# Patient Record
Sex: MALE | Race: WHITE | NOT HISPANIC OR LATINO | URBAN - METROPOLITAN AREA
[De-identification: names, ages, dates, MRNs, and addresses within clinical notes are randomized per-mention and may not be internally consistent; named-entity substitution may affect disease eponyms.]

---

## 2017-02-07 ENCOUNTER — EMERGENCY (EMERGENCY)
Facility: HOSPITAL | Age: 32
LOS: 1 days | Discharge: HOME | End: 2017-02-07
Attending: EMERGENCY MEDICINE
Payer: SELF-PAY

## 2017-02-07 VITALS
DIASTOLIC BLOOD PRESSURE: 78 MMHG | OXYGEN SATURATION: 96 % | HEIGHT: 69 IN | TEMPERATURE: 98 F | SYSTOLIC BLOOD PRESSURE: 126 MMHG | RESPIRATION RATE: 18 BRPM | WEIGHT: 154.98 LBS | HEART RATE: 98 BPM

## 2017-02-07 DIAGNOSIS — F14.10 COCAINE ABUSE, UNCOMPLICATED: ICD-10-CM

## 2017-02-07 DIAGNOSIS — F10.239 ALCOHOL DEPENDENCE WITH WITHDRAWAL, UNSPECIFIED: ICD-10-CM

## 2017-02-07 DIAGNOSIS — R25.1 TREMOR, UNSPECIFIED: ICD-10-CM

## 2017-02-07 LAB
ALBUMIN SERPL ELPH-MCNC: 4.4 G/DL — SIGNIFICANT CHANGE UP (ref 3.3–5.2)
ALP SERPL-CCNC: 113 U/L — SIGNIFICANT CHANGE UP (ref 40–120)
ALT FLD-CCNC: 124 U/L — HIGH
ANION GAP SERPL CALC-SCNC: 16 MMOL/L — SIGNIFICANT CHANGE UP (ref 5–17)
APAP SERPL-MCNC: <9.3 UG/ML — LOW (ref 10–26)
APPEARANCE UR: CLEAR — SIGNIFICANT CHANGE UP
AST SERPL-CCNC: 188 U/L — HIGH
BASOPHILS # BLD AUTO: 0 K/UL — SIGNIFICANT CHANGE UP (ref 0–0.2)
BASOPHILS NFR BLD AUTO: 0.9 % — SIGNIFICANT CHANGE UP (ref 0–2)
BILIRUB SERPL-MCNC: 0.8 MG/DL — SIGNIFICANT CHANGE UP (ref 0.4–2)
BILIRUB UR-MCNC: NEGATIVE — SIGNIFICANT CHANGE UP
BUN SERPL-MCNC: 7 MG/DL — LOW (ref 8–20)
CALCIUM SERPL-MCNC: 9.5 MG/DL — SIGNIFICANT CHANGE UP (ref 8.6–10.2)
CHLORIDE SERPL-SCNC: 92 MMOL/L — LOW (ref 98–107)
CK MB CFR SERPL CALC: 14.4 NG/ML — HIGH (ref 0–6.7)
CK SERPL-CCNC: 1281 U/L — HIGH (ref 30–200)
CO2 SERPL-SCNC: 25 MMOL/L — SIGNIFICANT CHANGE UP (ref 22–29)
COLOR SPEC: SIGNIFICANT CHANGE UP
CREAT SERPL-MCNC: 0.54 MG/DL — SIGNIFICANT CHANGE UP (ref 0.5–1.3)
DIFF PNL FLD: ABNORMAL
EOSINOPHIL # BLD AUTO: 0.1 K/UL — SIGNIFICANT CHANGE UP (ref 0–0.5)
EOSINOPHIL NFR BLD AUTO: 2.6 % — SIGNIFICANT CHANGE UP (ref 0–5)
GLUCOSE SERPL-MCNC: 130 MG/DL — HIGH (ref 70–115)
GLUCOSE UR QL: NEGATIVE MG/DL — SIGNIFICANT CHANGE UP
HCT VFR BLD CALC: 43 % — SIGNIFICANT CHANGE UP (ref 42–52)
HGB BLD-MCNC: 15.3 G/DL — SIGNIFICANT CHANGE UP (ref 14–18)
KETONES UR-MCNC: NEGATIVE — SIGNIFICANT CHANGE UP
LEUKOCYTE ESTERASE UR-ACNC: NEGATIVE — SIGNIFICANT CHANGE UP
LYMPHOCYTES # BLD AUTO: 1.1 K/UL — SIGNIFICANT CHANGE UP (ref 1–4.8)
LYMPHOCYTES # BLD AUTO: 21.1 % — SIGNIFICANT CHANGE UP (ref 20–55)
MAGNESIUM SERPL-MCNC: 1.4 MG/DL — LOW (ref 1.8–2.5)
MCHC RBC-ENTMCNC: 35.4 PG — HIGH (ref 27–31)
MCHC RBC-ENTMCNC: 35.6 G/DL — SIGNIFICANT CHANGE UP (ref 32–36)
MCV RBC AUTO: 99.5 FL — HIGH (ref 80–94)
MONOCYTES # BLD AUTO: 0.8 K/UL — SIGNIFICANT CHANGE UP (ref 0–0.8)
MONOCYTES NFR BLD AUTO: 16 % — HIGH (ref 3–10)
NEUTROPHILS # BLD AUTO: 3.2 K/UL — SIGNIFICANT CHANGE UP (ref 1.8–8)
NEUTROPHILS NFR BLD AUTO: 59.2 % — SIGNIFICANT CHANGE UP (ref 37–73)
NITRITE UR-MCNC: NEGATIVE — SIGNIFICANT CHANGE UP
PH UR: 8 — SIGNIFICANT CHANGE UP (ref 4.8–8)
PHOSPHATE SERPL-MCNC: 2.4 MG/DL — SIGNIFICANT CHANGE UP (ref 2.4–4.7)
PLATELET # BLD AUTO: 101 K/UL — LOW (ref 150–400)
POTASSIUM SERPL-MCNC: 3.7 MMOL/L — SIGNIFICANT CHANGE UP (ref 3.5–5.3)
POTASSIUM SERPL-SCNC: 3.7 MMOL/L — SIGNIFICANT CHANGE UP (ref 3.5–5.3)
PROT SERPL-MCNC: 7.7 G/DL — SIGNIFICANT CHANGE UP (ref 6.6–8.7)
PROT UR-MCNC: 15 MG/DL
RBC # BLD: 4.32 M/UL — LOW (ref 4.6–6.2)
RBC # FLD: 13.7 % — SIGNIFICANT CHANGE UP (ref 11–15.6)
RBC CASTS # UR COMP ASSIST: SIGNIFICANT CHANGE UP /HPF (ref 0–4)
SALICYLATES SERPL-MCNC: <2 MG/DL — LOW (ref 10–20)
SODIUM SERPL-SCNC: 133 MMOL/L — LOW (ref 135–145)
SP GR SPEC: 1.01 — SIGNIFICANT CHANGE UP (ref 1.01–1.02)
TROPONIN T SERPL-MCNC: <0.01 NG/ML — SIGNIFICANT CHANGE UP (ref 0–0.06)
TSH SERPL-MCNC: 2.82 UIU/ML — SIGNIFICANT CHANGE UP (ref 0.27–4.2)
UROBILINOGEN FLD QL: NEGATIVE MG/DL — SIGNIFICANT CHANGE UP
WBC # BLD: 5.32 K/UL — SIGNIFICANT CHANGE UP (ref 4.8–10.8)
WBC # FLD AUTO: 5.32 K/UL — SIGNIFICANT CHANGE UP (ref 4.8–10.8)

## 2017-02-07 PROCEDURE — 99285 EMERGENCY DEPT VISIT HI MDM: CPT

## 2017-02-07 PROCEDURE — 70450 CT HEAD/BRAIN W/O DYE: CPT | Mod: 26

## 2017-02-07 PROCEDURE — 71010: CPT | Mod: 26

## 2017-02-07 PROCEDURE — 93010 ELECTROCARDIOGRAM REPORT: CPT

## 2017-02-07 RX ORDER — SODIUM CHLORIDE 9 MG/ML
1000 INJECTION INTRAMUSCULAR; INTRAVENOUS; SUBCUTANEOUS ONCE
Qty: 0 | Refills: 0 | Status: COMPLETED | OUTPATIENT
Start: 2017-02-07 | End: 2017-02-07

## 2017-02-07 RX ORDER — MAGNESIUM SULFATE 500 MG/ML
2 VIAL (ML) INJECTION ONCE
Qty: 0 | Refills: 0 | Status: COMPLETED | OUTPATIENT
Start: 2017-02-07 | End: 2017-02-07

## 2017-02-07 RX ADMIN — SODIUM CHLORIDE 3000 MILLILITER(S): 9 INJECTION INTRAMUSCULAR; INTRAVENOUS; SUBCUTANEOUS at 17:57

## 2017-02-07 RX ADMIN — Medication 50 GRAM(S): at 19:11

## 2017-02-07 RX ADMIN — Medication 50 MILLIGRAM(S): at 22:24

## 2017-02-07 RX ADMIN — SODIUM CHLORIDE 3000 MILLILITER(S): 9 INJECTION INTRAMUSCULAR; INTRAVENOUS; SUBCUTANEOUS at 19:11

## 2017-02-07 NOTE — ED PROVIDER NOTE - OBJECTIVE STATEMENT
pt presents with tremors states " I took a bunch cocaine and ana last night" denies suicidal or homcidal ideation. no visual or auditory hallucinations. drink pint of day of vodka friend called for possible seizure . denies fever. denies HA or neck pain. no chest pain or sob. no abd pain. no n/v/d. no urinary f/u/d. no back pain. no motor or sensory deficits.  no rash. no other acute issues symptoms or concerns

## 2017-02-07 NOTE — ED ADULT NURSE NOTE - UNABLE TO OBTAIN
Unresponsive patient medicated with Valium 10mg IVP. received patient sleeping, arouses to tactile stimuli but unable to answer questions/

## 2017-02-07 NOTE — ED ADULT NURSE NOTE - OBJECTIVE STATEMENT
As per MD Jalloh patient had dilated pupils and appeared to be under the influence of unknown drugs and began to have a seizure. Airway remained patient, IV access obtained and medicated. No acute respiratory distress noted. Patient received in yellow gown and belongings secured during triage.

## 2017-02-07 NOTE — ED PROVIDER NOTE - PROGRESS NOTE DETAILS
pt is feeling much better and pt is asymptomatic and pt needed Librium for etoh withdrawl. pt with rhabdo. pt hydrated. no evidence of ARF. pt back to baseline mental status. pt will need SW as he has nowhere to go

## 2017-02-07 NOTE — ED PROVIDER NOTE - CARE PLAN
Principal Discharge DX:	Alcohol withdrawal  Secondary Diagnosis:	Tremor  Secondary Diagnosis:	Cocaine abuse

## 2017-02-07 NOTE — ED PROVIDER NOTE - MEDICAL DECISION MAKING DETAILS
will treat hypersympathetic state hydrate reevalv when clincally sober for need admission will hydrate fro rhaddo night attending to reassess will treat hypersympathetic state hydrate reevalv when clincally sober for need admission will hydrate fro rhaddo night attending to reassess  pt feeling much better no tremors he is competent return to ed for intractrable HA persitent vomiting or new onset motor or senory deficitys. he is aking to go home with friend he was provided librium at pharmacy pt agrees to plano f care seen by SW he is not suicidal or homicidal

## 2017-02-07 NOTE — ED ADULT TRIAGE NOTE - CHIEF COMPLAINT QUOTE
pt bib ems s/p seizure. pt states last drink was yesterday. pt usually drinks 3 pints of vodka. he also did 1 bag of cocaine. pt alert and oriented at this time.

## 2017-02-08 VITALS
SYSTOLIC BLOOD PRESSURE: 140 MMHG | HEART RATE: 82 BPM | RESPIRATION RATE: 16 BRPM | OXYGEN SATURATION: 99 % | DIASTOLIC BLOOD PRESSURE: 86 MMHG

## 2017-02-08 PROCEDURE — 84484 ASSAY OF TROPONIN QUANT: CPT

## 2017-02-08 PROCEDURE — 83735 ASSAY OF MAGNESIUM: CPT

## 2017-02-08 PROCEDURE — 70450 CT HEAD/BRAIN W/O DYE: CPT

## 2017-02-08 PROCEDURE — 82553 CREATINE MB FRACTION: CPT

## 2017-02-08 PROCEDURE — 80053 COMPREHEN METABOLIC PANEL: CPT

## 2017-02-08 PROCEDURE — 84100 ASSAY OF PHOSPHORUS: CPT

## 2017-02-08 PROCEDURE — 85027 COMPLETE CBC AUTOMATED: CPT

## 2017-02-08 PROCEDURE — 81001 URINALYSIS AUTO W/SCOPE: CPT

## 2017-02-08 PROCEDURE — 83690 ASSAY OF LIPASE: CPT

## 2017-02-08 PROCEDURE — 80307 DRUG TEST PRSMV CHEM ANLYZR: CPT

## 2017-02-08 PROCEDURE — 96375 TX/PRO/DX INJ NEW DRUG ADDON: CPT

## 2017-02-08 PROCEDURE — 71045 X-RAY EXAM CHEST 1 VIEW: CPT

## 2017-02-08 PROCEDURE — 84443 ASSAY THYROID STIM HORMONE: CPT

## 2017-02-08 PROCEDURE — 96374 THER/PROPH/DIAG INJ IV PUSH: CPT

## 2017-02-08 PROCEDURE — 82550 ASSAY OF CK (CPK): CPT

## 2017-02-08 PROCEDURE — 93005 ELECTROCARDIOGRAM TRACING: CPT

## 2017-02-08 PROCEDURE — 99284 EMERGENCY DEPT VISIT MOD MDM: CPT | Mod: 25

## 2017-02-08 RX ADMIN — Medication 2 MILLIGRAM(S): at 02:17

## 2017-02-08 RX ADMIN — Medication 50 MILLIGRAM(S): at 11:00

## 2017-02-08 NOTE — SBIRT NOTE. - NSSBIRTSERVICES_GEN_A_ED_FT
Provided SBIRT services: Full screen positive. Referral to Treatment Performed. Screening results were reviewed with the patient and patient was provided information about healthy guidelines and potential negative consequences associated with level of risk. Motivation and readiness to reduce or stop use was discussed and goals and activities to make changes were suggested/offered.  Referral for complete assessment and level of care determination at a certified treatment facility was completed by giving the patient information for treatment facilities that met their needs and encouraging them to call for an appointment. A call was not made to a facility because  Patient not interested at this time  Audit Score:38  DAST Score:5  Duration = 20 Minutes

## 2017-02-08 NOTE — ED BEHAVIORAL HEALTH NOTE - BEHAVIORAL HEALTH NOTE
SW asked to see pt in ED for d/c planning needs and h/o substance abuse. Chart reviewed. Met with pt @ bedside. pt AxOx4. Stated that he was brought in by ambulance overnight. Pt's friend, Todd, called as pt was under the influence and s/p seizure. Pt reported long history of substance abuse. Stated that he used cocaine, speed, and etoh last night. Pt has a hx of 2 felonies (one drug related and one for stolen car). Pt no longer on parole (was dismissed as of 2015). Pt is currently homeless. Stated that he resides in [place] at friend's house, but came out to [facility] to see his friend Todd and has been staying with him in [facility] (unsure of address). Pt is not interested in substance abuse treatment (inpatient or outpatient). Pt denied psychiatric hx. Denied suicidal or homocidal ideation. Pt has no PMD and no health insurance at this time. Pt currently unemployed as he states "it's hard finding a job with 2 felonies". Pt is single with no children. Pt will attempt to get in touch with his friend Todd for ride home today. Declined DSS emergency housing for shelter needs.

## 2017-02-08 NOTE — ED ADULT NURSE REASSESSMENT NOTE - NAUSEA/VOMITING
1=mild nausea with no vomiting
0=no nausea with no vomiting

## 2017-02-08 NOTE — ED ADULT NURSE REASSESSMENT NOTE - TREMOR
4=moderate with patient's arms extended
0=no tremor
0=no tremor
4=moderate with patient's arms extended

## 2017-02-08 NOTE — ED ADULT NURSE REASSESSMENT NOTE - NS ED NURSE REASSESS COMMENT FT1
Pt received at this time from off-going CJ Odonnell. Pt is expressing that he want's to leave and states he is feeling better. Pt with patent 20G RAC with 0.9%NS bolus infusing. 1:1 at bedside. Pt sp02 is currently 98% on RA. pt is a&ox4.
pt now c/o "severe headache".  noticeable tremors.  pt stating he lives in [facility] and has no way to get home.  dr batista made aware.  ativan ordered.
pt resting comfortably in cart awaiting ct results.  pt is a+ox4.  as per higinio montelongo to d/c constant observation.
pt sleeping and in nad.  1:1 at bedside.  resp even and unlabored.  vss.  will continue to monitor.
pt c/o feeling "weak".  Dr Hall made aware of weakness and pt's CIWA score.  librium ordered.
pt sleeping in stretcher with 1:1 at bedside. easily arousable to verbal stimuli.  sx improved after librium administration.  awaiting dispo.  will continue to monitor.
Pt received Alert and Oriented to person, place, and time, pt is easily aroused and has tremors with arms extended. HR is regular,lungs clear b/l abd soft with positve bowel sounds in all four quadrants will cont to monitor.

## 2020-07-15 ENCOUNTER — INPATIENT (INPATIENT)
Facility: HOSPITAL | Age: 35
LOS: 6 days | Discharge: PSYCHIATRIC OR SUBSTANCE USE FACILITY | End: 2020-07-22
Attending: HOSPITALIST | Admitting: HOSPITALIST
Payer: MEDICAID

## 2020-07-15 VITALS
SYSTOLIC BLOOD PRESSURE: 137 MMHG | RESPIRATION RATE: 16 BRPM | DIASTOLIC BLOOD PRESSURE: 94 MMHG | HEART RATE: 84 BPM | OXYGEN SATURATION: 99 % | TEMPERATURE: 98 F

## 2020-07-15 DIAGNOSIS — R07.89 OTHER CHEST PAIN: ICD-10-CM

## 2020-07-15 DIAGNOSIS — Z02.9 ENCOUNTER FOR ADMINISTRATIVE EXAMINATIONS, UNSPECIFIED: ICD-10-CM

## 2020-07-15 DIAGNOSIS — Z29.9 ENCOUNTER FOR PROPHYLACTIC MEASURES, UNSPECIFIED: ICD-10-CM

## 2020-07-15 DIAGNOSIS — R45.851 SUICIDAL IDEATIONS: ICD-10-CM

## 2020-07-15 DIAGNOSIS — F10.230 ALCOHOL DEPENDENCE WITH WITHDRAWAL, UNCOMPLICATED: ICD-10-CM

## 2020-07-15 DIAGNOSIS — S22.39XA FRACTURE OF ONE RIB, UNSPECIFIED SIDE, INITIAL ENCOUNTER FOR CLOSED FRACTURE: ICD-10-CM

## 2020-07-15 LAB
ALBUMIN SERPL ELPH-MCNC: 4.4 G/DL — SIGNIFICANT CHANGE UP (ref 3.3–5)
ALP SERPL-CCNC: 82 U/L — SIGNIFICANT CHANGE UP (ref 40–120)
ALT FLD-CCNC: 27 U/L — SIGNIFICANT CHANGE UP (ref 4–41)
AMPHET UR-MCNC: NEGATIVE — SIGNIFICANT CHANGE UP
ANION GAP SERPL CALC-SCNC: 15 MMO/L — HIGH (ref 7–14)
APAP SERPL-MCNC: < 15 UG/ML — LOW (ref 15–25)
APPEARANCE UR: SIGNIFICANT CHANGE UP
AST SERPL-CCNC: 35 U/L — SIGNIFICANT CHANGE UP (ref 4–40)
BACTERIA # UR AUTO: HIGH
BARBITURATES UR SCN-MCNC: NEGATIVE — SIGNIFICANT CHANGE UP
BENZODIAZ UR-MCNC: NEGATIVE — SIGNIFICANT CHANGE UP
BILIRUB SERPL-MCNC: 0.3 MG/DL — SIGNIFICANT CHANGE UP (ref 0.2–1.2)
BILIRUB UR-MCNC: NEGATIVE — SIGNIFICANT CHANGE UP
BLOOD UR QL VISUAL: NEGATIVE — SIGNIFICANT CHANGE UP
BUN SERPL-MCNC: 8 MG/DL — SIGNIFICANT CHANGE UP (ref 7–23)
CALCIUM SERPL-MCNC: 9 MG/DL — SIGNIFICANT CHANGE UP (ref 8.4–10.5)
CANNABINOIDS UR-MCNC: NEGATIVE — SIGNIFICANT CHANGE UP
CHLORIDE SERPL-SCNC: 101 MMOL/L — SIGNIFICANT CHANGE UP (ref 98–107)
CO2 SERPL-SCNC: 24 MMOL/L — SIGNIFICANT CHANGE UP (ref 22–31)
COCAINE METAB.OTHER UR-MCNC: POSITIVE — SIGNIFICANT CHANGE UP
COLOR SPEC: SIGNIFICANT CHANGE UP
CREAT SERPL-MCNC: 0.64 MG/DL — SIGNIFICANT CHANGE UP (ref 0.5–1.3)
ETHANOL BLD-MCNC: 60 MG/DL — HIGH
GLUCOSE SERPL-MCNC: 99 MG/DL — SIGNIFICANT CHANGE UP (ref 70–99)
GLUCOSE UR-MCNC: NEGATIVE — SIGNIFICANT CHANGE UP
HCT VFR BLD CALC: 47.6 % — SIGNIFICANT CHANGE UP (ref 39–50)
HGB BLD-MCNC: 16 G/DL — SIGNIFICANT CHANGE UP (ref 13–17)
HIV COMBO RESULT: SIGNIFICANT CHANGE UP
HIV1+2 AB SPEC QL: SIGNIFICANT CHANGE UP
HYALINE CASTS # UR AUTO: NEGATIVE — SIGNIFICANT CHANGE UP
KETONES UR-MCNC: NEGATIVE — SIGNIFICANT CHANGE UP
LEUKOCYTE ESTERASE UR-ACNC: NEGATIVE — SIGNIFICANT CHANGE UP
MCHC RBC-ENTMCNC: 33.3 PG — SIGNIFICANT CHANGE UP (ref 27–34)
MCHC RBC-ENTMCNC: 33.6 % — SIGNIFICANT CHANGE UP (ref 32–36)
MCV RBC AUTO: 99.2 FL — SIGNIFICANT CHANGE UP (ref 80–100)
METHADONE UR-MCNC: NEGATIVE — SIGNIFICANT CHANGE UP
NITRITE UR-MCNC: NEGATIVE — SIGNIFICANT CHANGE UP
NRBC # FLD: 0 K/UL — SIGNIFICANT CHANGE UP (ref 0–0)
OPIATES UR-MCNC: NEGATIVE — SIGNIFICANT CHANGE UP
OXYCODONE UR-MCNC: NEGATIVE — SIGNIFICANT CHANGE UP
PCP UR-MCNC: NEGATIVE — SIGNIFICANT CHANGE UP
PH UR: 8 — SIGNIFICANT CHANGE UP (ref 5–8)
PLATELET # BLD AUTO: 173 K/UL — SIGNIFICANT CHANGE UP (ref 150–400)
PMV BLD: 8.4 FL — SIGNIFICANT CHANGE UP (ref 7–13)
POTASSIUM SERPL-MCNC: 4.2 MMOL/L — SIGNIFICANT CHANGE UP (ref 3.5–5.3)
POTASSIUM SERPL-SCNC: 4.2 MMOL/L — SIGNIFICANT CHANGE UP (ref 3.5–5.3)
PROT SERPL-MCNC: 6.8 G/DL — SIGNIFICANT CHANGE UP (ref 6–8.3)
PROT UR-MCNC: 10 — SIGNIFICANT CHANGE UP
RBC # BLD: 4.8 M/UL — SIGNIFICANT CHANGE UP (ref 4.2–5.8)
RBC # FLD: 13.9 % — SIGNIFICANT CHANGE UP (ref 10.3–14.5)
RBC CASTS # UR COMP ASSIST: SIGNIFICANT CHANGE UP (ref 0–?)
SALICYLATES SERPL-MCNC: < 5 MG/DL — LOW (ref 15–30)
SODIUM SERPL-SCNC: 141 MMOL/L — SIGNIFICANT CHANGE UP (ref 135–145)
SP GR SPEC: 1.01 — SIGNIFICANT CHANGE UP (ref 1–1.04)
SQUAMOUS # UR AUTO: SIGNIFICANT CHANGE UP
TROPONIN T, HIGH SENSITIVITY: < 6 NG/L — SIGNIFICANT CHANGE UP (ref ?–14)
TSH SERPL-MCNC: 0.91 UIU/ML — SIGNIFICANT CHANGE UP (ref 0.27–4.2)
UROBILINOGEN FLD QL: NORMAL — SIGNIFICANT CHANGE UP
WBC # BLD: 5.3 K/UL — SIGNIFICANT CHANGE UP (ref 3.8–10.5)
WBC # FLD AUTO: 5.3 K/UL — SIGNIFICANT CHANGE UP (ref 3.8–10.5)
WBC UR QL: SIGNIFICANT CHANGE UP (ref 0–?)

## 2020-07-15 PROCEDURE — 71100 X-RAY EXAM RIBS UNI 2 VIEWS: CPT | Mod: 26,LT

## 2020-07-15 PROCEDURE — 71046 X-RAY EXAM CHEST 2 VIEWS: CPT | Mod: 26

## 2020-07-15 PROCEDURE — 99223 1ST HOSP IP/OBS HIGH 75: CPT | Mod: GC

## 2020-07-15 RX ORDER — FOLIC ACID 0.8 MG
1 TABLET ORAL DAILY
Refills: 0 | Status: DISCONTINUED | OUTPATIENT
Start: 2020-07-15 | End: 2020-07-22

## 2020-07-15 RX ORDER — ACETAMINOPHEN 500 MG
650 TABLET ORAL EVERY 6 HOURS
Refills: 0 | Status: DISCONTINUED | OUTPATIENT
Start: 2020-07-15 | End: 2020-07-22

## 2020-07-15 RX ORDER — ENOXAPARIN SODIUM 100 MG/ML
40 INJECTION SUBCUTANEOUS DAILY
Refills: 0 | Status: DISCONTINUED | OUTPATIENT
Start: 2020-07-15 | End: 2020-07-22

## 2020-07-15 RX ORDER — THIAMINE MONONITRATE (VIT B1) 100 MG
100 TABLET ORAL DAILY
Refills: 0 | Status: COMPLETED | OUTPATIENT
Start: 2020-07-15 | End: 2020-07-18

## 2020-07-15 RX ADMIN — Medication 25 MILLIGRAM(S): at 15:59

## 2020-07-15 RX ADMIN — Medication 50 MILLIGRAM(S): at 19:00

## 2020-07-15 NOTE — SBIRT NOTE ADULT - NSSBIRTALCPASSREFTXDET_GEN_A_CORE
Writer provided [facility]  - [place] with pt's room number in order to facilitate linkage to 120 day care coordination program: [facility] - p: [phone]. Writer requested to be made notified of pt's assigned coordinator.

## 2020-07-15 NOTE — SBIRT NOTE ADULT - NSSBIRTBRIEFINTDET_GEN_A_CORE
Pt receptive to engagement in consult and agreeable to reviewing healthy drinking guidelines. This writer provided pt with ARS/DAEHRS Addiction Services at [facility]: [address], [address], [address] [place] [address] p: [phone], [facility] Crisis walk in clinic p:[phone], [facility] Treatment/Care Services for Substance Use List. This writer additionally reviewed "Rethink Drinking" booklet from [employer] - [place] Department of Health and Human Services.  Pt endorses nicotine use (1 pack daily). Pt declined need for and [facility] For Tobacco Control Information: [address] [place] [address] , [place] [address] p: [phone] resource.

## 2020-07-15 NOTE — H&P ADULT - NSHPLABSRESULTS_GEN_ALL_CORE
16.0   5.30  )-----------( 173      ( 15 Jul 2020 12:30 )             47.6     Hemoglobin: 16.0 g/dL (07-15 @ 12:30)  Hemoglobin: 16.0 g/dL (07-15 @ 12:30)    CBC Full  -  ( 15 Jul 2020 12:30 )  WBC Count : 5.30 K/uL  RBC Count : 4.80 M/uL  Hemoglobin : 16.0 g/dL  Hematocrit : 47.6 %  Platelet Count - Automated : 173 K/uL  Mean Cell Volume : 99.2 fL  Mean Cell Hemoglobin : 33.3 pg  Mean Cell Hemoglobin Concentration : 33.6 %  Auto Neutrophil # : 3.49 K/uL  Auto Lymphocyte # : 1.06 K/uL  Auto Monocyte # : 0.63 K/uL  Auto Eosinophil # : 0.08 K/uL  Auto Basophil # : 0.05 K/uL  Auto Neutrophil % : 65.3 %  Auto Lymphocyte % : 19.9 %  Auto Monocyte % : 11.8 %  Auto Eosinophil % : 1.5 %  Auto Basophil % : 0.9 %    07-15    141  |  101  |  8   ----------------------------<  99  4.2   |  24  |  0.64    Ca    9.0      15 Jul 2020 12:30    TPro  6.8  /  Alb  4.4  /  TBili  0.3  /  DBili  x   /  AST  35  /  ALT  27  /  AlkPhos  82  15    Creatinine Trend: 0.64<--  LIVER FUNCTIONS - ( 15 Jul 2020 12:30 )  Alb: 4.4 g/dL / Pro: 6.8 g/dL / ALK PHOS: 82 u/L / ALT: 27 u/L / AST: 35 u/L / GGT: x           hs Troponin:  Troponin T, High Sensitivity (07.15.20 @ 18:49)    Troponin T, High Sensitivity: < 6: ---------------------***PLEASE NOTE***----------------------  Rapid changes upward or downward in high-sensitivity  troponin levels strongly suggest acute myocardial injury.  Hemolysis may falsely lower results. Renal impairment may  increase results.    Normal: <6 - 14 ng/L  Indeterminate: 15 - 51 ng/L  Elevated: >51 ng/L      Urinalysis Basic - ( 15 Jul 2020 14:00 )    Color: LIGHT ORANGE / Appearance: TURBID / S.014 / pH: 8.0  Gluc: NEGATIVE / Ketone: NEGATIVE  / Bili: NEGATIVE / Urobili: NORMAL   Blood: NEGATIVE / Protein: 10 / Nitrite: NEGATIVE   Leuk Esterase: NEGATIVE / RBC: 0-2 / WBC 0-2   Sq Epi: OCC / Non Sq Epi: x / Bacteria: MANY      EKG: In ED, HR 77, NSR with SA, , , no ST elev/dep, no TWI, QTc 452    IMAGING:    < from: Xray Chest 2 Views PA/Lat (07.15.20 @ 13:56) >    IMPRESSION:  Acute to subacute appearing anterior left 6th rib fracture near the costochondral junction, correlatefor point tenderness over this region. No additional suspected acute appearing rib fractures or other gross rib pathology.    Old distal right clavicular posttraumatic deformity with irregular discontinuous heterotopic ossifications in the right coracoclavicular space indicative of prior ligamentous injury.    Clear lungs. No pleural effusions or pneumothorax.    Cardiac and mediastinal silhouettes within normal limits.    Trachea midline.    Intact visualized osseous structures. In particular, no acute appearing displaced rib fractures or other gross rib pathology.    < end of copied text >

## 2020-07-15 NOTE — SBIRT NOTE ADULT - NSSBIRTDRGPASSREFTXDET_GEN_A_CORE
Writer provided [facility]  - [place] with pt's room number in order to facilitate linkage to 120 day care coordination program: [facility] - p: [phone]. Writer requested to be made notified of pt's assigned coordinator.

## 2020-07-15 NOTE — ED PROVIDER NOTE - OBJECTIVE STATEMENT
35yo M p/w suicidal ideation without plan. Patient further states he has been drinking 3 6-packs of beer daily for 3 months, and using cocaine daily. Denies any previous history of SI/HI or substance use/abuse. Has never attended detox previously. On ROS patient endorses focal, left sided chest wall pain x3 weeks with no trauma at onset. Denies fever/chills, chest pain, abdominal pain, shortness of breath, n/v/d, tremors, focal deficits, paresthesias, changes in vision, urinary or further symptoms.

## 2020-07-15 NOTE — H&P ADULT - HISTORY OF PRESENT ILLNESS
34 y.o. M w/ no PMHx, hx of alcohol and cocaine use disorder, p/w tremor, diaphoresis, and SI. Pt states he was released from jail on April 8th for second degree assault and has not been able to find a job. In addition, d/t to pandemic, pt fell back to drinking 1.5 months ago. Pt reports drinking one case of beer and one pint of vodka per day, last drink was one beer at 6AM today. Pt reports being hospitalized in ICU for drinking x1 in past, no intubations, black outs, but countless falls. 34 y.o. M w/ no PMHx, hx of alcohol and cocaine use disorder, p/w tremor, diaphoresis, and SI. Pt states he was released from skilled nursing on April 8th for second degree assault and has not been able to find a job. In addition, d/t to pandemic, pt reverted back to drinking 1.5 months ago. Pt reports drinking one case of beer and one pint of vodka per day, last drink was one beer at 6AM today. Pt reports being hospitalized in ICU for drinking x1 in past, no intubations, black outs, but countless falls. Pt also states he sniffs 1 g cocaine daily since 17 years of age, which helps keep him up. At this time, pt reports diaphoresis, nausea, and tremor. Pt also endorses SI in past 1-2 weeks, with recent attempt to jump off of a roof. Pt reports his friend stopped him. At this time, pt denying SI/I/P. Pt denying HA, fever, vomiting, CP, and SOB. Pt reports 30 lb weight loss in past 1.5 months. Per pt, visited [facility] crisis clinic before coming to ED looking for detox and was sent to ED since he appeared to be withdrawing.    In ED, CBC and CMP WNL, UA neg, U tox + for cocaine, BAL 60 @ 12:30 PM. CXR shows left acute to subacute 6th rib fx near costochondral junction. EKG NSR, placed on 1:1 and given librium 25 x1.

## 2020-07-15 NOTE — ED PROVIDER NOTE - PROGRESS NOTE DETAILS
Wilfrido CATALAN: Discussed with psych/SW. Patient went to [facility] crisis center looking for detox and was sent in because he appeared shaky and as if he was withdrawing. Last ETOH use was 6 AM. Patient reports history of withdrawal in the past. Psych states patient needs to not be intoxicated for psych evaluation. I spoke to Anya of SBIRT ([id number]) team and she states that patient needs psych eval if he is expressing suicide ideation. Plan to wait for labs (serum tox) and observe patient for symptoms of withdrawal. Praneeth CATALAN: Patient admitted to hospitalist Roger. exhibiting mild tremors, given librium in ED. VSS.

## 2020-07-15 NOTE — ED PROVIDER NOTE - ATTENDING CONTRIBUTION TO CARE
Patient is a 33 yo M with no chronic medical problems here for multiple complaints including suicidal ideation, request for detox and left sided chest pain. Patient states he was incarcerated, released in April and is on parole. He states he drinks about a case of beer a day and has been drinking for the past 1 month. Last drink was around 6 AM. He reports history of withdrawal symptoms. He states he has had left sided chest pain for 3 weeks. No trauma. + nausea. No vomiting. No fever, cough, shortness of breath. No abdominal pain. He went to [facility] crisis center this morning and was sent in for evaluation as he was thought to be withdrawing from ETOH. Patient also admits to cocaine use. Denies other drug use. Patient states he is depressed, tried to jump off of a roof last week but was stopped and never went to the hospital. Denies other attempts and ingestions.     VS noted  Gen. no acute distress, Non toxic   HEENT: EOMI, mmm  Lungs: CTAB/L no C/ W /R   CVS: RRR   Abd; Soft non tender, non distended   Ext: no edema  Skin: no rash  Neuro AAOx3 non focal clear speech  a/p: ETOH abuse/ not actively withdrawing here - plan for COVID19, ekg, CXR, labs, serum/ urine tox. Will discuss with psych/ SBIRT.   - Wilfrido CATALAN

## 2020-07-15 NOTE — ED PROVIDER NOTE - CARE PLAN
Principal Discharge DX:	Alcohol withdrawal syndrome without complication  Secondary Diagnosis:	Suicidal ideation

## 2020-07-15 NOTE — H&P ADULT - NSHPSOCIALHISTORY_GEN_ALL_CORE
Unknown living situation; pt smokes cigarettes 1 pack/day since age 13. Pt has an aunt he is close to who brought him into ED.  See HPI above

## 2020-07-15 NOTE — H&P ADULT - PROBLEM SELECTOR PLAN 1
-standing CIWA and symptom triggered CIWA  -librium taper  -psych to see pt  -SBIRT  -folate, thiamine, and MV  -VS q4 -standing CIWA and symptom triggered CIWA  -librium taper  -psych to see pt  -SBIRT  -folate, thiamine, and MV  -VS q4  -SW for rehab

## 2020-07-15 NOTE — ED PROVIDER NOTE - CARDIAC, MLM
Normal rate, regular rhythm.  Heart sounds S1, S2.  No murmurs, rubs or gallops. Focal, reproducible point tenderness to palpation to lower left rib cage in axillary line

## 2020-07-15 NOTE — H&P ADULT - NSHPPHYSICALEXAM_GEN_ALL_CORE
Vital Signs Last 24 Hrs  T(C): 36.8 (15 Jul 2020 18:57), Max: 36.8 (15 Jul 2020 15:21)  T(F): 98.3 (15 Jul 2020 18:57), Max: 98.3 (15 Jul 2020 18:57)  HR: 86 (15 Jul 2020 18:57) (61 - 91)  BP: 120/83 (15 Jul 2020 18:57) (120/83 - 137/94)  BP(mean): --  RR: 18 (15 Jul 2020 18:57) (12 - 18)  SpO2: 97% (15 Jul 2020 18:57) (97% - 100%)    GENERAL: Diaphoretic, tremulous   HEAD:  Atraumatic, Normocephalic  ENT: PERRL, conjunctiva and sclera clear, neck supple, no JVD, moist mucosa, posterior oropharynx clear  CHEST/LUNG: Clear to auscultation bilaterally; No wheeze, equal breath sounds bilaterally, respirations nonlabored  HEART: Regular rate and rhythm; No murmurs, rubs, or gallops  ABDOMEN: Soft, TTP in epigastric region; nondistended; Bowel sounds present, no organomegaly  EXTREMITIES:  No clubbing, cyanosis, or edema  PSYCH: Nl behavior, nl affect  NEUROLOGY: AAOx4, non-focal, moves all extremities spontaneously  SKIN: Normal color, No rashes or lesions

## 2020-07-15 NOTE — SBIRT NOTE ADULT - NSSBIRTALCPOSREINDET_GEN_A_CORE
Pt AAOx3, euthymic mood and congruent affect, speech clear and concise, behavior appropriate to this writer.   Full screen positive. Brief Intervention Performed. Passive Referral To Treatment Attempted. Screening results were reviewed with the patient and patient was provided information about healthy guidelines and potential negative consequences associated with level of risk. Motivation and readiness to reduce or stop use was discussed and goals and activities to make changes were suggested/offered. Options discussed for further evaluation and treatment, but referral to treatment was not completed because: Patient refused as he would like to f/u with his PO (George) prior to connecting to formal treatment. Pt notes released from [facility] 3 months ago. Pt endorses prior mandated SA treatment for previous charges. Pt declined need Chart Reviewed. Assessment completed on 7- by this author.   Pt AAOx3, euthymic mood and congruent affect, speech clear and concise, behavior appropriate to this writer.   Full screen positive. Brief Intervention Performed. Passive Referral To Treatment Attempted. Screening results were reviewed with the patient and patient was provided information about healthy guidelines and potential negative consequences associated with level of risk. Motivation and readiness to reduce or stop use was discussed and goals and activities to make changes were suggested/offered. Options discussed for further evaluation and treatment, but referral to treatment was not completed because: Patient refused as he would like to f/u with his PO (George) prior to connecting to formal treatment. Pt notes released from [facility] 3 months ago. Pt prior endorses engagement in mandated SA treatment for previous charges. Pt declined need for this writer to attempt connection to in rehab facility, however verbalized he may be interested in care coordination services.

## 2020-07-15 NOTE — ED PROVIDER NOTE - CLINICAL SUMMARY MEDICAL DECISION MAKING FREE TEXT BOX
Pt endorsing depression and thoughts about harming himself w/o plan. labs, urine, tox screen, xray of chest and rib cage ordered for medical clearance. placed under constant observation. Pt endorsing depression and thoughts about harming himself w/o plan. labs, urine, tox screen, xray of chest and rib cage ordered for medical clearance. placed under constant observation.   Update: Wilfrido CATALAN: patient exhibiting tremors, mild tongue fasciculations. plan to admit for ETOH withdrawal.

## 2020-07-15 NOTE — SBIRT NOTE ADULT - NSSBIRTUNABLESCROTHER_GEN_A_CORE
Please call SBIRT when pt is psychiatrically cleared. Please call SBIRT when pt is psychiatrically cleared - [phone] Not able to see pt due to current SI. Telephonic SBIRT available upon psychiatric clearance - [phone]

## 2020-07-15 NOTE — H&P ADULT - ATTENDING COMMENTS
Pt seen and examined 7/15 at 5 pm.  34 M, limited PMH, recent incarceration, released several months ago, states he has been drinking for approximately 1 month, approximately a case of beer daily + vodka, cocaine use.  Prior hospitalization for ETOH withdrawal with ICU stay , prior withdrawal from ETOH.  Here tremulous on exam, anxious appearing. Report of suicidal ideation.  CIWA monitoring with taper and symptom-triggered bnzs.  Pt high risk for DTs.   1 to 1 for suicidal ideation, psych eval.  Zofran for nausea, CXR read by me, clear lungs, Xray rib shows fracture. Pt denies pain on my exam but endorsed chest pain to ED.  EKG read by me, no STEMI.  Will check trops.

## 2020-07-15 NOTE — ED ADULT NURSE NOTE - OBJECTIVE STATEMENT
Pt received to trauma A. Pt comes to ED seeking detox treatment from alcohol and cocaine in addition to having thoughts of SI. Reports he drinks 18 beers a day, last drink was this morning and uses cocaine "once a day," last used cocaine last night. Pt states he does not have a plan for SI but has been feeling depressed and had suicidal thoughts for 1 month, did not seek psychiatric treatment. Tremors noted to hands and arms bilat, endorses nausea. Denies HI, hallucinations, headaches, palpitations, cough, vomiting, diarrhea, chest pain, dyspnea. Pt is A&Ox4, respirations are even & unlabored, lung sounds clear, heart sounds normal, abdomen is soft, non-distended. Pt's belongings collected and put in closet by room 21. PCA at bedside for constant observation. Pt denies recent sick contacts and having a  past medical history. Pt is calm & cooperative at this time.

## 2020-07-15 NOTE — H&P ADULT - NSHPREVIEWOFSYSTEMS_GEN_ALL_CORE
CONSTITUTIONAL: No weakness, fevers or chills  EYES/ENT: No visual changes;  No dysphagia  NECK: No pain or stiffness  RESPIRATORY: No cough, wheezing, hemoptysis; No shortness of breath  CARDIOVASCULAR: No chest pain or palpitations; No lower extremity edema  GASTROINTESTINAL: No abdominal pain; No vomiting, or hematemesis; No diarrhea or constipation. No melena or hematochezia.  GENITOURINARY: No dysuria, frequency or hematuria  NEUROLOGICAL: No numbness or weakness  HEMATOLOGY: No easy bleeding, no lymphadenopathy  SKIN: No itching, burning, rashes, or lesions   All other review of systems is negative unless indicated above.

## 2020-07-15 NOTE — ED ADULT TRIAGE NOTE - CHIEF COMPLAINT QUOTE
Pt requesting detox from alcohol and cocaine use. Pt states his last drink was at 6AM and last use of cocaine was last night at around 9pm. Pt states he drinks "liters" of alcohol a day of both beer and liquor. Slight tremors noted with patients arms extended. Pt endorses SI without plan at this time. Pt c/o left sided sharp chest pain for a few weeks. Denies SOB, lightheadedness, dizziness.

## 2020-07-16 DIAGNOSIS — F19.94 OTHER PSYCHOACTIVE SUBSTANCE USE, UNSPECIFIED WITH PSYCHOACTIVE SUBSTANCE-INDUCED MOOD DISORDER: ICD-10-CM

## 2020-07-16 DIAGNOSIS — F14.10 COCAINE ABUSE, UNCOMPLICATED: ICD-10-CM

## 2020-07-16 DIAGNOSIS — F10.10 ALCOHOL ABUSE, UNCOMPLICATED: ICD-10-CM

## 2020-07-16 LAB
ALBUMIN SERPL ELPH-MCNC: 3.4 G/DL — SIGNIFICANT CHANGE UP (ref 3.3–5)
ALP SERPL-CCNC: 83 U/L — SIGNIFICANT CHANGE UP (ref 40–120)
ALT FLD-CCNC: 22 U/L — SIGNIFICANT CHANGE UP (ref 4–41)
ANION GAP SERPL CALC-SCNC: 15 MMO/L — HIGH (ref 7–14)
ANION GAP SERPL CALC-SCNC: 9 MMO/L — SIGNIFICANT CHANGE UP (ref 7–14)
AST SERPL-CCNC: 37 U/L — SIGNIFICANT CHANGE UP (ref 4–40)
BILIRUB SERPL-MCNC: 0.6 MG/DL — SIGNIFICANT CHANGE UP (ref 0.2–1.2)
BUN SERPL-MCNC: 12 MG/DL — SIGNIFICANT CHANGE UP (ref 7–23)
BUN SERPL-MCNC: 14 MG/DL — SIGNIFICANT CHANGE UP (ref 7–23)
CALCIUM SERPL-MCNC: 9.2 MG/DL — SIGNIFICANT CHANGE UP (ref 8.4–10.5)
CALCIUM SERPL-MCNC: 9.2 MG/DL — SIGNIFICANT CHANGE UP (ref 8.4–10.5)
CHLORIDE SERPL-SCNC: 101 MMOL/L — SIGNIFICANT CHANGE UP (ref 98–107)
CHLORIDE SERPL-SCNC: 99 MMOL/L — SIGNIFICANT CHANGE UP (ref 98–107)
CO2 SERPL-SCNC: 16 MMOL/L — LOW (ref 22–31)
CO2 SERPL-SCNC: 27 MMOL/L — SIGNIFICANT CHANGE UP (ref 22–31)
CREAT SERPL-MCNC: 0.58 MG/DL — SIGNIFICANT CHANGE UP (ref 0.5–1.3)
CREAT SERPL-MCNC: 0.8 MG/DL — SIGNIFICANT CHANGE UP (ref 0.5–1.3)
GLUCOSE SERPL-MCNC: 104 MG/DL — HIGH (ref 70–99)
GLUCOSE SERPL-MCNC: 131 MG/DL — HIGH (ref 70–99)
HCT VFR BLD CALC: 46.3 % — SIGNIFICANT CHANGE UP (ref 39–50)
HGB BLD-MCNC: 16.1 G/DL — SIGNIFICANT CHANGE UP (ref 13–17)
MAGNESIUM SERPL-MCNC: 1.9 MG/DL — SIGNIFICANT CHANGE UP (ref 1.6–2.6)
MCHC RBC-ENTMCNC: 34.6 PG — HIGH (ref 27–34)
MCHC RBC-ENTMCNC: 34.8 % — SIGNIFICANT CHANGE UP (ref 32–36)
MCV RBC AUTO: 99.6 FL — SIGNIFICANT CHANGE UP (ref 80–100)
NRBC # FLD: 0 K/UL — SIGNIFICANT CHANGE UP (ref 0–0)
PHOSPHATE SERPL-MCNC: 3.6 MG/DL — SIGNIFICANT CHANGE UP (ref 2.5–4.5)
PLATELET # BLD AUTO: 143 K/UL — LOW (ref 150–400)
PMV BLD: 9.8 FL — SIGNIFICANT CHANGE UP (ref 7–13)
POTASSIUM SERPL-MCNC: 4.3 MMOL/L — SIGNIFICANT CHANGE UP (ref 3.5–5.3)
POTASSIUM SERPL-MCNC: 4.9 MMOL/L — SIGNIFICANT CHANGE UP (ref 3.5–5.3)
POTASSIUM SERPL-SCNC: 4.3 MMOL/L — SIGNIFICANT CHANGE UP (ref 3.5–5.3)
POTASSIUM SERPL-SCNC: 4.9 MMOL/L — SIGNIFICANT CHANGE UP (ref 3.5–5.3)
PROT SERPL-MCNC: 6.4 G/DL — SIGNIFICANT CHANGE UP (ref 6–8.3)
RBC # BLD: 4.65 M/UL — SIGNIFICANT CHANGE UP (ref 4.2–5.8)
RBC # FLD: 14.2 % — SIGNIFICANT CHANGE UP (ref 10.3–14.5)
SARS-COV-2 IGG SERPL QL IA: NEGATIVE — SIGNIFICANT CHANGE UP
SARS-COV-2 IGM SERPL IA-ACNC: <3.8 AU/ML — SIGNIFICANT CHANGE UP
SARS-COV-2 RNA SPEC QL NAA+PROBE: SIGNIFICANT CHANGE UP
SODIUM SERPL-SCNC: 132 MMOL/L — LOW (ref 135–145)
SODIUM SERPL-SCNC: 135 MMOL/L — SIGNIFICANT CHANGE UP (ref 135–145)
WBC # BLD: 5.24 K/UL — SIGNIFICANT CHANGE UP (ref 3.8–10.5)
WBC # FLD AUTO: 5.24 K/UL — SIGNIFICANT CHANGE UP (ref 3.8–10.5)

## 2020-07-16 PROCEDURE — 99232 SBSQ HOSP IP/OBS MODERATE 35: CPT | Mod: GC

## 2020-07-16 PROCEDURE — 99223 1ST HOSP IP/OBS HIGH 75: CPT

## 2020-07-16 RX ORDER — THIAMINE MONONITRATE (VIT B1) 100 MG
100 TABLET ORAL ONCE
Refills: 0 | Status: COMPLETED | OUTPATIENT
Start: 2020-07-16 | End: 2020-07-16

## 2020-07-16 RX ORDER — ONDANSETRON 8 MG/1
4 TABLET, FILM COATED ORAL ONCE
Refills: 0 | Status: COMPLETED | OUTPATIENT
Start: 2020-07-16 | End: 2020-07-16

## 2020-07-16 RX ADMIN — Medication 50 MILLIGRAM(S): at 22:28

## 2020-07-16 RX ADMIN — Medication 100 MILLIGRAM(S): at 01:04

## 2020-07-16 RX ADMIN — ENOXAPARIN SODIUM 40 MILLIGRAM(S): 100 INJECTION SUBCUTANEOUS at 12:45

## 2020-07-16 RX ADMIN — Medication 1 MILLIGRAM(S): at 12:45

## 2020-07-16 RX ADMIN — Medication 2 MILLIGRAM(S): at 09:36

## 2020-07-16 RX ADMIN — Medication 50 MILLIGRAM(S): at 12:45

## 2020-07-16 RX ADMIN — Medication 50 MILLIGRAM(S): at 01:05

## 2020-07-16 RX ADMIN — Medication 100 MILLIGRAM(S): at 12:45

## 2020-07-16 RX ADMIN — ONDANSETRON 4 MILLIGRAM(S): 8 TABLET, FILM COATED ORAL at 05:55

## 2020-07-16 RX ADMIN — Medication 50 MILLIGRAM(S): at 05:55

## 2020-07-16 RX ADMIN — Medication 1 TABLET(S): at 12:45

## 2020-07-16 NOTE — BEHAVIORAL HEALTH ASSESSMENT NOTE - SUMMARY
33 y/o M, single, non-domiciled, well supported by friends, unemployed, no pertinent past psychiatric history, no inpatient psych admission, current medical history of alcohol, cocaine abuse disorder. Received court mandated rehab in 2012 for cocaine arrest. Admitted for A/W after pt visited crisis clinic prior to ED looking for detox. Pt also has left 6th rib fx. No aggression on the floor, currently on 1:1, calm and cooperative. Psychiatry consulted for history of SI prior to ED arrival.     Pt endorsed two prior SI attempts associated with a depressed mood x2 months. Diagnosis of Substance induced depressive disorder, r/o MDD. Pt also with Alcohol withdrawal and alcohol abuse and cocaine abuse disorder.   Recommendations:   -c/w librium taper  -c/w CIWA protocol   -c/w 1:1  -may require [facility] admission once medically stable  -Will follow 35 y/o M, single, non-domiciled, well supported by friends, unemployed, no pertinent past psychiatric history, no inpatient psych admission, current medical history of alcohol, cocaine abuse disorder. Received court mandated rehab in 2012 for cocaine arrest. Admitted for A/W after pt visited crisis clinic prior to ED looking for detox. Pt also has left 6th rib fx. No aggression on the floor, currently on 1:1, calm and cooperative. Psychiatry consulted for history of SI prior to ED arrival. CIWAs have been 4-7, pt with prominent B/L hand tremors on exam. Patient currently endorsing SI with plan to kill himself outside of the hospital (although denies plan to harm himself while here), +depressive sx in the setting of ongoing EtOH and cocaine use, no psychotic symptoms.     Pt endorsed two prior SI attempts associated with a depressed mood x2 months. Diagnosis of Substance induced depressive disorder, r/o MDD. Pt also with Alcohol withdrawal and alcohol abuse and cocaine abuse disorder.     Recommendations:   -c/w librium taper as written   -c/w CIWA protocol   -c/w 1:1 for danger to self   -will likely require [facility] admission once medically stable  -Will follow

## 2020-07-16 NOTE — PROGRESS NOTE ADULT - PROBLEM SELECTOR PLAN 1
-standing CIWA and symptom triggered CIWA  -librium taper  -psych to see pt  -SBIRT  -folate, thiamine, and MV  -VS q4  -SW for rehab -standing CIWA and symptom triggered CIWA  -librium taper  -psych: likely [facility] admission once medically cleared  -SBIRT  -folate, thiamine, and MV  -VS q4  -SW for rehab?

## 2020-07-16 NOTE — BEHAVIORAL HEALTH ASSESSMENT NOTE - ACTIVATING EVENTS/STRESSORS
Pending incarceration or homelessness/Current or pending social isolation/Acute medical problem/Substance intoxication or withdrawal/Legal problems

## 2020-07-16 NOTE — BEHAVIORAL HEALTH ASSESSMENT NOTE - NSBHCHARTREVIEWIMAGING_PSY_A_CORE FT
< from: Xray Ribs 2 Views, Left (07.15.20 @ 13:57) >    IMPRESSION:  Acute to subacute appearing anterior left 6th rib fracture near the costochondral junction, correlatefor point tenderness over this region. No additional suspected acute appearing rib fractures or other gross rib pathology.    Old distal right clavicular posttraumatic deformity with irregular discontinuous heterotopic ossifications in the right coracoclavicular space indicative of prior ligamentous injury.    Clear lungs. No pleural effusions or pneumothorax.    Cardiac and mediastinal silhouettes within normal limits.    Trachea midline.    Intact visualized osseous structures. In particular, no acute appearing displaced rib fractures or other gross rib pathology.    < end of copied text >
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
## 2020-07-16 NOTE — BEHAVIORAL HEALTH ASSESSMENT NOTE - SUICIDE RISK FACTORS
Hopelessness or despair/Current mood episode/Alcohol/Substance abuse disorders/Insomnia Hopelessness or despair/Current mood episode/Alcohol/Substance abuse disorders/Insomnia/Impulsivity/Mood Disorder current/past

## 2020-07-16 NOTE — BEHAVIORAL HEALTH ASSESSMENT NOTE - NSBHCHARTREVIEWLAB_PSY_A_CORE FT
132<L>  |  101  |  12  ----------------------------<  104<H>  4.9   |  16<L>  |  0.58    Ca    9.2      2020 06:40  Phos  3.6       Mg     1.9         TPro  6.4  /  Alb  3.4  /  TBili  0.6  /  DBili  x   /  AST  37  /  ALT  22  /  AlkPhos  83                          16.1   5.24  )-----------( 143      ( 2020 06:40 )             46.3   LIVER FUNCTIONS - ( 2020 06:40 )  Alb: 3.4 g/dL / Pro: 6.4 g/dL / ALK PHOS: 83 u/L / ALT: 22 u/L / AST: 37 u/L / GGT: x         Urinalysis Basic - ( 15 Jul 2020 14:00 )    Color: LIGHT ORANGE / Appearance: TURBID / S.014 / pH: 8.0  Gluc: NEGATIVE / Ketone: NEGATIVE  / Bili: NEGATIVE / Urobili: NORMAL   Blood: NEGATIVE / Protein: 10 / Nitrite: NEGATIVE   Leuk Esterase: NEGATIVE / RBC: 0-2 / WBC 0-2   Sq Epi: OCC / Non Sq Epi: x / Bacteria: MANY    Toxicology Screen, Drugs of Abuse, Serum (07.15.20 @ 12:30)    Ethanol, Whole Blood: 60:   LEVELS OF IMPAIRMENT:  FLUSHING, SLOWING OF REFLEXES  IMPAIRED VISUAL ACUITY:             DEPRESSION OF CNS:                 > 100  FATALITIES REPORTED:               > 400  <10 mg/dL = Negative mg/dL

## 2020-07-16 NOTE — BEHAVIORAL HEALTH ASSESSMENT NOTE - NSBHCHARTREVIEWVS_PSY_A_CORE FT
Vital Signs Last 24 Hrs  T(C): 36.2 (16 Jul 2020 13:00), Max: 37.2 (16 Jul 2020 03:13)  T(F): 97.2 (16 Jul 2020 13:00), Max: 98.9 (16 Jul 2020 03:13)  HR: 60 (16 Jul 2020 13:00) (60 - 86)  BP: 111/79 (16 Jul 2020 13:00) (108/75 - 135/92)  RR: 16 (16 Jul 2020 13:00) (12 - 18)  SpO2: 98% RA (16 Jul 2020 13:00) (96% - 100%)

## 2020-07-16 NOTE — PROGRESS NOTE ADULT - SUBJECTIVE AND OBJECTIVE BOX
Internal Medicine Progress Note    Janell Rush MD PGY-1  Pager: [phone]; [facility] # [phone]; Team 7 Spectra [phone]  Please page [phone] after 7 pm or after 12 pm on weekends    Patient is a 34y old  Male who presents with a chief complaint of Alcohol withdrawal (2020 07:22)      SUBJECTIVE / OVERNIGHT EVENTS: O/n, pt nauseous, given zofran x1. At bedside, pt appears better, but still c/o of tremor and nausea. Pt attempting to eat some snacks. CIWA remains around 7.    MEDICATIONS  (STANDING):  chlordiazePOXIDE 50 milliGRAM(s) Oral every 8 hours  chlordiazePOXIDE   Oral   enoxaparin Injectable 40 milliGRAM(s) SubCutaneous daily  folic acid 1 milliGRAM(s) Oral daily  multivitamin 1 Tablet(s) Oral daily  thiamine 100 milliGRAM(s) Oral daily    MEDICATIONS  (PRN):  acetaminophen   Tablet .. 650 milliGRAM(s) Oral every 6 hours PRN Mild Pain (1 - 3), Moderate Pain (4 - 6)  LORazepam   Injectable 2 milliGRAM(s) IV Push every 2 hours PRN CIWA-Ar score increase by 2 points and a total score of 7 or less  LORazepam   Injectable 2 milliGRAM(s) IV Push every 1 hour PRN Symptom-triggered: each CIWA -Ar score 8 or GREATER    Vital Signs Last 24 Hrs  T(C): 36.2 (2020 21:48), Max: 37.2 (2020 03:13)  T(F): 97.2 (2020 21:48), Max: 98.9 (2020 03:13)  HR: 55 (2020 21:48) (52 - 75)  BP: 100/62 (2020 21:48) (100/62 - 130/76)  BP(mean): --  RR: 17 (2020 21:48) (12 - 18)  SpO2: 99% (2020 21:48) (96% - 100%)    CAPILLARY BLOOD GLUCOSE        I&O's Summary      PHYSICAL EXAM  GENERAL: tremulous, but lethargic  HEAD:  Atraumatic  EYES: EOMI, PERRLA, conjunctiva and sclera clear  NECK: Supple, No JVD  CHEST/LUNG: Clear to auscultation bilaterally; No w/r/r; some TTP of midsternum   HEART: Regular rate and rhythm; No murmurs, rubs, or gallops  ABDOMEN: Soft, TTP of epigastric region, Nondistended; Bowel sounds present  EXTREMITIES:  2+ Peripheral Pulses, No clubbing, cyanosis, or edema  NEURO/PSYCH: AAOx3, nonfocal  SKIN: No rashes or lesions      LABS:                        16.1   5.24  )-----------( 143      ( 2020 06:40 )             46.3     Hemoglobin: 16.1 g/dL ( @ 06:40)  Hemoglobin: 16.0 g/dL (07-15 @ 12:30)  Hemoglobin: 16.0 g/dL (07-15 @ 12:30)    CBC Full  -  ( 2020 06:40 )  WBC Count : 5.24 K/uL  RBC Count : 4.65 M/uL  Hemoglobin : 16.1 g/dL  Hematocrit : 46.3 %  Platelet Count - Automated : 143 K/uL  Mean Cell Volume : 99.6 fL  Mean Cell Hemoglobin : 34.6 pg  Mean Cell Hemoglobin Concentration : 34.8 %  Auto Neutrophil # : x  Auto Lymphocyte # : x  Auto Monocyte # : x  Auto Eosinophil # : x  Auto Basophil # : x  Auto Neutrophil % : x  Auto Lymphocyte % : x  Auto Monocyte % : x  Auto Eosinophil % : x  Auto Basophil % : x        135  |  99  |  14  ----------------------------<  131<H>  4.3   |  27  |  0.80    Ca    9.2      2020 15:45  Phos  3.6       Mg     1.9         TPro  6.4  /  Alb  3.4  /  TBili  0.6  /  DBili  x   /  AST  37  /  ALT  22  /  AlkPhos  83      Creatinine Trend: 0.80<--, 0.58<--, 0.64<--  LIVER FUNCTIONS - ( 2020 06:40 )  Alb: 3.4 g/dL / Pro: 6.4 g/dL / ALK PHOS: 83 u/L / ALT: 22 u/L / AST: 37 u/L / GGT: x               135  |  99  |  14  ----------------------------<  131<H>  4.3   |  27  |  0.80    Ca    9.2      2020 15:45  Phos  3.6     07-16  Mg     1.9     07-16    TPro  6.4  /  Alb  3.4  /  TBili  0.6  /  DBili  x   /  AST  37  /  ALT  22  /  AlkPhos  83  07-16      Urinalysis Basic - ( 15 Jul 2020 14:00 )    Color: LIGHT ORANGE / Appearance: TURBID / S.014 / pH: 8.0  Gluc: NEGATIVE / Ketone: NEGATIVE  / Bili: NEGATIVE / Urobili: NORMAL   Blood: NEGATIVE / Protein: 10 / Nitrite: NEGATIVE   Leuk Esterase: NEGATIVE / RBC: 0-2 / WBC 0-2   Sq Epi: OCC / Non Sq Epi: x / Bacteria: MANY Internal Medicine Progress Note    Janell Rush MD PGY-1  Pager: [phone]; [facility] # [id number]; Team 7 Spectra [phone]  Please page [phone] after 7 pm or after 12 pm on weekends    Patient is a 34y old  Male who presents with a chief complaint of Alcohol withdrawal (2020 07:22)      SUBJECTIVE / OVERNIGHT EVENTS: O/n, pt nauseous, given zofran x1. At bedside, pt appears better, but still c/o of tremor and nausea. Pt attempting to eat some snacks. CIWA remains around 7. Given Ativan 2 mg IVP this AM for symptom triggered CIWA.    MEDICATIONS  (STANDING):  chlordiazePOXIDE 50 milliGRAM(s) Oral every 8 hours  chlordiazePOXIDE   Oral   enoxaparin Injectable 40 milliGRAM(s) SubCutaneous daily  folic acid 1 milliGRAM(s) Oral daily  multivitamin 1 Tablet(s) Oral daily  thiamine 100 milliGRAM(s) Oral daily    MEDICATIONS  (PRN):  acetaminophen   Tablet .. 650 milliGRAM(s) Oral every 6 hours PRN Mild Pain (1 - 3), Moderate Pain (4 - 6)  LORazepam   Injectable 2 milliGRAM(s) IV Push every 2 hours PRN CIWA-Ar score increase by 2 points and a total score of 7 or less  LORazepam   Injectable 2 milliGRAM(s) IV Push every 1 hour PRN Symptom-triggered: each CIWA -Ar score 8 or GREATER    Vital Signs Last 24 Hrs  T(C): 36.2 (2020 21:48), Max: 37.2 (2020 03:13)  T(F): 97.2 (2020 21:48), Max: 98.9 (2020 03:13)  HR: 55 (2020 21:48) (52 - 75)  BP: 100/62 (2020 21:48) (100/62 - 130/76)  BP(mean): --  RR: 17 (2020 21:48) (12 - 18)  SpO2: 99% (2020 21:48) (96% - 100%)    CAPILLARY BLOOD GLUCOSE        I&O's Summary      PHYSICAL EXAM  GENERAL: tremulous, but lethargic  HEAD:  Atraumatic  EYES: EOMI, PERRLA, conjunctiva and sclera clear  NECK: Supple, No JVD  CHEST/LUNG: Clear to auscultation bilaterally; No w/r/r; some TTP of midsternum   HEART: Regular rate and rhythm; No murmurs, rubs, or gallops  ABDOMEN: Soft, TTP of epigastric region, Nondistended; Bowel sounds present  EXTREMITIES:  2+ Peripheral Pulses, No clubbing, cyanosis, or edema  NEURO/PSYCH: AAOx3, nonfocal  SKIN: No rashes or lesions      LABS:                        16.1   5.24  )-----------( 143      ( 2020 06:40 )             46.3     Hemoglobin: 16.1 g/dL ( @ 06:40)  Hemoglobin: 16.0 g/dL (07-15 @ 12:30)  Hemoglobin: 16.0 g/dL (07-15 @ 12:30)    CBC Full  -  ( 2020 06:40 )  WBC Count : 5.24 K/uL  RBC Count : 4.65 M/uL  Hemoglobin : 16.1 g/dL  Hematocrit : 46.3 %  Platelet Count - Automated : 143 K/uL  Mean Cell Volume : 99.6 fL  Mean Cell Hemoglobin : 34.6 pg  Mean Cell Hemoglobin Concentration : 34.8 %  Auto Neutrophil # : x  Auto Lymphocyte # : x  Auto Monocyte # : x  Auto Eosinophil # : x  Auto Basophil # : x  Auto Neutrophil % : x  Auto Lymphocyte % : x  Auto Monocyte % : x  Auto Eosinophil % : x  Auto Basophil % : x        135  |  99  |  14  ----------------------------<  131<H>  4.3   |  27  |  0.80    Ca    9.2      2020 15:45  Phos  3.6       Mg     1.9         TPro  6.4  /  Alb  3.4  /  TBili  0.6  /  DBili  x   /  AST  37  /  ALT  22  /  AlkPhos  83      Creatinine Trend: 0.80<--, 0.58<--, 0.64<--  LIVER FUNCTIONS - ( 2020 06:40 )  Alb: 3.4 g/dL / Pro: 6.4 g/dL / ALK PHOS: 83 u/L / ALT: 22 u/L / AST: 37 u/L / GGT: x               135  |  99  |  14  ----------------------------<  131<H>  4.3   |  27  |  0.80    Ca    9.2      2020 15:45  Phos  3.6     07-  Mg     1.9     -    TPro  6.4  /  Alb  3.4  /  TBili  0.6  /  DBili  x   /  AST  37  /  ALT  22  /  AlkPhos  83  07-      Urinalysis Basic - ( 15 Jul 2020 14:00 )    Color: LIGHT ORANGE / Appearance: TURBID / S.014 / pH: 8.0  Gluc: NEGATIVE / Ketone: NEGATIVE  / Bili: NEGATIVE / Urobili: NORMAL   Blood: NEGATIVE / Protein: 10 / Nitrite: NEGATIVE   Leuk Esterase: NEGATIVE / RBC: 0-2 / WBC 0-2   Sq Epi: OCC / Non Sq Epi: x / Bacteria: MANY

## 2020-07-17 LAB
ANION GAP SERPL CALC-SCNC: 10 MMO/L — SIGNIFICANT CHANGE UP (ref 7–14)
BUN SERPL-MCNC: 20 MG/DL — SIGNIFICANT CHANGE UP (ref 7–23)
CALCIUM SERPL-MCNC: 8.9 MG/DL — SIGNIFICANT CHANGE UP (ref 8.4–10.5)
CHLORIDE SERPL-SCNC: 100 MMOL/L — SIGNIFICANT CHANGE UP (ref 98–107)
CO2 SERPL-SCNC: 26 MMOL/L — SIGNIFICANT CHANGE UP (ref 22–31)
CREAT SERPL-MCNC: 0.69 MG/DL — SIGNIFICANT CHANGE UP (ref 0.5–1.3)
GLUCOSE SERPL-MCNC: 135 MG/DL — HIGH (ref 70–99)
MAGNESIUM SERPL-MCNC: 1.9 MG/DL — SIGNIFICANT CHANGE UP (ref 1.6–2.6)
PHOSPHATE SERPL-MCNC: 3.4 MG/DL — SIGNIFICANT CHANGE UP (ref 2.5–4.5)
POTASSIUM SERPL-MCNC: 4 MMOL/L — SIGNIFICANT CHANGE UP (ref 3.5–5.3)
POTASSIUM SERPL-SCNC: 4 MMOL/L — SIGNIFICANT CHANGE UP (ref 3.5–5.3)
SODIUM SERPL-SCNC: 136 MMOL/L — SIGNIFICANT CHANGE UP (ref 135–145)

## 2020-07-17 PROCEDURE — 99233 SBSQ HOSP IP/OBS HIGH 50: CPT | Mod: GC

## 2020-07-17 PROCEDURE — 99233 SBSQ HOSP IP/OBS HIGH 50: CPT

## 2020-07-17 RX ORDER — PANTOPRAZOLE SODIUM 20 MG/1
40 TABLET, DELAYED RELEASE ORAL
Refills: 0 | Status: DISCONTINUED | OUTPATIENT
Start: 2020-07-17 | End: 2020-07-22

## 2020-07-17 RX ORDER — SODIUM CHLORIDE 9 MG/ML
1000 INJECTION INTRAMUSCULAR; INTRAVENOUS; SUBCUTANEOUS
Refills: 0 | Status: DISCONTINUED | OUTPATIENT
Start: 2020-07-17 | End: 2020-07-19

## 2020-07-17 RX ORDER — ONDANSETRON 8 MG/1
4 TABLET, FILM COATED ORAL EVERY 6 HOURS
Refills: 0 | Status: DISCONTINUED | OUTPATIENT
Start: 2020-07-17 | End: 2020-07-22

## 2020-07-17 RX ADMIN — Medication 650 MILLIGRAM(S): at 20:40

## 2020-07-17 RX ADMIN — Medication 650 MILLIGRAM(S): at 21:30

## 2020-07-17 RX ADMIN — SODIUM CHLORIDE 100 MILLILITER(S): 9 INJECTION INTRAMUSCULAR; INTRAVENOUS; SUBCUTANEOUS at 13:09

## 2020-07-17 RX ADMIN — Medication 2 MILLIGRAM(S): at 22:03

## 2020-07-17 RX ADMIN — Medication 100 MILLIGRAM(S): at 13:06

## 2020-07-17 RX ADMIN — Medication 1 MILLIGRAM(S): at 13:07

## 2020-07-17 RX ADMIN — Medication 50 MILLIGRAM(S): at 05:47

## 2020-07-17 RX ADMIN — ENOXAPARIN SODIUM 40 MILLIGRAM(S): 100 INJECTION SUBCUTANEOUS at 13:06

## 2020-07-17 RX ADMIN — Medication 1 TABLET(S): at 13:06

## 2020-07-17 RX ADMIN — Medication 50 MILLIGRAM(S): at 13:06

## 2020-07-17 NOTE — PROGRESS NOTE BEHAVIORAL HEALTH - SUMMARY
35 y/o M, single, non-domiciled, well supported by friends, unemployed, no pertinent past psychiatric history, no inpatient psych admission, current medical history of alcohol, cocaine abuse disorder. Received court mandated rehab in 2012 for cocaine arrest. Admitted for A/W after pt visited crisis clinic prior to ED looking for detox. Pt also has left 6th rib fx. No aggression on the floor, currently on 1:1, calm and cooperative. Psychiatry consulted for history of SI prior to ED arrival. CIWAs have been 4-7, pt with prominent B/L hand tremors on exam. Patient currently endorsing SI with plan to kill himself outside of the hospital (although denies plan to harm himself while here), +depressive sx in the setting of ongoing EtOH and cocaine use, no psychotic symptoms.     Pt endorsed two prior SI attempts associated with a depressed mood x2 months. Diagnosis of Substance induced depressive disorder, r/o MDD. Pt also with Alcohol withdrawal and alcohol abuse and cocaine abuse disorder.     Recommendations:   -c/w librium taper as written   -c/w CIWA protocol   -c/w 1:1 for danger to self   -will likely require [facility] admission once medically stable  -Will follow 35 y/o M, single, non-domiciled, well supported by friends, unemployed, no pertinent past psychiatric history, no inpatient psych admission, current medical history of alcohol, cocaine abuse disorder. Received court mandated rehab in 2012 for cocaine arrest. Admitted for A/W after pt visited crisis clinic prior to ED looking for detox. Pt also has left 6th rib fx. No aggression on the floor, currently on 1:1, calm and cooperative. Psychiatry consulted for history of SI prior to ED arrival. CIWAs have been 4-7, pt with prominent B/L hand tremors on exam. Patient currently endorsing SI with plan to kill himself outside of the hospital (although denies plan to harm himself while here), +depressive sx in the setting of ongoing EtOH and cocaine use, no psychotic symptoms.     Pt endorsed two prior SI attempts associated with a depressed mood x2 months. Diagnosis of Substance induced depressive disorder, r/o MDD. Pt also with Alcohol withdrawal and alcohol abuse and cocaine abuse disorder.     Recommendations:   -c/w librium taper as written   -c/w CIWA protocol   -c/w CO 1:1 for danger to self   -will likely require [facility] admission once medically stable  -Will follow
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
## 2020-07-17 NOTE — PROGRESS NOTE ADULT - SUBJECTIVE AND OBJECTIVE BOX
Internal Medicine Progress Note    Janell Rush MD PGY-1  Pager: [phone]; [facility] # [id number]; Team 7 Spectra [phone]  Please page [phone] after 7 pm or after 12 pm on weekends    Patient is a 34y old  Male who presents with a chief complaint of Alcohol withdrawal (17 Jul 2020 06:30)      SUBJECTIVE / OVERNIGHT EVENTS: No acute events o/n. Pt's CIWA scores ranged between 5-6 o/n. At bedside, pt continues to c/o tremors, diaphoresis, nausea, abd pain, and poor PO intake. Denies SOB and CP.    MEDICATIONS  (STANDING):  chlordiazePOXIDE   Oral   enoxaparin Injectable 40 milliGRAM(s) SubCutaneous daily  folic acid 1 milliGRAM(s) Oral daily  multivitamin 1 Tablet(s) Oral daily  pantoprazole    Tablet 40 milliGRAM(s) Oral before breakfast  sodium chloride 0.9%. 1000 milliLiter(s) (100 mL/Hr) IV Continuous <Continuous>  thiamine 100 milliGRAM(s) Oral daily    MEDICATIONS  (PRN):  acetaminophen   Tablet .. 650 milliGRAM(s) Oral every 6 hours PRN Mild Pain (1 - 3), Moderate Pain (4 - 6)  LORazepam   Injectable 2 milliGRAM(s) IV Push every 2 hours PRN CIWA-Ar score increase by 2 points and a total score of 7 or less  LORazepam   Injectable 2 milliGRAM(s) IV Push every 1 hour PRN Symptom-triggered: each CIWA -Ar score 8 or GREATER  ondansetron    Tablet 4 milliGRAM(s) Oral every 6 hours PRN Nausea and/or Vomiting    Vital Signs Last 24 Hrs  T(C): 36.8 (17 Jul 2020 17:00), Max: 36.8 (17 Jul 2020 17:00)  T(F): 98.2 (17 Jul 2020 17:00), Max: 98.2 (17 Jul 2020 17:00)  HR: 72 (17 Jul 2020 17:00) (55 - 72)  BP: 118/82 (17 Jul 2020 17:00) (100/62 - 126/74)  BP(mean): --  RR: 18 (17 Jul 2020 17:00) (17 - 18)  SpO2: 98% (17 Jul 2020 17:00) (95% - 100%)    CAPILLARY BLOOD GLUCOSE        I&O's Summary      PHYSICAL EXAM  GENERAL: NAD  HEAD:  Atraumatic  EYES: EOMI, PERRLA, conjunctiva and sclera clear  NECK: Supple, No JVD  CHEST/LUNG: Clear to auscultation bilaterally; No wheeze  HEART: Regular rate and rhythm; No murmurs, rubs, or gallops  ABDOMEN: Soft, Nontender, Nondistended; Bowel sounds present  EXTREMITIES:  2+ Peripheral Pulses, No clubbing, cyanosis, or edema  NEURO/PSYCH: AAOx3, nonfocal  SKIN: No rashes or lesions      LABS:                        16.1   5.24  )-----------( 143      ( 16 Jul 2020 06:40 )             46.3     Hemoglobin: 16.1 g/dL (07-16 @ 06:40)  Hemoglobin: 16.0 g/dL (07-15 @ 12:30)  Hemoglobin: 16.0 g/dL (07-15 @ 12:30)    CBC Full  -  ( 16 Jul 2020 06:40 )  WBC Count : 5.24 K/uL  RBC Count : 4.65 M/uL  Hemoglobin : 16.1 g/dL  Hematocrit : 46.3 %  Platelet Count - Automated : 143 K/uL  Mean Cell Volume : 99.6 fL  Mean Cell Hemoglobin : 34.6 pg  Mean Cell Hemoglobin Concentration : 34.8 %  Auto Neutrophil # : x  Auto Lymphocyte # : x  Auto Monocyte # : x  Auto Eosinophil # : x  Auto Basophil # : x  Auto Neutrophil % : x  Auto Lymphocyte % : x  Auto Monocyte % : x  Auto Eosinophil % : x  Auto Basophil % : x    07-17    136  |  100  |  20  ----------------------------<  135<H>  4.0   |  26  |  0.69    Ca    8.9      17 Jul 2020 05:50  Phos  3.4     07-17  Mg     1.9     07-17    TPro  6.4  /  Alb  3.4  /  TBili  0.6  /  DBili  x   /  AST  37  /  ALT  22  /  AlkPhos  83  07-16    Creatinine Trend: 0.69<--, 0.80<--, 0.58<--, 0.64<--  LIVER FUNCTIONS - ( 16 Jul 2020 06:40 )  Alb: 3.4 g/dL / Pro: 6.4 g/dL / ALK PHOS: 83 u/L / ALT: 22 u/L / AST: 37 u/L / GGT: x Internal Medicine Progress Note    Jaenll Rush MD PGY-1  Pager: [phone]; [facility] # [id number]; Team 7 Spectra [phone]  Please page [phone] after 7 pm or after 12 pm on weekends    Patient is a 34y old  Male who presents with a chief complaint of Alcohol withdrawal (17 Jul 2020 06:30)      SUBJECTIVE / OVERNIGHT EVENTS: No acute events o/n. Pt's CIWA scores ranged between 5-6 o/n. At bedside, pt continues to c/o tremors, diaphoresis, nausea, abd pain, and poor PO intake. Denies SOB and CP.  Left VM for pt's Aunt.    MEDICATIONS  (STANDING):  chlordiazePOXIDE   Oral   enoxaparin Injectable 40 milliGRAM(s) SubCutaneous daily  folic acid 1 milliGRAM(s) Oral daily  multivitamin 1 Tablet(s) Oral daily  pantoprazole    Tablet 40 milliGRAM(s) Oral before breakfast  sodium chloride 0.9%. 1000 milliLiter(s) (100 mL/Hr) IV Continuous <Continuous>  thiamine 100 milliGRAM(s) Oral daily    MEDICATIONS  (PRN):  acetaminophen   Tablet .. 650 milliGRAM(s) Oral every 6 hours PRN Mild Pain (1 - 3), Moderate Pain (4 - 6)  LORazepam   Injectable 2 milliGRAM(s) IV Push every 2 hours PRN CIWA-Ar score increase by 2 points and a total score of 7 or less  LORazepam   Injectable 2 milliGRAM(s) IV Push every 1 hour PRN Symptom-triggered: each CIWA -Ar score 8 or GREATER  ondansetron    Tablet 4 milliGRAM(s) Oral every 6 hours PRN Nausea and/or Vomiting    Vital Signs Last 24 Hrs  T(C): 36.8 (17 Jul 2020 17:00), Max: 36.8 (17 Jul 2020 17:00)  T(F): 98.2 (17 Jul 2020 17:00), Max: 98.2 (17 Jul 2020 17:00)  HR: 72 (17 Jul 2020 17:00) (55 - 72)  BP: 118/82 (17 Jul 2020 17:00) (100/62 - 126/74)  BP(mean): --  RR: 18 (17 Jul 2020 17:00) (17 - 18)  SpO2: 98% (17 Jul 2020 17:00) (95% - 100%)    CAPILLARY BLOOD GLUCOSE        I&O's Summary      PHYSICAL EXAM  GENERAL: NAD  HEAD:  Atraumatic  EYES: EOMI, PERRLA, conjunctiva and sclera clear  NECK: Supple, No JVD  CHEST/LUNG: Clear to auscultation bilaterally; No wheeze  HEART: Regular rate and rhythm; No murmurs, rubs, or gallops  ABDOMEN: Soft, Nontender, Nondistended; Bowel sounds present  EXTREMITIES:  2+ Peripheral Pulses, No clubbing, cyanosis, or edema  NEURO/PSYCH: AAOx3, nonfocal  SKIN: No rashes or lesions      LABS:                        16.1   5.24  )-----------( 143      ( 16 Jul 2020 06:40 )             46.3     Hemoglobin: 16.1 g/dL (07-16 @ 06:40)  Hemoglobin: 16.0 g/dL (07-15 @ 12:30)  Hemoglobin: 16.0 g/dL (07-15 @ 12:30)    CBC Full  -  ( 16 Jul 2020 06:40 )  WBC Count : 5.24 K/uL  RBC Count : 4.65 M/uL  Hemoglobin : 16.1 g/dL  Hematocrit : 46.3 %  Platelet Count - Automated : 143 K/uL  Mean Cell Volume : 99.6 fL  Mean Cell Hemoglobin : 34.6 pg  Mean Cell Hemoglobin Concentration : 34.8 %  Auto Neutrophil # : x  Auto Lymphocyte # : x  Auto Monocyte # : x  Auto Eosinophil # : x  Auto Basophil # : x  Auto Neutrophil % : x  Auto Lymphocyte % : x  Auto Monocyte % : x  Auto Eosinophil % : x  Auto Basophil % : x    07-17    136  |  100  |  20  ----------------------------<  135<H>  4.0   |  26  |  0.69    Ca    8.9      17 Jul 2020 05:50  Phos  3.4     07-17  Mg     1.9     07-17    TPro  6.4  /  Alb  3.4  /  TBili  0.6  /  DBili  x   /  AST  37  /  ALT  22  /  AlkPhos  83  07-16    Creatinine Trend: 0.69<--, 0.80<--, 0.58<--, 0.64<--  LIVER FUNCTIONS - ( 16 Jul 2020 06:40 )  Alb: 3.4 g/dL / Pro: 6.4 g/dL / ALK PHOS: 83 u/L / ALT: 22 u/L / AST: 37 u/L / GGT: x

## 2020-07-17 NOTE — PROGRESS NOTE ADULT - PROBLEM SELECTOR PLAN 1
-standing CIWA and symptom triggered CIWA  -librium taper  -psych: likely [facility] admission once medically cleared  -SBIRT  -folate, thiamine, and MV  -VS q4  -SW for rehab?

## 2020-07-18 LAB
ANION GAP SERPL CALC-SCNC: 13 MMO/L — SIGNIFICANT CHANGE UP (ref 7–14)
BUN SERPL-MCNC: 17 MG/DL — SIGNIFICANT CHANGE UP (ref 7–23)
CALCIUM SERPL-MCNC: 8.9 MG/DL — SIGNIFICANT CHANGE UP (ref 8.4–10.5)
CHLORIDE SERPL-SCNC: 102 MMOL/L — SIGNIFICANT CHANGE UP (ref 98–107)
CO2 SERPL-SCNC: 22 MMOL/L — SIGNIFICANT CHANGE UP (ref 22–31)
CREAT SERPL-MCNC: 0.66 MG/DL — SIGNIFICANT CHANGE UP (ref 0.5–1.3)
GLUCOSE SERPL-MCNC: 86 MG/DL — SIGNIFICANT CHANGE UP (ref 70–99)
HCT VFR BLD CALC: 50.9 % — HIGH (ref 39–50)
HGB BLD-MCNC: 16.6 G/DL — SIGNIFICANT CHANGE UP (ref 13–17)
MAGNESIUM SERPL-MCNC: 1.9 MG/DL — SIGNIFICANT CHANGE UP (ref 1.6–2.6)
MCHC RBC-ENTMCNC: 32.6 % — SIGNIFICANT CHANGE UP (ref 32–36)
MCHC RBC-ENTMCNC: 33.5 PG — SIGNIFICANT CHANGE UP (ref 27–34)
MCV RBC AUTO: 102.6 FL — HIGH (ref 80–100)
NRBC # FLD: 0 K/UL — SIGNIFICANT CHANGE UP (ref 0–0)
PHOSPHATE SERPL-MCNC: 3.4 MG/DL — SIGNIFICANT CHANGE UP (ref 2.5–4.5)
PLATELET # BLD AUTO: 168 K/UL — SIGNIFICANT CHANGE UP (ref 150–400)
PMV BLD: 9.5 FL — SIGNIFICANT CHANGE UP (ref 7–13)
POTASSIUM SERPL-MCNC: 3.8 MMOL/L — SIGNIFICANT CHANGE UP (ref 3.5–5.3)
POTASSIUM SERPL-SCNC: 3.8 MMOL/L — SIGNIFICANT CHANGE UP (ref 3.5–5.3)
RBC # BLD: 4.96 M/UL — SIGNIFICANT CHANGE UP (ref 4.2–5.8)
RBC # FLD: 13.5 % — SIGNIFICANT CHANGE UP (ref 10.3–14.5)
SODIUM SERPL-SCNC: 137 MMOL/L — SIGNIFICANT CHANGE UP (ref 135–145)
WBC # BLD: 6.54 K/UL — SIGNIFICANT CHANGE UP (ref 3.8–10.5)
WBC # FLD AUTO: 6.54 K/UL — SIGNIFICANT CHANGE UP (ref 3.8–10.5)

## 2020-07-18 PROCEDURE — 99231 SBSQ HOSP IP/OBS SF/LOW 25: CPT

## 2020-07-18 PROCEDURE — 99232 SBSQ HOSP IP/OBS MODERATE 35: CPT | Mod: GC

## 2020-07-18 RX ORDER — LANOLIN ALCOHOL/MO/W.PET/CERES
3 CREAM (GRAM) TOPICAL ONCE
Refills: 0 | Status: COMPLETED | OUTPATIENT
Start: 2020-07-18 | End: 2020-07-18

## 2020-07-18 RX ADMIN — Medication 1 TABLET(S): at 12:55

## 2020-07-18 RX ADMIN — PANTOPRAZOLE SODIUM 40 MILLIGRAM(S): 20 TABLET, DELAYED RELEASE ORAL at 05:35

## 2020-07-18 RX ADMIN — ENOXAPARIN SODIUM 40 MILLIGRAM(S): 100 INJECTION SUBCUTANEOUS at 12:55

## 2020-07-18 RX ADMIN — Medication 1 MILLIGRAM(S): at 12:55

## 2020-07-18 RX ADMIN — Medication 50 MILLIGRAM(S): at 17:46

## 2020-07-18 RX ADMIN — ONDANSETRON 4 MILLIGRAM(S): 8 TABLET, FILM COATED ORAL at 12:55

## 2020-07-18 RX ADMIN — Medication 2 MILLIGRAM(S): at 12:56

## 2020-07-18 RX ADMIN — Medication 100 MILLIGRAM(S): at 12:55

## 2020-07-18 RX ADMIN — Medication 3 MILLIGRAM(S): at 22:22

## 2020-07-18 RX ADMIN — Medication 50 MILLIGRAM(S): at 05:35

## 2020-07-18 NOTE — PROGRESS NOTE BEHAVIORAL HEALTH - CASE SUMMARY
chart reviewed, Pt. was seen in person along with resident Dr. Mullins. Pt. was uncooperative with interview stating that " I want to sleep". Team attempted to talk to pt. twice, pt. is sleeping and don't want to be bothered.  Per staff pt. is calm and cooperative and is sleeping through out the day . No safety concerns . Continue current recommendations

## 2020-07-18 NOTE — PROGRESS NOTE BEHAVIORAL HEALTH - SUMMARY
33 y/o M, single, non-domiciled, well supported by friends, unemployed, no pertinent past psychiatric history, no inpatient psych admission, current medical history of alcohol, cocaine abuse disorder. Received court mandated rehab in 2012 for cocaine arrest. Admitted for A/W after pt visited crisis clinic prior to ED looking for detox. Pt also has left 6th rib fx. No aggression on the floor, currently on 1:1, calm and cooperative. Psychiatry consulted for history of SI prior to ED arrival. CIWAs have been 4-7, pt with prominent B/L hand tremors on exam. Patient currently endorsing SI with plan to kill himself outside of the hospital (although denies plan to harm himself while here), +depressive sx in the setting of ongoing EtOH and cocaine use, no psychotic symptoms.     Pt endorsed two prior SI attempts associated with a depressed mood x2 months. Diagnosis of Substance induced depressive disorder, r/o MDD. Pt also with Alcohol withdrawal and alcohol abuse and cocaine abuse disorder.     7/18: unable to engage patient in full interview due to patient's refusal to wake up for interview.    Recommendations:   -c/w librium taper as written   -c/w CIWA protocol   -c/w CO 1:1 for danger to self   -will likely require [facility] admission once medically stable  -Will follow

## 2020-07-18 NOTE — PROGRESS NOTE ADULT - SUBJECTIVE AND OBJECTIVE BOX
PROGRESS NOTE:   Authored by Dr. Pema Liu MD Pager [phone] [facility], [id number] [address]     Patient is a 34y old  Male who presents with a chief complaint of Alcohol withdrawal (17 Jul 2020 06:30)      SUBJECTIVE / OVERNIGHT EVENTS: No acute events overnight. CIWAs 4-7. At bedside, pt continues to c/o tremors, diaphoresis, nausea, abd pain, and poor PO intake. Denies SOB and CP.    ADDITIONAL REVIEW OF SYSTEMS: as above     MEDICATIONS  (STANDING):  chlordiazePOXIDE 50 milliGRAM(s) Oral every 12 hours  chlordiazePOXIDE 50 milliGRAM(s) Oral once  chlordiazePOXIDE   Oral   enoxaparin Injectable 40 milliGRAM(s) SubCutaneous daily  folic acid 1 milliGRAM(s) Oral daily  multivitamin 1 Tablet(s) Oral daily  pantoprazole    Tablet 40 milliGRAM(s) Oral before breakfast  sodium chloride 0.9%. 1000 milliLiter(s) (100 mL/Hr) IV Continuous <Continuous>  thiamine 100 milliGRAM(s) Oral daily    MEDICATIONS  (PRN):  acetaminophen   Tablet .. 650 milliGRAM(s) Oral every 6 hours PRN Mild Pain (1 - 3), Moderate Pain (4 - 6)  LORazepam   Injectable 2 milliGRAM(s) IV Push every 2 hours PRN CIWA-Ar score increase by 2 points and a total score of 7 or less  LORazepam   Injectable 2 milliGRAM(s) IV Push every 1 hour PRN Symptom-triggered: each CIWA -Ar score 8 or GREATER  ondansetron    Tablet 4 milliGRAM(s) Oral every 6 hours PRN Nausea and/or Vomiting      CAPILLARY BLOOD GLUCOSE        I&O's Summary      PHYSICAL EXAM:  Vital Signs Last 24 Hrs  T(C): 36.4 (18 Jul 2020 05:15), Max: 36.8 (17 Jul 2020 17:00)  T(F): 97.6 (18 Jul 2020 05:15), Max: 98.2 (17 Jul 2020 17:00)  HR: 47 (18 Jul 2020 05:15) (47 - 72)  BP: 94/62 (18 Jul 2020 05:15) (94/62 - 126/74)  BP(mean): --  RR: 18 (18 Jul 2020 05:15) (17 - 18)  SpO2: 99% (18 Jul 2020 05:15) (96% - 100%)    GENERAL: NAD  HEAD:  Atraumatic  EYES: EOMI, PERRLA, conjunctiva and sclera clear  NECK: Supple, No JVD  CHEST/LUNG: Clear to auscultation bilaterally; No wheeze  HEART: Regular rate and rhythm; No murmurs, rubs, or gallops  ABDOMEN: Soft, Nontender, Nondistended; Bowel sounds present  EXTREMITIES:  2+ Peripheral Pulses, No clubbing, cyanosis, or edema  NEURO/PSYCH: AAOx3, nonfocal  SKIN: No rashes or lesions    LABS:    07-17    136  |  100  |  20  ----------------------------<  135<H>  4.0   |  26  |  0.69    Ca    8.9      17 Jul 2020 05:50  Phos  3.4     07-17  Mg     1.9     07-17                  RADIOLOGY & ADDITIONAL TESTS:  Results Reviewed:   Imaging Personally Reviewed:  Electrocardiogram Personally Reviewed:    COORDINATION OF CARE:  Care Discussed with Consultants/Other Providers [Y/N]:  Prior or Outpatient Records Reviewed [Y/N]:

## 2020-07-18 NOTE — PROGRESS NOTE BEHAVIORAL HEALTH - NSBHFUPINTERVALHXFT_PSY_A_CORE
Per staff, patient has been sad at times discussing psychosocial stressors but no evidence of acute suicidality. He has been sleeping on and off most of the day and night. He woke up for breakfast and went back to sleep. He received Ativan 2 mg IVP x 1 PRN CIWA score increase from 4 to 7 last night. He was bradycardic to 47 and BP 94/62 early this morning without any symptoms.  Psychiatry team attempted to interview patient twice this morning. On both occasions he was asleep and would not engage in interview.

## 2020-07-18 NOTE — PROGRESS NOTE BEHAVIORAL HEALTH - NSBHCHARTREVIEWIMAGING_PSY_A_CORE FT
Xray Chest 2 Views PA/Lat (07.15.20 @ 13:56)   IMPRESSION:  Acute to subacute appearing anterior left 6th rib fracture near the costochondral junction, correlate for point tenderness over this region. No additional suspected acute appearing rib fractures or other gross rib pathology.  Old distal right clavicular posttraumatic deformity with irregular discontinuous heterotopic ossifications in the right coracoclavicular space indicative of prior ligamentous injury.  Clear lungs. No pleural effusions or pneumothorax.  Cardiac and mediastinal silhouettes within normal limits.  Trachea midline.  Intact visualized osseous structures. In particular, no acute appearing displaced rib fractures or other gross rib pathology.
Xray Chest 2 Views PA/Lat (07.15.20 @ 13:56)   IMPRESSION:  Acute to subacute appearing anterior left 6th rib fracture near the costochondral junction, correlate for point tenderness over this region. No additional suspected acute appearing rib fractures or other gross rib pathology.  Old distal right clavicular posttraumatic deformity with irregular discontinuous heterotopic ossifications in the right coracoclavicular space indicative of prior ligamentous injury.  Clear lungs. No pleural effusions or pneumothorax.  Cardiac and mediastinal silhouettes within normal limits.  Trachea midline.  Intact visualized osseous structures. In particular, no acute appearing displaced rib fractures or other gross rib pathology.

## 2020-07-19 PROCEDURE — 99232 SBSQ HOSP IP/OBS MODERATE 35: CPT | Mod: GC

## 2020-07-19 PROCEDURE — 99231 SBSQ HOSP IP/OBS SF/LOW 25: CPT

## 2020-07-19 RX ORDER — SERTRALINE 25 MG/1
25 TABLET, FILM COATED ORAL DAILY
Refills: 0 | Status: DISCONTINUED | OUTPATIENT
Start: 2020-07-19 | End: 2020-07-21

## 2020-07-19 RX ADMIN — SERTRALINE 25 MILLIGRAM(S): 25 TABLET, FILM COATED ORAL at 18:02

## 2020-07-19 RX ADMIN — Medication 50 MILLIGRAM(S): at 21:57

## 2020-07-19 RX ADMIN — Medication 1 MILLIGRAM(S): at 12:53

## 2020-07-19 RX ADMIN — PANTOPRAZOLE SODIUM 40 MILLIGRAM(S): 20 TABLET, DELAYED RELEASE ORAL at 07:47

## 2020-07-19 RX ADMIN — Medication 2 MILLIGRAM(S): at 16:33

## 2020-07-19 RX ADMIN — Medication 1 TABLET(S): at 12:53

## 2020-07-19 RX ADMIN — ENOXAPARIN SODIUM 40 MILLIGRAM(S): 100 INJECTION SUBCUTANEOUS at 12:53

## 2020-07-19 NOTE — PROGRESS NOTE BEHAVIORAL HEALTH - NSBHFUPINTERVALHXFT_PSY_A_CORE
Patient seen for f/u of suicidality and alcohol withdrawal.     On evaluation, he is irritable, stating he does not want to talk to anyone. States he is tired of doctors coming to see him often. Patient says that he still feels suicidal and he will "probably do something crazy" once I leave her but does not want to share what it is. Patient denies AH/VH but then states, last night, he thought he had left the hospital to get another bottle and then come back. States he thought that the bottle would be under his bed now but he cannot find it.     Patient believes he is inpatient psychiatric unit. He is not oriented to date.     Patient's CIWA scores have been 4 from last evening to this morning. He did not receive any Ativan PRNs overnight.    He continues to have slight bilateral hand tremor.

## 2020-07-19 NOTE — DISCHARGE NOTE PROVIDER - HOSPITAL COURSE
HPI: 34 y.o. M w/ no PMHx, hx of alcohol and cocaine use disorder, p/w tremor, diaphoresis, and SI. Pt states he was released from detention on April 8th for second degree assault and has not been able to find a job. In addition, d/t to pandemic, pt reverted back to drinking 1.5 months ago. Pt reports drinking one case of beer and one pint of vodka per day, last drink was one beer at 6AM today. Pt reports being hospitalized in ICU for drinking x1 in past, no intubations, black outs, but countless falls. Pt also states he sniffs 1 g cocaine daily since 17 years of age, which helps keep him up. At this time, pt reports diaphoresis, nausea, and tremor. Pt also endorses SI in past 1-2 weeks, with recent attempt to jump off of a roof. Pt reports his friend stopped him. At this time, pt denying SI/I/P. Pt denying HA, fever, vomiting, CP, and SOB. Pt reports 30 lb weight loss in past 1.5 months. Per pt, visited [facility] crisis clinic before coming to ED looking for detox and was sent to ED since he appeared to be withdrawing.        In ED, CBC and CMP WNL, UA neg, U tox + for cocaine, BAL 60 @ 12:30 PM. CXR shows left acute to subacute 6th rib fx near costochondral junction. EKG NSR, placed on 1:1 and given librium 25 x1.        Hospital Course:        On floors, pt was started on 4 day librium taper, folate, thiamine, and MV. Pt was put on standing and symptom triggered CIWA, CO, tylenol PRN for rib pain, zofran for nausea, PPI, and mIVF for poor PO intake. Trop neg. Psych evaluated pt and rec likely [facility] admission once medically cleared. HPI: 34 y.o. M w/ no PMHx, hx of alcohol and cocaine use disorder, p/w tremor, diaphoresis, and SI. Pt states he was released from shelter on April 8th for second degree assault and has not been able to find a job. In addition, d/t to pandemic, pt reverted back to drinking 1.5 months ago. Pt reports drinking one case of beer and one pint of vodka per day, last drink was one beer at 6AM today. Pt reports being hospitalized in ICU for drinking x1 in past, no intubations, black outs, but countless falls. Pt also states he sniffs 1 g cocaine daily since 17 years of age, which helps keep him up. At this time, pt reports diaphoresis, nausea, and tremor. Pt also endorses SI in past 1-2 weeks, with recent attempt to jump off of a roof. Pt reports his friend stopped him. At this time, pt denying SI/I/P. Pt denying HA, fever, vomiting, CP, and SOB. Pt reports 30 lb weight loss in past 1.5 months. Per pt, visited [facility] crisis clinic before coming to ED looking for detox and was sent to ED since he appeared to be withdrawing.        In ED, CBC and CMP WNL, UA neg, U tox + for cocaine, BAL 60 @ 12:30 PM. CXR shows left acute to subacute 6th rib fx near costochondral junction. EKG NSR, placed on 1:1 and given librium 25 x1.        Hospital Course:        On floors, pt was started on 4 day librium taper, folate, thiamine, and MV. Pt was put on standing and symptom triggered CIWA, CO, tylenol PRN for rib pain, zofran for nausea, PPI, and mIVF for poor PO intake. Trop neg. Psych evaluated pt and rec likely [facility] admission once medically cleared. Pt started on sertraline 25 qd on 7/19. HPI: 34 y.o. M w/ no PMHx, hx of alcohol and cocaine use disorder, p/w tremor, diaphoresis, and SI. Pt states he was released from correction on April 8th for second degree assault and has not been able to find a job. In addition, d/t to pandemic, pt reverted back to drinking 1.5 months ago. Pt reports drinking one case of beer and one pint of vodka per day, last drink was one beer at 6AM today. Pt reports being hospitalized in ICU for drinking x1 in past, no intubations, black outs, but countless falls. Pt also states he sniffs 1 g cocaine daily since 17 years of age, which helps keep him up. At this time, pt reports diaphoresis, nausea, and tremor. Pt also endorses SI in past 1-2 weeks, with recent attempt to jump off of a roof. Pt reports his friend stopped him. At this time, pt denying SI/I/P. Pt denying HA, fever, vomiting, CP, and SOB. Pt reports 30 lb weight loss in past 1.5 months. Per pt, visited [facility] crisis clinic before coming to ED looking for detox and was sent to ED since he appeared to be withdrawing.        In ED, CBC and CMP WNL, UA neg, U tox + for cocaine, BAL 60 @ 12:30 PM. CXR shows left acute to subacute 6th rib fx near costochondral junction. EKG NSR, placed on 1:1 and given librium 25 x1.        Hospital Course:        On floors, pt was started on 4 day librium taper, folate, thiamine, and MV. Pt was put on standing and symptom triggered CIWA, CO, tylenol PRN for rib pain, zofran for nausea, PPI, and mIVF for poor PO intake. Trop neg. Psych evaluated pt and rec likely [facility] admission once medically cleared. Pt started on sertraline 25 qd on 7/19. Pt medically cleared for discharge to [facility] for inpt psych admission. HPI: 34 y.o. M w/ no PMHx, hx of alcohol and cocaine use disorder, p/w tremor, diaphoresis, and SI. Pt states he was released from retirement on April 8th for second degree assault and has not been able to find a job. In addition, d/t to pandemic, pt reverted back to drinking 1.5 months ago. Pt reports drinking one case of beer and one pint of vodka per day, last drink was one beer at 6AM today. Pt reports being hospitalized in ICU for drinking x1 in past, no intubations, black outs, but countless falls. Pt also states he sniffs 1 g cocaine daily since 17 years of age, which helps keep him up. At this time, pt reports diaphoresis, nausea, and tremor. Pt also endorses SI in past 1-2 weeks, with recent attempt to jump off of a roof. Pt reports his friend stopped him. At this time, pt denying SI/I/P. Pt denying HA, fever, vomiting, CP, and SOB. Pt reports 30 lb weight loss in past 1.5 months. Per pt, visited [facility] crisis clinic before coming to ED looking for detox and was sent to ED since he appeared to be withdrawing.        In ED, CBC and CMP WNL, UA neg, U tox + for cocaine, BAL 60 @ 12:30 PM. CXR shows left acute to subacute 6th rib fx near costochondral junction. EKG NSR, placed on 1:1 and given librium 25 x1.        Hospital Course:        On floors, pt was started on 4 day librium taper, folate, thiamine, and MV. Pt was put on standing and symptom triggered CIWA, CO, tylenol PRN for rib pain, zofran for nausea, PPI, and mIVF for poor PO intake. Trop neg. Psych evaluated pt and rec likely [facility] admission once medically cleared. Pt started on sertraline 25 qd on 7/19, incr to 50 qd on 7/21. Pt medically cleared for discharge to [facility] for inpt psych admission. HPI: 34 y.o. M w/ no PMHx, hx of alcohol and cocaine use disorder, p/w tremor, diaphoresis, and SI. Pt states he was released from long term on April 8th for second degree assault and has not been able to find a job. In addition, d/t to pandemic, pt reverted back to drinking 1.5 months ago. Pt reports drinking one case of beer and one pint of vodka per day, last drink was one beer at 6AM today. Pt reports being hospitalized in ICU for drinking x1 in past, no intubations, black outs, but countless falls. Pt also states he sniffs 1 g cocaine daily since 17 years of age, which helps keep him up. At this time, pt reports diaphoresis, nausea, and tremor. Pt also endorses SI in past 1-2 weeks, with recent attempt to jump off of a roof. Pt reports his friend stopped him. At this time, pt denying SI/I/P. Pt denying HA, fever, vomiting, CP, and SOB. Pt reports 30 lb weight loss in past 1.5 months. Per pt, visited [facility] crisis clinic before coming to ED looking for detox and was sent to ED since he appeared to be withdrawing.        In ED, CBC and CMP WNL, UA neg, U tox + for cocaine, BAL 60 @ 12:30 PM. CXR shows left acute to subacute 6th rib fx near costochondral junction. EKG NSR, placed on 1:1 and given librium 25 x1.        Hospital Course:        On floors, pt was started on 4 day librium taper, folate, thiamine, and MV. Pt was put on standing and symptom triggered CIWA, CO, tylenol PRN for rib pain, zofran for nausea, PPI, and mIVF for poor PO intake. Trop neg. Psych evaluated pt and rec likely [facility] admission once medically cleared. Pt started on sertraline 25 qd on 7/19, incr to 50 qd on 7/21. Pt medically cleared for discharge to [facility] for inpt psych admission for suicidal thoughts and plan.

## 2020-07-19 NOTE — DISCHARGE NOTE PROVIDER - NSFOLLOWUPCLINICS_GEN_ALL_ED_FT
[facility] Psych Dept of Substance Abuse  Psychiatry Substance Abuse  [address] [place] [address]  Phone: [phone]  Fax:   Follow Up Time:     [facility] Psychiatry  Psychiatry  [address] [place] [address]  Phone: [phone]  Fax:   Follow Up Time:

## 2020-07-19 NOTE — PROGRESS NOTE ADULT - PROBLEM SELECTOR PLAN 1
-standing CIWA and symptom triggered CIWA  -librium taper  -psych: likely [facility] admission once medically cleared  -SBIRT  -folate, thiamine, and MV  -VS q4  -SW for rehab? -standing CIWA and symptom triggered CIWA  -s/p 4 day librium taper  -psych: likely [facility] admission once medically cleared  -SBIRT  -folate, thiamine, and MV  -VS q4  -SW for rehab?

## 2020-07-19 NOTE — DISCHARGE NOTE PROVIDER - NSDCMRMEDTOKEN_GEN_ALL_CORE_FT
folic acid 1 mg oral tablet: 1 tab(s) orally once a day  melatonin 3 mg oral tablet: 1 tab(s) orally once a day (at bedtime), As needed, Insomnia  Multiple Vitamins oral tablet: 1 tab(s) orally once a day  pantoprazole 40 mg oral delayed release tablet: 1 tab(s) orally once a day (before a meal)  sertraline 25 mg oral tablet: 1 tab(s) orally once a day folic acid 1 mg oral tablet: 1 tab(s) orally once a day  melatonin 3 mg oral tablet: 1 tab(s) orally once a day (at bedtime), As needed, Insomnia  Multiple Vitamins oral tablet: 1 tab(s) orally once a day  pantoprazole 40 mg oral delayed release tablet: 1 tab(s) orally once a day (before a meal)  sertraline 25 mg oral tablet: 1 tab(s) orally once a day  sertraline 50 mg oral tablet: 1 tab(s) orally once a day folic acid 1 mg oral tablet: 1 tab(s) orally once a day  melatonin 3 mg oral tablet: 1 tab(s) orally once a day (at bedtime), As needed, Insomnia  Multiple Vitamins oral tablet: 1 tab(s) orally once a day  pantoprazole 40 mg oral delayed release tablet: 1 tab(s) orally once a day (before a meal)  sertraline 50 mg oral tablet: 1 tab(s) orally once a day

## 2020-07-19 NOTE — PROGRESS NOTE BEHAVIORAL HEALTH - SUMMARY
33 y/o M, single, non-domiciled, well supported by friends, unemployed, no pertinent past psychiatric history, no inpatient psych admission, current medical history of alcohol, cocaine abuse disorder. Received court mandated rehab in 2012 for cocaine arrest. Admitted for A/W after pt visited crisis clinic prior to ED looking for detox. Pt also has left 6th rib fx. No aggression on the floor, currently on 1:1, calm and cooperative. Psychiatry consulted for history of SI prior to ED arrival. CIWAs have been 4-7, pt with prominent B/L hand tremors on exam. Patient currently endorsing SI with plan to kill himself outside of the hospital (although denies plan to harm himself while here), +depressive sx in the setting of ongoing EtOH and cocaine use, no psychotic symptoms.     Pt endorsed two prior SI attempts associated with a depressed mood x2 months. Diagnosis of Substance induced depressive disorder, r/o MDD. Pt also with Alcohol withdrawal and alcohol abuse and cocaine abuse disorder.     7/18: unable to engage patient in full interview due to patient's refusal to wake up for interview.    7/19: patient with continued SI with plan he is not fully disclosing and some delirium; no longer requiring Ativan PRNs; CIWAs have been 4    Recommendations:   -c/w librium taper as written   -c/w CIWA protocol   -c/w CO 1:1 for danger to self   -will likely require [facility] admission once medically stable  -Will follow 33 y/o M, single, non-domiciled, well supported by friends, unemployed, no pertinent past psychiatric history, no inpatient psych admission, current medical history of alcohol, cocaine abuse disorder. Received court mandated rehab in 2012 for cocaine arrest. Admitted for A/W after pt visited crisis clinic prior to ED looking for detox. Pt also has left 6th rib fx. No aggression on the floor, currently on 1:1, calm and cooperative. Psychiatry consulted for history of SI prior to ED arrival. CIWAs have been 4-7, pt with prominent B/L hand tremors on exam. Patient currently endorsing SI with plan to kill himself outside of the hospital (although denies plan to harm himself while here), +depressive sx in the setting of ongoing EtOH and cocaine use, no psychotic symptoms.     Pt endorsed two prior SI attempts associated with a depressed mood x2 months. Diagnosis of Substance induced depressive disorder, r/o MDD. Pt also with Alcohol withdrawal and alcohol abuse and cocaine abuse disorder.     7/18: unable to engage patient in full interview due to patient's refusal to wake up for interview.    7/19: patient with continued SI with plan he is not fully disclosing and some delirium; no longer requiring Ativan PRNs; CIWAs have been 5    Recommendations:   - stopped  librium  as per team  -c/w CIWA protocol   -c/w CO 1:1 for danger to self   -will likely require [facility] admission once medically stable  -Will follow

## 2020-07-19 NOTE — DISCHARGE NOTE PROVIDER - NSDCFUADDAPPT_GEN_ALL_CORE_FT
Please follow up with the [facility] dept of substance abuse within 1-2 weeks of discharge for management of your substance use.  Please follow up with the [facility] outpatient clinic within 1-2 weeks of discharge for further psychiatric management. Please follow up with the [facility] dept of substance abuse within 1-2 weeks of discharge for management of your substance use.  Please follow up with the [facility] outpatient clinic within 1-2 weeks of discharge for further psychiatric management.  Please call [phone] to establish care with a primary care physician after discharge from [facility].

## 2020-07-19 NOTE — PROGRESS NOTE ADULT - SUBJECTIVE AND OBJECTIVE BOX
Internal Medicine Progress Note    Janell Rush MD PGY-1  Pager: [phone]; [facility] # [id number]; Team 7 Spectra [phone]  Please page [phone] after 7 pm or after 12 pm on weekends    Patient is a 34y old  Male who presents with a chief complaint of Alcohol withdrawal (19 Jul 2020 06:31)      SUBJECTIVE / OVERNIGHT EVENTS: O/n, pt requesting sleep aid, given melatonin 3 mg. No PRNs given. At bedside in AM, pt more agitated, complaining of continued tremors, sweating, and nausea. Pt states "medication for sleep made him hallucinate that he went outside and bought a bottle of alcohol." Pt also reports speaking to his Aunt over the hospital phone, though there is no hospital phone by bedside. Pt reports trying to eat more yesterday, but still with nausea and poor appetite. Pt is asking for "something to help me sleep." Pt stating he is still suicidal and would rather carry out a 5 yr sentence than "do this." O/n, CIWA 4.      MEDICATIONS  (STANDING):  chlordiazePOXIDE 50 milliGRAM(s) Oral once  chlordiazePOXIDE   Oral   enoxaparin Injectable 40 milliGRAM(s) SubCutaneous daily  folic acid 1 milliGRAM(s) Oral daily  multivitamin 1 Tablet(s) Oral daily  pantoprazole    Tablet 40 milliGRAM(s) Oral before breakfast  sodium chloride 0.9%. 1000 milliLiter(s) (100 mL/Hr) IV Continuous <Continuous>    MEDICATIONS  (PRN):  acetaminophen   Tablet .. 650 milliGRAM(s) Oral every 6 hours PRN Mild Pain (1 - 3), Moderate Pain (4 - 6)  LORazepam   Injectable 2 milliGRAM(s) IV Push every 2 hours PRN CIWA-Ar score increase by 2 points and a total score of 7 or less  LORazepam   Injectable 2 milliGRAM(s) IV Push every 1 hour PRN Symptom-triggered: each CIWA -Ar score 8 or GREATER  ondansetron    Tablet 4 milliGRAM(s) Oral every 6 hours PRN Nausea and/or Vomiting    Vital Signs Last 24 Hrs  T(C): 36.4 (19 Jul 2020 09:07), Max: 36.8 (18 Jul 2020 13:00)  T(F): 97.5 (19 Jul 2020 09:07), Max: 98.3 (18 Jul 2020 13:00)  HR: 81 (19 Jul 2020 09:07) (60 - 81)  BP: 105/71 (19 Jul 2020 09:07) (102/57 - 108/68)  BP(mean): --  RR: 18 (19 Jul 2020 09:07) (16 - 19)  SpO2: 98% (19 Jul 2020 09:07) (98% - 100%)    CAPILLARY BLOOD GLUCOSE        I&O's Summary    18 Jul 2020 07:01  -  19 Jul 2020 07:00  --------------------------------------------------------  IN: 880 mL / OUT: 0 mL / NET: 880 mL        PHYSICAL EXAM  GENERAL: NAD  HEAD:  Atraumatic  EYES: EOMI, PERRLA, conjunctiva and sclera clear  NECK: Supple, No JVD  CHEST/LUNG: Clear to auscultation bilaterally; No wheeze  HEART: Regular rate and rhythm; No murmurs, rubs, or gallops  ABDOMEN: Soft, Nontender, Nondistended; Bowel sounds present  EXTREMITIES:  2+ Peripheral Pulses, No clubbing, cyanosis, or edema  NEURO/PSYCH: AAOx3, nonfocal  SKIN: No rashes or lesions      LABS:                        16.6   6.54  )-----------( 168      ( 18 Jul 2020 05:25 )             50.9     Hemoglobin: 16.6 g/dL (07-18 @ 05:25)  Hemoglobin: 16.1 g/dL (07-16 @ 06:40)  Hemoglobin: 16.0 g/dL (07-15 @ 12:30)  Hemoglobin: 16.0 g/dL (07-15 @ 12:30)    CBC Full  -  ( 18 Jul 2020 05:25 )  WBC Count : 6.54 K/uL  RBC Count : 4.96 M/uL  Hemoglobin : 16.6 g/dL  Hematocrit : 50.9 %  Platelet Count - Automated : 168 K/uL  Mean Cell Volume : 102.6 fL  Mean Cell Hemoglobin : 33.5 pg  Mean Cell Hemoglobin Concentration : 32.6 %  Auto Neutrophil # : x  Auto Lymphocyte # : x  Auto Monocyte # : x  Auto Eosinophil # : x  Auto Basophil # : x  Auto Neutrophil % : x  Auto Lymphocyte % : x  Auto Monocyte % : x  Auto Eosinophil % : x  Auto Basophil % : x    07-18    137  |  102  |  17  ----------------------------<  86  3.8   |  22  |  0.66    Ca    8.9      18 Jul 2020 05:25  Phos  3.4     07-18  Mg     1.9     07-18      Creatinine Trend: 0.66<--, 0.69<--, 0.80<--, 0.58<--, 0.64<--      EKG:   MICROBIOLOGY:    IMAGING:      Labs, imaging, EKG personally reviewed Internal Medicine Progress Note    Janell Rush MD PGY-1  Pager: [phone]; [facility] # [id number]; Team 7 Spectra [phone]  Please page [phone] after 7 pm or after 12 pm on weekends    Patient is a 34y old  Male who presents with a chief complaint of Alcohol withdrawal (19 Jul 2020 06:31)      SUBJECTIVE / OVERNIGHT EVENTS: O/n, pt requesting sleep aid, given melatonin 3 mg. No PRNs given. At bedside in AM, pt A&Ox3, to person, time, and reason for admission, but pt thought he was in psych [place]. Pt more agitated, complaining of continued tremors, sweating, and nausea. Pt states "medication for sleep made him hallucinate that he went outside and bought a bottle of alcohol." Pt also reports speaking to his Aunt over the hospital phone, though there is no hospital phone by bedside. Pt reports trying to eat more yesterday, but still with nausea and poor appetite. Pt is asking for "something to help me sleep." Pt stating he is still suicidal and would rather carry out a 5 yr sentence than "do this." O/n, CIWA 4.      MEDICATIONS  (STANDING):  chlordiazePOXIDE 50 milliGRAM(s) Oral once  chlordiazePOXIDE   Oral   enoxaparin Injectable 40 milliGRAM(s) SubCutaneous daily  folic acid 1 milliGRAM(s) Oral daily  multivitamin 1 Tablet(s) Oral daily  pantoprazole    Tablet 40 milliGRAM(s) Oral before breakfast  sodium chloride 0.9%. 1000 milliLiter(s) (100 mL/Hr) IV Continuous <Continuous>    MEDICATIONS  (PRN):  acetaminophen   Tablet .. 650 milliGRAM(s) Oral every 6 hours PRN Mild Pain (1 - 3), Moderate Pain (4 - 6)  LORazepam   Injectable 2 milliGRAM(s) IV Push every 2 hours PRN CIWA-Ar score increase by 2 points and a total score of 7 or less  LORazepam   Injectable 2 milliGRAM(s) IV Push every 1 hour PRN Symptom-triggered: each CIWA -Ar score 8 or GREATER  ondansetron    Tablet 4 milliGRAM(s) Oral every 6 hours PRN Nausea and/or Vomiting    Vital Signs Last 24 Hrs  T(C): 36.4 (19 Jul 2020 09:07), Max: 36.8 (18 Jul 2020 13:00)  T(F): 97.5 (19 Jul 2020 09:07), Max: 98.3 (18 Jul 2020 13:00)  HR: 81 (19 Jul 2020 09:07) (60 - 81)  BP: 105/71 (19 Jul 2020 09:07) (102/57 - 108/68)  BP(mean): --  RR: 18 (19 Jul 2020 09:07) (16 - 19)  SpO2: 98% (19 Jul 2020 09:07) (98% - 100%)    CAPILLARY BLOOD GLUCOSE        I&O's Summary    18 Jul 2020 07:01  -  19 Jul 2020 07:00  --------------------------------------------------------  IN: 880 mL / OUT: 0 mL / NET: 880 mL        PHYSICAL EXAM  GENERAL: NAD  HEAD:  Atraumatic  EYES: EOMI, PERRLA, conjunctiva and sclera clear  NECK: Supple, No JVD  CHEST/LUNG: Clear to auscultation bilaterally; No w/r/r  HEART: Regular rate and rhythm; No murmurs, rubs, or gallops  ABDOMEN: Soft, TTP in epigastric region, Nondistended; Bowel sounds present  EXTREMITIES:  2+ Peripheral Pulses, No clubbing, cyanosis, or edema  NEURO/PSYCH: AAOx3, nonfocal  SKIN: No rashes or lesions      LABS:                        16.6   6.54  )-----------( 168      ( 18 Jul 2020 05:25 )             50.9     Hemoglobin: 16.6 g/dL (07-18 @ 05:25)  Hemoglobin: 16.1 g/dL (07-16 @ 06:40)  Hemoglobin: 16.0 g/dL (07-15 @ 12:30)  Hemoglobin: 16.0 g/dL (07-15 @ 12:30)    CBC Full  -  ( 18 Jul 2020 05:25 )  WBC Count : 6.54 K/uL  RBC Count : 4.96 M/uL  Hemoglobin : 16.6 g/dL  Hematocrit : 50.9 %  Platelet Count - Automated : 168 K/uL  Mean Cell Volume : 102.6 fL  Mean Cell Hemoglobin : 33.5 pg  Mean Cell Hemoglobin Concentration : 32.6 %  Auto Neutrophil # : x  Auto Lymphocyte # : x  Auto Monocyte # : x  Auto Eosinophil # : x  Auto Basophil # : x  Auto Neutrophil % : x  Auto Lymphocyte % : x  Auto Monocyte % : x  Auto Eosinophil % : x  Auto Basophil % : x    07-18    137  |  102  |  17  ----------------------------<  86  3.8   |  22  |  0.66    Ca    8.9      18 Jul 2020 05:25  Phos  3.4     07-18  Mg     1.9     07-18      Creatinine Trend: 0.66<--, 0.69<--, 0.80<--, 0.58<--, 0.64<--

## 2020-07-19 NOTE — PROGRESS NOTE ADULT - ASSESSMENT
34 y.o. M w/ hx of alcohol and cocaine use disorder, p/w SI, admitted for alcohol w/d. 34 y.o. M w/ hx of alcohol and cocaine use disorder, p/w SI, admitted for alcohol w/d, s/p 4 day librium taper.

## 2020-07-19 NOTE — PROGRESS NOTE BEHAVIORAL HEALTH - CASE SUMMARY
33 y/o M, single, non-domiciled, well supported by friends, unemployed, no pertinent past psychiatric history, no inpatient psych admission, current medical history of alcohol, cocaine abuse disorder. chart reviewed, pt. was seen in person along with resident Dr. Torres. Pt. was uncooperative with interview stating that " leave me alone". Pt. is sleeping and don't want to be bothered. Pt. complains that every  hour someone is bothering him and he can't sleep .He is vague when asked about SI. Continue current recommendations.

## 2020-07-19 NOTE — PROGRESS NOTE ADULT - PROBLEM SELECTOR PLAN 6
Transitions of Care Status:  1.  Name of PCP:  2.  PCP Contacted on Admission: [ ] Y    [ ] N    3.  PCP contacted at Discharge: [ ] Y    [ ] N    [ ] N/A  4.  Post-Discharge Appointment Date and Location:  5.  Summary of Handoff given to PCP: Transitions of Care Status:  1.  Name of PCP: none  2.  PCP Contacted on Admission: [ ] Y    [ ] N    3.  PCP contacted at Discharge: [ ] Y    [ ] N    [ ] N/A  4.  Post-Discharge Appointment Date and Location:  5.  Summary of Handoff given to PCP:

## 2020-07-19 NOTE — DISCHARGE NOTE PROVIDER - NSDCCPCAREPLAN_GEN_ALL_CORE_FT
PRINCIPAL DISCHARGE DIAGNOSIS  Diagnosis: Alcohol withdrawal syndrome without complication  Assessment and Plan of Treatment: You were admitted for alcohol withdrawal. You were treated with a medication called librium to help with the symptoms of alcohol withdrawal and to prevent fatal complications of alcohol withdrawal. You were also given medication for anxiety, nausea, pain, and fluids since you were not eating well.      SECONDARY DISCHARGE DIAGNOSES  Diagnosis: Suicidal ideation  Assessment and Plan of Treatment: You were assessed by the psychiatry team at [facility] for your suicidality. You were deemed to be someone who could benefit from an inpatient psychiatric admission. PRINCIPAL DISCHARGE DIAGNOSIS  Diagnosis: Alcohol withdrawal syndrome without complication  Assessment and Plan of Treatment: You were admitted for alcohol withdrawal. You were treated with a medication called librium to help with the symptoms of alcohol withdrawal and to prevent fatal complications of alcohol withdrawal. You were also given medication for anxiety, nausea, pain, and fluids since you were not eating well.      SECONDARY DISCHARGE DIAGNOSES  Diagnosis: Suicidal ideation  Assessment and Plan of Treatment: You were assessed by the psychiatry team at [facility] for your suicidality. You were deemed to be someone who could benefit from an inpatient psychiatric admission. PRINCIPAL DISCHARGE DIAGNOSIS  Diagnosis: Alcohol withdrawal syndrome without complication  Assessment and Plan of Treatment: You were admitted for alcohol withdrawal. You were treated with a medication called librium to help with the symptoms of alcohol withdrawal and to prevent fatal complications of alcohol withdrawal. You were also given medication for anxiety, nausea, pain, and fluids since you were not eating well. You were transferred to [facility] for further management of your suicidality and substance use.      SECONDARY DISCHARGE DIAGNOSES  Diagnosis: Suicidal ideation  Assessment and Plan of Treatment: You were assessed by the psychiatry team at [facility] for your suicidality. You were deemed to be someone who could benefit from an inpatient psychiatric admission. Please continue to engage in psychiatric treatment with the mental health providers set up at [facility].

## 2020-07-20 PROCEDURE — 99233 SBSQ HOSP IP/OBS HIGH 50: CPT

## 2020-07-20 PROCEDURE — 99232 SBSQ HOSP IP/OBS MODERATE 35: CPT | Mod: GC

## 2020-07-20 RX ORDER — CALCIUM CARBONATE 500(1250)
1 TABLET ORAL THREE TIMES A DAY
Refills: 0 | Status: DISCONTINUED | OUTPATIENT
Start: 2020-07-20 | End: 2020-07-22

## 2020-07-20 RX ORDER — LANOLIN ALCOHOL/MO/W.PET/CERES
3 CREAM (GRAM) TOPICAL AT BEDTIME
Refills: 0 | Status: DISCONTINUED | OUTPATIENT
Start: 2020-07-20 | End: 2020-07-22

## 2020-07-20 RX ORDER — SIMETHICONE 80 MG/1
80 TABLET, CHEWABLE ORAL THREE TIMES A DAY
Refills: 0 | Status: DISCONTINUED | OUTPATIENT
Start: 2020-07-20 | End: 2020-07-20

## 2020-07-20 RX ADMIN — Medication 1 TABLET(S): at 12:59

## 2020-07-20 RX ADMIN — PANTOPRAZOLE SODIUM 40 MILLIGRAM(S): 20 TABLET, DELAYED RELEASE ORAL at 07:47

## 2020-07-20 RX ADMIN — Medication 1 MILLIGRAM(S): at 12:59

## 2020-07-20 RX ADMIN — ENOXAPARIN SODIUM 40 MILLIGRAM(S): 100 INJECTION SUBCUTANEOUS at 13:00

## 2020-07-20 RX ADMIN — Medication 3 MILLIGRAM(S): at 21:00

## 2020-07-20 RX ADMIN — SERTRALINE 25 MILLIGRAM(S): 25 TABLET, FILM COATED ORAL at 12:59

## 2020-07-20 RX ADMIN — Medication 1 TABLET(S): at 18:28

## 2020-07-20 NOTE — PROGRESS NOTE ADULT - ASSESSMENT
34 y.o. M w/ hx of alcohol and cocaine use disorder, p/w SI, admitted for alcohol w/d, s/p 4 day librium taper. 34 y.o. M w/ hx of alcohol and cocaine use disorder, p/w SI, admitted for alcohol w/d, s/p 4 day librium taper, pending dispo.

## 2020-07-20 NOTE — PROGRESS NOTE ADULT - SUBJECTIVE AND OBJECTIVE BOX
Internal Medicine Progress Note    Janell Rush MD PGY-1  Pager: [phone]; [facility] # [phone]; Team 7 Spectra [phone]  Please page [phone] after 7 pm or after 12 pm on weekends    Patient is a 34y old  Male who presents with a chief complaint of Alcohol withdrawal (20 Jul 2020 06:33)      SUBJECTIVE / OVERNIGHT EVENTS: O/n, CIWAs 4, pt showered; otherwise, no acute events. No PRNs given. At bedside, pt is sleeping, easily arousable to voice. Pt states he is overall improved from when he first came in, but still feeling some shakes, nausea, and unsteadiness/weakness walking. Pt attempting to PO.  Per psych, pt no longer endorsing SI and rec inpt vs outpt substance rehab.  Later, when SW spoke to pt, pt stated he would harm himself upon discharge.      MEDICATIONS  (STANDING):  enoxaparin Injectable 40 milliGRAM(s) SubCutaneous daily  folic acid 1 milliGRAM(s) Oral daily  multivitamin 1 Tablet(s) Oral daily  pantoprazole    Tablet 40 milliGRAM(s) Oral before breakfast  sertraline 25 milliGRAM(s) Oral daily    MEDICATIONS  (PRN):  acetaminophen   Tablet .. 650 milliGRAM(s) Oral every 6 hours PRN Mild Pain (1 - 3), Moderate Pain (4 - 6)  calcium carbonate    500 mG (Tums) Chewable 1 Tablet(s) Chew three times a day PRN Heartburn  LORazepam     Tablet 2 milliGRAM(s) Oral every 6 hours PRN Anxiety/tremors  LORazepam   Injectable 2 milliGRAM(s) IV Push every 2 hours PRN CIWA-Ar score increase by 2 points and a total score of 7 or less  LORazepam   Injectable 2 milliGRAM(s) IV Push every 1 hour PRN Symptom-triggered: each CIWA -Ar score 8 or GREATER  melatonin 3 milliGRAM(s) Oral at bedtime PRN Insomnia  ondansetron    Tablet 4 milliGRAM(s) Oral every 6 hours PRN Nausea and/or Vomiting    Vital Signs Last 24 Hrs  T(C): 36.4 (20 Jul 2020 20:57), Max: 36.4 (20 Jul 2020 20:57)  T(F): 97.5 (20 Jul 2020 20:57), Max: 97.5 (20 Jul 2020 20:57)  HR: 79 (20 Jul 2020 20:57) (62 - 99)  BP: 106/63 (20 Jul 2020 20:57) (104/66 - 107/74)  BP(mean): --  RR: 18 (20 Jul 2020 20:57) (17 - 18)  SpO2: 98% (20 Jul 2020 20:57) (97% - 99%)    CAPILLARY BLOOD GLUCOSE        I&O's Summary    19 Jul 2020 07:01  -  20 Jul 2020 07:00  --------------------------------------------------------  IN: 360 mL / OUT: 0 mL / NET: 360 mL    20 Jul 2020 07:01  -  21 Jul 2020 04:43  --------------------------------------------------------  IN: 240 mL / OUT: 0 mL / NET: 240 mL        PHYSICAL EXAM  GENERAL: NAD  HEAD:  Atraumatic  EYES: EOMI, PERRLA, conjunctiva and sclera clear  NECK: Supple, No JVD  CHEST/LUNG: Clear to auscultation bilaterally; No w/r/r  HEART: Regular rate and rhythm; No murmurs, rubs, or gallops  ABDOMEN: Soft, Nontender, Nondistended; Bowel sounds present  EXTREMITIES:  2+ Peripheral Pulses, No clubbing, cyanosis, or edema  NEURO/PSYCH: AAOx3, nonfocal  SKIN: No rashes or lesions      LABS:    Hemoglobin: 16.6 g/dL (07-18 @ 05:25)  Hemoglobin: 16.1 g/dL (07-16 @ 06:40)      Creatinine Trend: 0.66<--, 0.69<--, 0.80<--, 0.58<--, 0.64<--

## 2020-07-20 NOTE — PROGRESS NOTE BEHAVIORAL HEALTH - NSBHFUPINTERVALHXFT_PSY_A_CORE
Patient seen for f/u of suicidality and alcohol withdrawal.     CIWAs have been 2-5. Received Ativan prn yesterday x1 at 1630 for anxiety. Pt denies any further SI at this point, says he only has SI when he's intoxicated and currently feels safe with no thoughts to harm himself. We discussed the option of going to an inpatient rehab instead of [facility], he agrees to this, but [facility] to being on [facility]. Patient denies AH/VH. He is oriented x3 today, although not to exact name of hospital but knows he's between [place] and [place].     He continues to have slight bilateral hand tremor.

## 2020-07-20 NOTE — PROGRESS NOTE BEHAVIORAL HEALTH - SUMMARY
35 y/o M, single, non-domiciled, well supported by friends, unemployed, no pertinent past psychiatric history, no inpatient psych admission, current medical history of alcohol, cocaine abuse disorder. Received court mandated rehab in 2012 for cocaine arrest. Admitted for A/W after pt visited crisis clinic prior to ED looking for detox. Pt also has left 6th rib fx. No aggression on the floor, currently on 1:1, calm and cooperative. Psychiatry consulted for history of SI prior to ED arrival. CIWAs have been 4-7, pt with prominent B/L hand tremors on exam. Patient currently endorsing SI with plan to kill himself outside of the hospital (although denies plan to harm himself while here), +depressive sx in the setting of ongoing EtOH and cocaine use, no psychotic symptoms.     Pt endorsed two prior SI attempts associated with a depressed mood x2 months. Diagnosis of Substance induced depressive disorder, r/o MDD. Pt also with Alcohol withdrawal and alcohol abuse and cocaine abuse disorder.     7/18: unable to engage patient in full interview due to patient's refusal to wake up for interview.    7/19: patient with continued SI with plan he is not fully disclosing and some delirium; no longer requiring Ativan PRNs; CIWAs have been 5    7/20: No longer with SI, reports he only feels that when he's intoxicated, currently contracts for safety and does not require inpatient psych admission.     Recommendations:   - stopped  librium  as per team  -c/w CIWA protocol   -Co 1:1 no longer needed for psychiatric reason   -Will follow

## 2020-07-21 PROCEDURE — 99233 SBSQ HOSP IP/OBS HIGH 50: CPT

## 2020-07-21 PROCEDURE — 99232 SBSQ HOSP IP/OBS MODERATE 35: CPT | Mod: GC

## 2020-07-21 RX ORDER — SERTRALINE 25 MG/1
1 TABLET, FILM COATED ORAL
Qty: 0 | Refills: 0 | DISCHARGE
Start: 2020-07-21

## 2020-07-21 RX ORDER — PANTOPRAZOLE SODIUM 20 MG/1
1 TABLET, DELAYED RELEASE ORAL
Qty: 0 | Refills: 0 | DISCHARGE
Start: 2020-07-21

## 2020-07-21 RX ORDER — LANOLIN ALCOHOL/MO/W.PET/CERES
1 CREAM (GRAM) TOPICAL
Qty: 0 | Refills: 0 | DISCHARGE
Start: 2020-07-21

## 2020-07-21 RX ORDER — FOLIC ACID 0.8 MG
1 TABLET ORAL
Qty: 0 | Refills: 0 | DISCHARGE
Start: 2020-07-21

## 2020-07-21 RX ORDER — SERTRALINE 25 MG/1
25 TABLET, FILM COATED ORAL ONCE
Refills: 0 | Status: COMPLETED | OUTPATIENT
Start: 2020-07-21 | End: 2020-07-21

## 2020-07-21 RX ORDER — SERTRALINE 25 MG/1
25 TABLET, FILM COATED ORAL DAILY
Refills: 0 | Status: DISCONTINUED | OUTPATIENT
Start: 2020-07-21 | End: 2020-07-21

## 2020-07-21 RX ORDER — SERTRALINE 25 MG/1
50 TABLET, FILM COATED ORAL DAILY
Refills: 0 | Status: DISCONTINUED | OUTPATIENT
Start: 2020-07-22 | End: 2020-07-22

## 2020-07-21 RX ORDER — SERTRALINE 25 MG/1
50 TABLET, FILM COATED ORAL DAILY
Refills: 0 | Status: DISCONTINUED | OUTPATIENT
Start: 2020-07-21 | End: 2020-07-21

## 2020-07-21 RX ADMIN — Medication 650 MILLIGRAM(S): at 23:46

## 2020-07-21 RX ADMIN — SERTRALINE 25 MILLIGRAM(S): 25 TABLET, FILM COATED ORAL at 15:18

## 2020-07-21 RX ADMIN — Medication 1 TABLET(S): at 12:14

## 2020-07-21 RX ADMIN — Medication 1 MILLIGRAM(S): at 12:14

## 2020-07-21 RX ADMIN — Medication 3 MILLIGRAM(S): at 21:03

## 2020-07-21 RX ADMIN — ENOXAPARIN SODIUM 40 MILLIGRAM(S): 100 INJECTION SUBCUTANEOUS at 12:13

## 2020-07-21 RX ADMIN — SERTRALINE 25 MILLIGRAM(S): 25 TABLET, FILM COATED ORAL at 12:14

## 2020-07-21 NOTE — PROGRESS NOTE BEHAVIORAL HEALTH - CASE SUMMARY
Pt seen/evaluated in follow up with Dr. Swanson, agree with above. Pt now voicing SI again to jump off roof. Pt will be transferred on 2PC status to [facility] for further stabilization for danger to self, once [facility] bed available. C/w CO 1:1. Will start pt on Zoloft 25mg po daily. Will follow. Pt seen/evaluated in follow up with Dr. Swanson, agree with above. Pt now voicing SI again to jump off roof. Pt will be transferred on 2PC status to [facility] for further stabilization for danger to self, once [facility] bed available. C/w CO 1:1. Will titrate Zoloft to 50mg po daily. Will follow.

## 2020-07-21 NOTE — CHART NOTE - NSCHARTNOTEFT_GEN_A_CORE
Writer received following message from [facility]  Jose Ramon Redd:   I called RNs room, no answer again. But I did leave the message on his emergency contact's phone. If I don't receive any call backs from them today, we will make a second attempt tomorrow.    Writer left message for covering unit SW (Chiquis) informing of [facility] lyn Redd attempt.

## 2020-07-21 NOTE — PHYSICAL THERAPY INITIAL EVALUATION ADULT - PERTINENT HX OF CURRENT PROBLEM, REHAB EVAL
Patient is 34 year old male admitted with history of alcohol and cocaine use disorder, presents with tremors and diaphoresis,.

## 2020-07-21 NOTE — PROGRESS NOTE ADULT - SUBJECTIVE AND OBJECTIVE BOX
Internal Medicine Progress Note    Janell Rush MD PGY-1  Pager: [phone]; [facility] # [id number]; Team 7 Spectra [phone]  Please page [phone] after 7 pm or after 12 pm on weekends    Patient is a 34y old  Male who presents with a chief complaint of Alcohol withdrawal (21 Jul 2020 06:20)      SUBJECTIVE / OVERNIGHT EVENTS: No acute events o/n. IV removed, no PRNs given. Pt reports overall improvement; pt reporting less nausea and tolerating PO more, less shakes. Pt states he still feels "unsteady on my feet." Pt is amenable to PT consult. Pt feels he would "jump off of a roof" if discharged. Does not report any protective factors.    MEDICATIONS  (STANDING):  enoxaparin Injectable 40 milliGRAM(s) SubCutaneous daily  folic acid 1 milliGRAM(s) Oral daily  multivitamin 1 Tablet(s) Oral daily  pantoprazole    Tablet 40 milliGRAM(s) Oral before breakfast  sertraline 25 milliGRAM(s) Oral daily    MEDICATIONS  (PRN):  acetaminophen   Tablet .. 650 milliGRAM(s) Oral every 6 hours PRN Mild Pain (1 - 3), Moderate Pain (4 - 6)  calcium carbonate    500 mG (Tums) Chewable 1 Tablet(s) Chew three times a day PRN Heartburn  LORazepam     Tablet 2 milliGRAM(s) Oral every 6 hours PRN Anxiety/tremors  LORazepam   Injectable 2 milliGRAM(s) IV Push every 2 hours PRN CIWA-Ar score increase by 2 points and a total score of 7 or less  LORazepam   Injectable 2 milliGRAM(s) IV Push every 1 hour PRN Symptom-triggered: each CIWA -Ar score 8 or GREATER  melatonin 3 milliGRAM(s) Oral at bedtime PRN Insomnia  ondansetron    Tablet 4 milliGRAM(s) Oral every 6 hours PRN Nausea and/or Vomiting    Vital Signs Last 24 Hrs  T(C): 36.4 (21 Jul 2020 07:34), Max: 36.4 (20 Jul 2020 20:57)  T(F): 97.5 (21 Jul 2020 07:34), Max: 97.5 (20 Jul 2020 20:57)  HR: 55 (21 Jul 2020 07:34) (55 - 79)  BP: 101/62 (21 Jul 2020 07:34) (101/62 - 107/72)  BP(mean): --  RR: 17 (21 Jul 2020 07:34) (17 - 18)  SpO2: 99% (21 Jul 2020 07:34) (98% - 99%)    CAPILLARY BLOOD GLUCOSE        I&O's Summary    20 Jul 2020 07:01  -  21 Jul 2020 07:00  --------------------------------------------------------  IN: 240 mL / OUT: 0 mL / NET: 240 mL        PHYSICAL EXAM  GENERAL: NAD  HEAD:  Atraumatic; moist mucus membranes  EYES: EOMI, PERRLA, conjunctiva and sclera clear  NECK: Supple, No JVD  CHEST/LUNG: Clear to auscultation bilaterally; No w/r/r  HEART: Regular rate and rhythm; No murmurs, rubs, or gallops  ABDOMEN: Soft, some diffuse TTP; Nondistended; Bowel sounds present  EXTREMITIES:  2+ Peripheral Pulses, No clubbing, cyanosis, or edema  NEURO/PSYCH: AAOx3, nonfocal  SKIN: No rashes or lesions      LABS:    Hemoglobin: 16.6 g/dL (07-18 @ 05:25)        Creatinine Trend: 0.66<--, 0.69<--, 0.80<--, 0.58<--, 0.64<--

## 2020-07-21 NOTE — PROGRESS NOTE ADULT - ASSESSMENT
34 y.o. M w/ hx of alcohol and cocaine use disorder, p/w SI, admitted for alcohol w/d, s/p 4 day librium taper, pending dispo.

## 2020-07-21 NOTE — PROGRESS NOTE ADULT - PROBLEM SELECTOR PLAN 1
-standing CIWA and symptom triggered CIWA  -s/p 4 day librium taper  -psych to re-evaluate 7/21  -SBIRT  -folate, thiamine, and MV  -VS q4  -SW for rehab?

## 2020-07-21 NOTE — PROGRESS NOTE BEHAVIORAL HEALTH - NSBHFUPINTERVALHXFT_PSY_A_CORE
Patient seen for f/u of suicidality and alcohol withdrawal.     Per RN Chacho, pt was doing fine overnight. Most recent CIWA 1 at 13:00 on 7/20. Pt completed Librium taper yesterday, no prn Ativan overnight. On interview, pt stated that he is feeling depressed and wants to die, with plan of jumping off the roof if given the opportunity. Said that he wants inpatient admission at [facility] for depression. Endorsed poor sleep and poor appetite. Patient seen for f/u of suicidality and alcohol withdrawal.     Per RN Chacho, pt was doing fine overnight. Most recent CIWA 1 at 13:00 on 7/20. Pt completed Librium taper yesterday, no prn Ativan overnight. On interview, pt stated that he is feeling depressed and wants to die, with plan of jumping off the roof if given the opportunity. Said that he wants inpatient admission at [facility] for depression. Endorsed poor sleep and poor appetite.    Per primary team Dr. Lopez, pt is medically cleared for transfer to [facility].

## 2020-07-21 NOTE — PROGRESS NOTE BEHAVIORAL HEALTH - OTHER
in bed SIIP with thoughts of jumping off the roof to kill self poor regarding plan to jump off roof, good regarding help-seeking and wanting admission at [facility]

## 2020-07-21 NOTE — PROGRESS NOTE ADULT - PROBLEM SELECTOR PLAN 5
-VTE ppx: lovenox  -Diet: reg -VTE ppx: lovenox  IMPROVE VTE Individual Risk Assessment        RISK                                                          Points  [  ] Previous VTE                                                3  [  ] Thrombophilia                                             2  [  ] Lower limb paralysis                                   2        (unable to hold up >15 seconds)    [  ] Current Cancer                                             2         (within 6 months)  [ x ] Immobilization > 24 hrs                              1  [  ] ICU/CCU stay > 24 hours                             1  [  ] Age > 60                                                         1  IMPROVE VTE Score:         [     1    ]  Total Risk Factor Score:    0 - 1:   Consider IPC  >2 - 3:  Thromboprophylaxis required (enoxaparin or SQ heparin)        >4:   High Risk: Thromboprophylaxis required (enoxaparin or SQ heparin), optional add IPC  **If CONTRAINDICATION to enoxaparin or SQ heparin, USE IPCs**  -Diet: reg

## 2020-07-21 NOTE — PROGRESS NOTE BEHAVIORAL HEALTH - SUMMARY
35 y/o M, single, non-domiciled, well supported by friends, unemployed, no pertinent past psychiatric history, no inpatient psych admission, current medical history of alcohol, cocaine abuse disorder. Received court mandated rehab in 2012 for cocaine arrest. Admitted for A/W after pt visited crisis clinic prior to ED looking for detox. Pt also has left 6th rib fx. No aggression on the floor, currently on 1:1, calm and cooperative. Psychiatry consulted for history of SI prior to ED arrival. CIWAs had been 4-7, pt with prominent B/L hand tremors on exam. Patient currently endorsing SI with plan to kill himself outside of the hospital (although denies plan to harm himself while here), +depressive sx in the setting of ongoing EtOH and cocaine use, no psychotic symptoms.     Pt endorsed two prior SI attempts associated with a depressed mood x2 months. Diagnosis of Substance induced depressive disorder, r/o MDD. Pt also with Alcohol withdrawal and alcohol abuse and cocaine abuse disorder.     7/18: unable to engage patient in full interview due to patient's refusal to wake up for interview.    7/19: patient with continued SI with plan he is not fully disclosing and some delirium; no longer requiring Ativan PRNs; CIWAs have been 5    7/20: No longer with SI, reports he only feels that when he's intoxicated, currently contracts for safety and does not require inpatient psych admission.     7/21: Patient with continued SI with plan to jump off roof, depression, wants [facility] admission.    Recommendations:   - stopped  librium  as per team  -c/w CIWA protocol   -Co 1:1 no longer needed for psychiatric reason   -pt requires psychiatric inpatient admission due to SI  -Will follow 35 y/o M, single, non-domiciled, well supported by friends, unemployed, no pertinent past psychiatric history, no inpatient psych admission, current medical history of alcohol, cocaine abuse disorder. Received court mandated rehab in 2012 for cocaine arrest. Admitted for A/W after pt visited crisis clinic prior to ED looking for detox. Pt also has left 6th rib fx. No aggression on the floor, currently on 1:1, calm and cooperative. Psychiatry consulted for history of SI prior to ED arrival. CIWAs had been 4-7, pt with prominent B/L hand tremors on exam. Patient currently endorsing SI with plan to kill himself outside of the hospital (although denies plan to harm himself while here), +depressive sx in the setting of ongoing EtOH and cocaine use, no psychotic symptoms.     Pt endorsed two prior SI attempts associated with a depressed mood x2 months. Diagnosis of Substance induced depressive disorder, r/o MDD. Pt also with Alcohol withdrawal and alcohol abuse and cocaine abuse disorder.     7/18: unable to engage patient in full interview due to patient's refusal to wake up for interview.    7/19: patient with continued SI with plan he is not fully disclosing and some delirium; no longer requiring Ativan PRNs; CIWAs have been 5    7/20: No longer with SI, reports he only feels that when he's intoxicated, currently contracts for safety and does not require inpatient psych admission.     7/21: Patient with continued SI with plan to jump off roof, depression, wants [facility] admission.    Recommendations:   - start Zoloft 25mg po daily   -c/w CIWA protocol   -Co 1:1 no longer needed for psychiatric reason   -pt requires psychiatric inpatient admission due to SI  -Will follow 35 y/o M, single, non-domiciled, well supported by friends, unemployed, no pertinent past psychiatric history, no inpatient psych admission, current medical history of alcohol, cocaine abuse disorder. Received court mandated rehab in 2012 for cocaine arrest. Admitted for A/W after pt visited crisis clinic prior to ED looking for detox. Pt also has left 6th rib fx. No aggression on the floor, currently on 1:1, calm and cooperative. Psychiatry consulted for history of SI prior to ED arrival. CIWAs had been 4-7, pt with prominent B/L hand tremors on exam. Patient currently endorsing SI with plan to kill himself outside of the hospital (although denies plan to harm himself while here), +depressive sx in the setting of ongoing EtOH and cocaine use, no psychotic symptoms.     Pt endorsed two prior SI attempts associated with a depressed mood x2 months. Diagnosis of Substance induced depressive disorder, r/o MDD. Pt also with Alcohol withdrawal and alcohol abuse and cocaine abuse disorder.     7/18: unable to engage patient in full interview due to patient's refusal to wake up for interview.    7/19: patient with continued SI with plan he is not fully disclosing and some delirium; no longer requiring Ativan PRNs; CIWAs have been 5    7/20: No longer with SI, reports he only feels that when he's intoxicated, currently contracts for safety and does not require inpatient psych admission.     7/21: Patient with continued SI with plan to jump off roof, depression, wants [facility] admission.    Recommendations:   - titrate Zoloft to 50mg po daily to target depression  - c/w CIWA protocol   -Co 1:1 no longer needed for psychiatric reason   -pt requires psychiatric inpatient admission due to SI  -Will follow

## 2020-07-22 ENCOUNTER — INPATIENT (INPATIENT)
Facility: HOSPITAL | Age: 35
LOS: 8 days | Discharge: HOME | End: 2020-07-31
Attending: PSYCHIATRY & NEUROLOGY | Admitting: PSYCHIATRY & NEUROLOGY
Payer: MEDICAID

## 2020-07-22 VITALS
HEART RATE: 58 BPM | OXYGEN SATURATION: 98 % | TEMPERATURE: 97 F | DIASTOLIC BLOOD PRESSURE: 66 MMHG | RESPIRATION RATE: 18 BRPM | SYSTOLIC BLOOD PRESSURE: 113 MMHG

## 2020-07-22 VITALS — WEIGHT: 151.9 LBS | HEIGHT: 68 IN | TEMPERATURE: 98 F

## 2020-07-22 DIAGNOSIS — F33.2 MAJOR DEPRESSIVE DISORDER, RECURRENT SEVERE WITHOUT PSYCHOTIC FEATURES: ICD-10-CM

## 2020-07-22 PROCEDURE — 99239 HOSP IP/OBS DSCHRG MGMT >30: CPT

## 2020-07-22 PROCEDURE — 99233 SBSQ HOSP IP/OBS HIGH 50: CPT

## 2020-07-22 RX ORDER — LANOLIN ALCOHOL/MO/W.PET/CERES
3 CREAM (GRAM) TOPICAL AT BEDTIME
Refills: 0 | Status: DISCONTINUED | OUTPATIENT
Start: 2020-07-22 | End: 2020-07-31

## 2020-07-22 RX ORDER — PANTOPRAZOLE SODIUM 20 MG/1
40 TABLET, DELAYED RELEASE ORAL
Refills: 0 | Status: DISCONTINUED | OUTPATIENT
Start: 2020-07-22 | End: 2020-07-31

## 2020-07-22 RX ORDER — FOLIC ACID 0.8 MG
1 TABLET ORAL DAILY
Refills: 0 | Status: DISCONTINUED | OUTPATIENT
Start: 2020-07-22 | End: 2020-07-31

## 2020-07-22 RX ORDER — SERTRALINE 25 MG/1
50 TABLET, FILM COATED ORAL DAILY
Refills: 0 | Status: DISCONTINUED | OUTPATIENT
Start: 2020-07-22 | End: 2020-07-27

## 2020-07-22 RX ORDER — ACETAMINOPHEN 500 MG
650 TABLET ORAL EVERY 6 HOURS
Refills: 0 | Status: DISCONTINUED | OUTPATIENT
Start: 2020-07-22 | End: 2020-07-31

## 2020-07-22 RX ORDER — SERTRALINE 25 MG/1
1 TABLET, FILM COATED ORAL
Qty: 0 | Refills: 0 | DISCHARGE
Start: 2020-07-22

## 2020-07-22 RX ADMIN — Medication 650 MILLIGRAM(S): at 12:07

## 2020-07-22 RX ADMIN — Medication 3 MILLIGRAM(S): at 20:31

## 2020-07-22 RX ADMIN — Medication 650 MILLIGRAM(S): at 11:03

## 2020-07-22 RX ADMIN — Medication 650 MILLIGRAM(S): at 00:56

## 2020-07-22 RX ADMIN — Medication 1 TABLET(S): at 11:03

## 2020-07-22 RX ADMIN — PANTOPRAZOLE SODIUM 40 MILLIGRAM(S): 20 TABLET, DELAYED RELEASE ORAL at 06:38

## 2020-07-22 RX ADMIN — Medication 1 TABLET(S): at 20:30

## 2020-07-22 RX ADMIN — SERTRALINE 50 MILLIGRAM(S): 25 TABLET, FILM COATED ORAL at 20:30

## 2020-07-22 RX ADMIN — ENOXAPARIN SODIUM 40 MILLIGRAM(S): 100 INJECTION SUBCUTANEOUS at 11:03

## 2020-07-22 RX ADMIN — Medication 1 MILLIGRAM(S): at 21:12

## 2020-07-22 RX ADMIN — SERTRALINE 50 MILLIGRAM(S): 25 TABLET, FILM COATED ORAL at 11:03

## 2020-07-22 RX ADMIN — Medication 1 MILLIGRAM(S): at 11:03

## 2020-07-22 NOTE — PROGRESS NOTE BEHAVIORAL HEALTH - NSBHPTASSESSDT_PSY_A_CORE
17-Jul-2020 09:47
19-Jul-2020 13:17
20-Jul-2020 15:05
22-Jul-2020 10:30
18-Jul-2020 10:40
21-Jul-2020 10:10

## 2020-07-22 NOTE — PROGRESS NOTE BEHAVIORAL HEALTH - MUSCLE TONE / STRENGTH
Other
Other
Normal muscle tone/strength
Normal muscle tone/strength
Other
Normal muscle tone/strength

## 2020-07-22 NOTE — PROGRESS NOTE BEHAVIORAL HEALTH - PRIMARY DX
Substance induced mood disorder

## 2020-07-22 NOTE — PROGRESS NOTE ADULT - PROBLEM SELECTOR PROBLEM 3
Chest wall pain

## 2020-07-22 NOTE — PROGRESS NOTE BEHAVIORAL HEALTH - NSBHADMITCOUNSEL_PSY_A_CORE
instructions for management, treatment and follow up/risk factor reduction/client/family/caregiver education/prognosis
Discussed risks/benefits/side effects/alternatives of Zoloft with patient/risks and benefits of treatment options/instructions for management, treatment and follow up/importance of adherence to chosen treatment/risk factor reduction/client/family/caregiver education/prognosis
instructions for management, treatment and follow up/importance of adherence to chosen treatment/risk factor reduction/client/family/caregiver education/prognosis

## 2020-07-22 NOTE — PROGRESS NOTE BEHAVIORAL HEALTH - NSBHFUPINTERVALHXFT_PSY_A_CORE
Patient seen for f/u of suicidality and alcohol withdrawal.     Pt received prn melatonin, no Ativan prns overnight, no recent CIWA. On interview, pt stated he continued to feel depressed, wants to die, and said he would jump off the roof or out the window if feasible, but cannot do that here and has no plans to kill self in the hospital. Pt mentioned that his father  when pt age 8, mother used crack and pt was "crack baby", pt raised by grandmother who  when pt in his 20's. Pt states he feels like he is "stuck in [his] 20's". Said that he is very motivated to stop drinking, did not respond when asked about confidence to stop. Denied side effects of Zoloft, ok with increasing dose.

## 2020-07-22 NOTE — PROGRESS NOTE BEHAVIORAL HEALTH - NSBHCONSULTMEDPLAN_PSY_A_CORE FT
Rib fracture, pain controlled on Tylenol prn. GERD on pantoprazole 40mg QD and TUMS prn TID. Recent alcohol withdrawal, on mvm, folic acid.

## 2020-07-22 NOTE — PROGRESS NOTE BEHAVIORAL HEALTH - RISK ASSESSMENT
Substance abuse disorder and current personal/ legal situation, current SIIP, prior SAs. Pt is currently at an elevated risk for self-injurious behavior, and requires psychiatric hosp at this time.
Substance abuse disorder and current personal/ legal situation
Substance abuse disorder and current personal/ legal situation, current SIIP, prior SAs. Pt is currently at an elevated risk for self-injurious behavior, and requires psychiatric hosp at this time.
Substance abuse disorder and current personal/ legal situation

## 2020-07-22 NOTE — PROGRESS NOTE ADULT - REASON FOR ADMISSION
Alcohol withdrawal

## 2020-07-22 NOTE — PROGRESS NOTE BEHAVIORAL HEALTH - NSBHCHARTREVIEWLAB_PSY_A_CORE FT
16.6   6.54  )-----------( 168      ( 18 Jul 2020 05:25 )             50.9     07-18    137  |  102  |  17  ----------------------------<  86  3.8   |  22  |  0.66    Ca    8.9      18 Jul 2020 05:25  Phos  3.4     07-18  Mg     1.9     07-18
none recent
16.1   5.24  )-----------( 143      ( 16 Jul 2020 06:40 )             46.3   07-17    136  |  100  |  20  ----------------------------<  135<H>  4.0   |  26  |  0.69    Ca    8.9      17 Jul 2020 05:50  Phos  3.4     07-17  Mg     1.9     07-17    TPro  6.4  /  Alb  3.4  /  TBili  0.6  /  DBili  x   /  AST  37  /  ALT  22  /  AlkPhos  83  07-16  LIVER FUNCTIONS - ( 16 Jul 2020 06:40 )  Alb: 3.4 g/dL / Pro: 6.4 g/dL / ALK PHOS: 83 u/L / ALT: 22 u/L / AST: 37 u/L / GGT: x
none recent

## 2020-07-22 NOTE — PROGRESS NOTE BEHAVIORAL HEALTH - NSBHCHARTREVIEWVS_PSY_A_CORE FT
Vital Signs Last 24 Hrs  T(C): 36.4 (18 Jul 2020 05:15), Max: 36.8 (17 Jul 2020 17:00)  T(F): 97.6 (18 Jul 2020 05:15), Max: 98.2 (17 Jul 2020 17:00)  HR: 47 (18 Jul 2020 05:15) (47 - 72)  BP: 94/62 (18 Jul 2020 05:15) (94/62 - 126/74)  BP(mean): --  RR: 18 (18 Jul 2020 05:15) (17 - 18)  SpO2: 99% (18 Jul 2020 05:15) (98% - 100%)
Vital Signs Last 24 Hrs  T(C): 36.3 (20 Jul 2020 13:00), Max: 36.5 (19 Jul 2020 21:03)  T(F): 97.4 (20 Jul 2020 13:00), Max: 97.7 (19 Jul 2020 21:03)  HR: 75 (20 Jul 2020 13:00) (60 - 99)  BP: 107/72 (20 Jul 2020 13:00) (103/60 - 112/71)  BP(mean): --  RR: 17 (20 Jul 2020 09:00) (16 - 18)  SpO2: 99% (20 Jul 2020 13:00) (97% - 100%)
Vital Signs Last 24 Hrs  T(C): 36.4 (22 Jul 2020 05:20), Max: 36.4 (21 Jul 2020 21:12)  T(F): 97.5 (22 Jul 2020 05:20), Max: 97.5 (21 Jul 2020 21:12)  HR: 60 (22 Jul 2020 05:20) (58 - 60)  BP: 96/60 (22 Jul 2020 07:19) (90/60 - 104/65)  BP(mean): --  RR: 18 (22 Jul 2020 05:20) (18 - 18)  SpO2: 99% (22 Jul 2020 05:20) (98% - 99%)
Vital Signs Last 24 Hrs  T(C): 36.3 (17 Jul 2020 05:07), Max: 36.4 (16 Jul 2020 17:00)  T(F): 97.4 (17 Jul 2020 05:07), Max: 97.6 (17 Jul 2020 02:42)  HR: 60 (17 Jul 2020 05:07) (52 - 60)  BP: 106/71 (17 Jul 2020 05:07) (100/62 - 129/79)  RR: 17 (17 Jul 2020 05:07) (16 - 17)  SpO2: 95% RA(17 Jul 2020 05:07) (95% - 100%)
Vital Signs Last 24 Hrs  T(C): 36.4 (21 Jul 2020 07:34), Max: 36.4 (20 Jul 2020 20:57)  T(F): 97.5 (21 Jul 2020 07:34), Max: 97.5 (20 Jul 2020 20:57)  HR: 55 (21 Jul 2020 07:34) (55 - 79)  BP: 101/62 (21 Jul 2020 07:34) (101/62 - 107/72)  BP(mean): --  RR: 17 (21 Jul 2020 07:34) (17 - 18)  SpO2: 99% (21 Jul 2020 07:34) (98% - 99%)

## 2020-07-22 NOTE — PROGRESS NOTE BEHAVIORAL HEALTH - THOUGHT PROCESS
Linear
Perseverative/Illogical
Perseverative/Illogical
Linear/Overinclusive
Linear/Normal reasoning
Linear/Normal reasoning

## 2020-07-22 NOTE — PROGRESS NOTE BEHAVIORAL HEALTH - NSBHADMITCOORDWITH_PSY_A_CORE
Discussed plan of care with patient and primary team/medical staff
medical staff
Arranging transfer to [facility] for further stabilization/medical staff/social work

## 2020-07-22 NOTE — PROGRESS NOTE BEHAVIORAL HEALTH - NSBHFUPINTERVALCCFT_PSY_A_CORE
"I can't get any sleep in here."
"I feel Depressed"
"I want to jump off the roof."
"Leave me alone."
"Please come back later"
"I want to jump off the roof."

## 2020-07-22 NOTE — PROGRESS NOTE BEHAVIORAL HEALTH - NSBHFUPREASONCONS_PSY_A_CORE
suicidality/alcohol
suicidality/alcohol
suicidality/alcohol/other substance
suicidality/depression/other substance
suicidality/alcohol/other substance
suicidality/depression/other substance

## 2020-07-22 NOTE — PROGRESS NOTE ADULT - ASSESSMENT
34 y.o. M w/ hx of alcohol and cocaine use disorder, p/w SI, admitted for alcohol w/d, s/p 4 day librium taper, pending dispo. 34 y.o. M w/ hx of alcohol and cocaine use disorder, p/w SI, admitted for alcohol w/d, s/p 4 day librium taper, pending [facility] transfer.

## 2020-07-22 NOTE — PROGRESS NOTE ADULT - PROBLEM SELECTOR PROBLEM 1
Alcohol withdrawal, uncomplicated

## 2020-07-22 NOTE — PROGRESS NOTE ADULT - PROBLEM SELECTOR PLAN 5
-VTE ppx: lovenox  IMPROVE VTE Individual Risk Assessment        RISK                                                          Points  [  ] Previous VTE                                                3  [  ] Thrombophilia                                             2  [  ] Lower limb paralysis                                   2        (unable to hold up >15 seconds)    [  ] Current Cancer                                             2         (within 6 months)  [ x ] Immobilization > 24 hrs                              1  [  ] ICU/CCU stay > 24 hours                             1  [  ] Age > 60                                                         1  IMPROVE VTE Score:         [     1    ]  Total Risk Factor Score:    0 - 1:   Consider IPC  >2 - 3:  Thromboprophylaxis required (enoxaparin or SQ heparin)        >4:   High Risk: Thromboprophylaxis required (enoxaparin or SQ heparin), optional add IPC  **If CONTRAINDICATION to enoxaparin or SQ heparin, USE IPCs**  -Diet: reg -VTE ppx: lovenox                                    IMPROVE VTE Score:         [     1    ]  Total Risk Factor Score:    0 - 1:   Consider IPC  >2 - 3:  Thromboprophylaxis required (enoxaparin or SQ heparin)        >4:   High Risk: Thromboprophylaxis required (enoxaparin or SQ heparin), optional add IPC  **If CONTRAINDICATION to enoxaparin or SQ heparin, USE IPCs**  -Diet: reg

## 2020-07-22 NOTE — PROGRESS NOTE ADULT - PROBLEM SELECTOR PLAN 4
-pain control with Tylenol 650 q6 PRN

## 2020-07-22 NOTE — PROGRESS NOTE BEHAVIORAL HEALTH - CASE SUMMARY
Pt seen/evaluated in follow up with Dr. Swanson, agree with above. Pt to be transferred today on 9.27 involuntary status to [facility] for further stabilization and treatment for danger to himself. No CO 1:1 required once at [facility]. C/w sertraline and melatonin as written, pt completed librium taper with no s/sx of EtOH withdrawal at this time.

## 2020-07-22 NOTE — PROGRESS NOTE BEHAVIORAL HEALTH - NSBHCONSULTFOLLOWDETAILS_PSY_A_CORE FT
Pt will likely require inpatient psych admission once medically stable
-Pt requires admission to [facility] due to SI and risk for self-injurious behavior.
Pt will likely require inpatient psych admission once medically stable
Pt will likely require inpatient psych admission once medically stable
-Pt requires admission to [facility] due to SI and risk for self-injurious behavior.
Pt will likely require inpatient psych admission once medically stable

## 2020-07-22 NOTE — PROGRESS NOTE BEHAVIORAL HEALTH - OTHER
superficially cooperative in bed difficult to interrupt neutral to depressed SIIP with thoughts of jumping off the roof to kill self poor regarding plan to jump off roof, good regarding help-seeking and wanting admission at [facility]

## 2020-07-22 NOTE — PROGRESS NOTE BEHAVIORAL HEALTH - ORIENTATION OTHER
unable to fully assess

## 2020-07-22 NOTE — PROGRESS NOTE ADULT - ATTENDING COMMENTS
34 y.o. Male w/ hx Etoh and cocaine abuse p/w Suicidal ideation and Etoh withdrawal. C/w librium taper until 6/18. CIWA score 5. Patient for [facility] placement when medically clear.
34 y.o. Male w/ hx Etoh and cocaine abuse p/w Suicidal ideation and Etoh withdrawal. C/w librium taper until 6/18. Patient for [facility] placement when medically clear.
Pt seen and examined with HS on above date.  Agree with above HS note.  Plan discussed with House staff.    34 M, recent incarceration, released several months ago, states he has been drinking for approximately 1 month, approximately a case of beer daily plus vodka, cocaine use.  Prior hospitalization for ETOH withdrawal with ICU stay for prior withdrawal from ETOH.  CIWA monitoring with taper and symptom-triggered bnzs.  Pt high risk for DTs.   1 to 1 for suicidal ideation, psych eval-->will likely need inpatient psych.  Zofran for nausea.
Spoke with Dr. Perez from Psychiatry.  No longer with SI, more ETOH/cocaine related.  Spoke to SW re: EtOH rehab for discharge.  Stable for d/c once safe discharge identified.
Spoke with Dr. Perez, psych.  Persistant SI.  Plan for xfer to [facility].  Medically stable for d/c when bed available.
Stable for d/c to [facility]. Psych and SW appreciated! 40 min spent coordinating discharge.
34 y.o. Male w/ hx Etoh and cocaine abuse p/w Suicidal ideation and Etoh withdrawal. s/p course of librium. Patient for [facility] placement tomorrow.

## 2020-07-22 NOTE — PROGRESS NOTE ADULT - PROBLEM SELECTOR PLAN 3
-neg trop  -repeat EKG  -rib fx on CXR

## 2020-07-22 NOTE — PROGRESS NOTE BEHAVIORAL HEALTH - SECONDARY DX2
Alcohol withdrawal, uncomplicated

## 2020-07-22 NOTE — DISCHARGE NOTE NURSING/CASE MANAGEMENT/SOCIAL WORK - NSDCFUADDAPPT_GEN_ALL_CORE_FT
Please follow up with the [facility] dept of substance abuse within 1-2 weeks of discharge for management of your substance use.  Please follow up with the [facility] outpatient clinic within 1-2 weeks of discharge for further psychiatric management.

## 2020-07-22 NOTE — PROGRESS NOTE BEHAVIORAL HEALTH - SUMMARY
35 y/o M, single, non-domiciled, well supported by friends, unemployed, no pertinent past psychiatric history, no inpatient psych admission, current medical history of alcohol, cocaine abuse disorder. Received court mandated rehab in 2012 for cocaine arrest. Admitted for A/W after pt visited crisis clinic prior to ED looking for detox. Pt also has left 6th rib fx. No aggression on the floor, currently on 1:1, calm and cooperative. Psychiatry consulted for history of SI prior to ED arrival. CIWAs had been 4-7, pt with prominent B/L hand tremors on exam. Patient currently endorsing SI with plan to kill himself outside of the hospital (although denies plan to harm himself while here), +depressive sx in the setting of ongoing EtOH and cocaine use, no psychotic symptoms.     Pt endorsed two prior SI attempts associated with a depressed mood x2 months. Diagnosis of Substance induced depressive disorder, r/o MDD. Pt also with Alcohol withdrawal and alcohol abuse and cocaine abuse disorder.     7/18: unable to engage patient in full interview due to patient's refusal to wake up for interview.    7/19: patient with continued SI with plan he is not fully disclosing and some delirium; no longer requiring Ativan PRNs; CIWAs have been 5    7/20: No longer with SI, reports he only feels that when he's intoxicated, currently contracts for safety and does not require inpatient psych admission.     7/21: Patient with continued SI with plan to jump off roof, depression, wants [facility] admission.    7/22: continued SIIP to jump off roof, depressed    Recommendations:   -Zoloft 50mg po daily to target depression  -Co 1:1  -pt requires psychiatric inpatient admission due to SI  -Will follow

## 2020-07-22 NOTE — PROGRESS NOTE ADULT - PROBLEM SELECTOR PROBLEM 2
Suicidal ideation

## 2020-07-22 NOTE — PROGRESS NOTE ADULT - PROBLEM SELECTOR PLAN 1
-standing CIWA and symptom triggered CIWA  -s/p 4 day librium taper  -psych to re-evaluate 7/21  -SBIRT  -folate, thiamine, and MV  -VS q4  -SW for rehab? -standing CIWA and symptom triggered CIWA  -s/p 4 day librium taper  -per psych, transfer to inpt psych at [facility]  -SBIRT  -folate, thiamine, and MV  -VS q4  -SW for rehab?

## 2020-07-22 NOTE — PROGRESS NOTE BEHAVIORAL HEALTH - NSBHATTESTSEENBY_PSY_A_CORE
Attending Psychiatrist supervising NP/Trainee, meeting pt...
attending Psychiatrist without NP/Trainee
attending Psychiatrist without NP/Trainee
Attending Psychiatrist supervising NP/Trainee, meeting pt...

## 2020-07-22 NOTE — PROGRESS NOTE ADULT - SUBJECTIVE AND OBJECTIVE BOX
Internal Medicine Progress Note    Janell Rush MD PGY-1  Pager: [phone]; [facility] # [phone]; Team 7 Spectra [phone]  Please page [phone] after 7 pm or after 12 pm on weekends    Patient is a 34y old  Male who presents with a chief complaint of Alcohol withdrawal (22 Jul 2020 06:24)      SUBJECTIVE / OVERNIGHT EVENTS: No acute events o/n. No PRNs given. At bedside, pt is engaged, A&Ox3, with no complaints. Denies CP, SOB, n/v. Pt reports improved abd pain and better PO and fluid intake. Pt still feeling "weak when walking," but reports walking down hallway yesterday with assistance. Denies tremors and sweating. Pt awaiting bed at [facility].    MEDICATIONS  (STANDING):  enoxaparin Injectable 40 milliGRAM(s) SubCutaneous daily  folic acid 1 milliGRAM(s) Oral daily  multivitamin 1 Tablet(s) Oral daily  pantoprazole    Tablet 40 milliGRAM(s) Oral before breakfast  sertraline 50 milliGRAM(s) Oral daily    MEDICATIONS  (PRN):  acetaminophen   Tablet .. 650 milliGRAM(s) Oral every 6 hours PRN Mild Pain (1 - 3), Moderate Pain (4 - 6)  calcium carbonate    500 mG (Tums) Chewable 1 Tablet(s) Chew three times a day PRN Heartburn  LORazepam     Tablet 2 milliGRAM(s) Oral every 6 hours PRN Anxiety/tremors  LORazepam   Injectable 2 milliGRAM(s) IV Push every 2 hours PRN CIWA-Ar score increase by 2 points and a total score of 7 or less  LORazepam   Injectable 2 milliGRAM(s) IV Push every 1 hour PRN Symptom-triggered: each CIWA -Ar score 8 or GREATER  melatonin 3 milliGRAM(s) Oral at bedtime PRN Insomnia  ondansetron    Tablet 4 milliGRAM(s) Oral every 6 hours PRN Nausea and/or Vomiting    Vital Signs Last 24 Hrs  T(C): 36.4 (22 Jul 2020 05:20), Max: 36.4 (21 Jul 2020 21:12)  T(F): 97.5 (22 Jul 2020 05:20), Max: 97.5 (21 Jul 2020 21:12)  HR: 60 (22 Jul 2020 05:20) (58 - 60)  BP: 96/60 (22 Jul 2020 07:19) (90/60 - 104/65)  BP(mean): --  RR: 18 (22 Jul 2020 05:20) (18 - 18)  SpO2: 99% (22 Jul 2020 05:20) (98% - 99%)    CAPILLARY BLOOD GLUCOSE        I&O's Summary    21 Jul 2020 07:01  -  22 Jul 2020 07:00  --------------------------------------------------------  IN: 300 mL / OUT: 0 mL / NET: 300 mL        PHYSICAL EXAM  GENERAL: NAD  HEAD:  Atraumatic  EYES: EOMI, PERRLA, conjunctiva and sclera clear  NECK: Supple, No JVD  CHEST/LUNG: Clear to auscultation bilaterally; No w/r/r  HEART: Regular rate and rhythm; No murmurs, rubs, or gallops  ABDOMEN: Soft, some TTP in epigastric region; Nondistended; Bowel sounds present  EXTREMITIES:  2+ Peripheral Pulses, No clubbing, cyanosis, or edema  NEURO/PSYCH: AAOx3, nonfocal  SKIN: No rashes or lesions      LABS:    Hemoglobin: 16.6 g/dL (07-18 @ 05:25)      Creatinine Trend: 0.66<--, 0.69<--, 0.80<--, 0.58<--, 0.64<--

## 2020-07-23 PROCEDURE — 99223 1ST HOSP IP/OBS HIGH 75: CPT

## 2020-07-23 PROCEDURE — 99232 SBSQ HOSP IP/OBS MODERATE 35: CPT | Mod: GC

## 2020-07-23 RX ORDER — GABAPENTIN 400 MG/1
300 CAPSULE ORAL DAILY
Refills: 0 | Status: DISCONTINUED | OUTPATIENT
Start: 2020-07-23 | End: 2020-07-25

## 2020-07-23 RX ORDER — IBUPROFEN 200 MG
400 TABLET ORAL ONCE
Refills: 0 | Status: COMPLETED | OUTPATIENT
Start: 2020-07-23 | End: 2020-07-23

## 2020-07-23 RX ORDER — HYDROXYZINE HCL 10 MG
25 TABLET ORAL ONCE
Refills: 0 | Status: COMPLETED | OUTPATIENT
Start: 2020-07-23 | End: 2020-07-23

## 2020-07-23 RX ORDER — MIRTAZAPINE 45 MG/1
15 TABLET, ORALLY DISINTEGRATING ORAL AT BEDTIME
Refills: 0 | Status: DISCONTINUED | OUTPATIENT
Start: 2020-07-23 | End: 2020-07-29

## 2020-07-23 RX ORDER — IBUPROFEN 200 MG
400 TABLET ORAL EVERY 6 HOURS
Refills: 0 | Status: DISCONTINUED | OUTPATIENT
Start: 2020-07-23 | End: 2020-07-24

## 2020-07-23 RX ORDER — HYDROXYZINE HCL 10 MG
50 TABLET ORAL EVERY 6 HOURS
Refills: 0 | Status: DISCONTINUED | OUTPATIENT
Start: 2020-07-23 | End: 2020-07-31

## 2020-07-23 RX ORDER — NICOTINE POLACRILEX 2 MG
2 GUM BUCCAL EVERY 4 HOURS
Refills: 0 | Status: DISCONTINUED | OUTPATIENT
Start: 2020-07-23 | End: 2020-07-31

## 2020-07-23 RX ADMIN — Medication 400 MILLIGRAM(S): at 23:20

## 2020-07-23 RX ADMIN — PANTOPRAZOLE SODIUM 40 MILLIGRAM(S): 20 TABLET, DELAYED RELEASE ORAL at 06:34

## 2020-07-23 RX ADMIN — Medication 1 TABLET(S): at 09:03

## 2020-07-23 RX ADMIN — Medication 400 MILLIGRAM(S): at 20:38

## 2020-07-23 RX ADMIN — Medication 25 MILLIGRAM(S): at 12:50

## 2020-07-23 RX ADMIN — SERTRALINE 50 MILLIGRAM(S): 25 TABLET, FILM COATED ORAL at 09:03

## 2020-07-23 RX ADMIN — GABAPENTIN 300 MILLIGRAM(S): 400 CAPSULE ORAL at 16:35

## 2020-07-23 RX ADMIN — Medication 650 MILLIGRAM(S): at 06:21

## 2020-07-23 RX ADMIN — Medication 400 MILLIGRAM(S): at 22:15

## 2020-07-23 RX ADMIN — Medication 1 MILLIGRAM(S): at 09:03

## 2020-07-23 RX ADMIN — Medication 2 MILLIGRAM(S): at 16:35

## 2020-07-23 RX ADMIN — Medication 400 MILLIGRAM(S): at 22:07

## 2020-07-23 RX ADMIN — Medication 650 MILLIGRAM(S): at 01:30

## 2020-07-23 RX ADMIN — Medication 650 MILLIGRAM(S): at 22:28

## 2020-07-23 RX ADMIN — Medication 650 MILLIGRAM(S): at 23:20

## 2020-07-23 RX ADMIN — MIRTAZAPINE 15 MILLIGRAM(S): 45 TABLET, ORALLY DISINTEGRATING ORAL at 20:25

## 2020-07-23 NOTE — CONSULT NOTE ADULT - ASSESSMENT
34 y.o. M w/ hx of alcohol and cocaine use disorder, p/w SI, admitted for alcohol w/d, s/p 4 day librium taper, now transferred to  [facility] for inpt psych admission for suicidal thoughts and plan. Medicine consulted for co-management.     # Alcohol withdrawal, uncomplicated.     -standing CIWA and symptom triggered CIWA  -s/p 4 day librium taper  -folate, thiamine, and MV      #Suicidal ideation.    - management per primary  psych team     #Chest wall pain. : -neg trop  -rib fx on CXR. Rib fracture.  Plan: -pain control with Tylenol 650 q6 PRN. 34 y.o. M w/ hx of alcohol and cocaine use disorder, p/w SI, admitted for alcohol w/d, s/p 4 day librium taper, now transferred to  [facility] for inpt psych admission for suicidal thoughts and plan. Medicine consulted for co-management.     # Alcohol withdrawal, uncomplicated.     -standing CIWA and symptom triggered CIWA  -s/p 4 day librium taper  -folate, thiamine, and MV    #anxiety: does not appear to be related to withdrawal as patient is s/p taper.   - Continue atarax PRN for anxiety  - Can consider gabapentin for both anxiety as well as ETOH craving       #Suicidal ideation.    - management per primary  psych team     #Chest wall pain. : -neg trop  -rib fx on CXR. Rib fracture.  Plan: -pain control with Tylenol 650 q6 PRN. 34 y.o. M w/ hx of alcohol and cocaine use disorder, p/w SI, admitted for alcohol w/d, s/p 4 day librium taper, now transferred to  [facility] for inpt psych admission for suicidal thoughts and plan. Medicine consulted for co-management.     # Alcohol withdrawal, uncomplicated.     -standing CIWA and symptom triggered CIWA  -s/p 4 day librium taper  -folate, thiamine, and MV    #anxiety: does not appear to be related to withdrawal as patient is s/p taper.   - Continue atarax PRN for anxiety - will order stat dose of atarax for now, and re-assess for effect - unclear if pt. aware of PRN - as he has not received since transfer   - Can consider gabapentin for both anxiety as well as ETOH craving       #Suicidal ideation.    - management per primary  psych team     #Chest wall pain. : -neg trop  -rib fx on CXR. Rib fracture.  Plan: -pain control with Tylenol 650 q6 PRN.
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
## 2020-07-24 LAB
ALBUMIN SERPL ELPH-MCNC: 4.3 G/DL — SIGNIFICANT CHANGE UP (ref 3.3–5)
ALP SERPL-CCNC: 92 U/L — SIGNIFICANT CHANGE UP (ref 40–120)
ALT FLD-CCNC: 35 U/L — SIGNIFICANT CHANGE UP (ref 4–41)
ANION GAP SERPL CALC-SCNC: 8 MMO/L — SIGNIFICANT CHANGE UP (ref 7–14)
AST SERPL-CCNC: 23 U/L — SIGNIFICANT CHANGE UP (ref 4–40)
BASOPHILS # BLD AUTO: 0.07 K/UL — SIGNIFICANT CHANGE UP (ref 0–0.2)
BASOPHILS NFR BLD AUTO: 0.7 % — SIGNIFICANT CHANGE UP (ref 0–2)
BILIRUB SERPL-MCNC: 0.2 MG/DL — SIGNIFICANT CHANGE UP (ref 0.2–1.2)
BUN SERPL-MCNC: 19 MG/DL — SIGNIFICANT CHANGE UP (ref 7–23)
CALCIUM SERPL-MCNC: 9.3 MG/DL — SIGNIFICANT CHANGE UP (ref 8.4–10.5)
CHLORIDE SERPL-SCNC: 101 MMOL/L — SIGNIFICANT CHANGE UP (ref 98–107)
CO2 SERPL-SCNC: 27 MMOL/L — SIGNIFICANT CHANGE UP (ref 22–31)
CREAT SERPL-MCNC: 0.99 MG/DL — SIGNIFICANT CHANGE UP (ref 0.5–1.3)
EOSINOPHIL # BLD AUTO: 0.16 K/UL — SIGNIFICANT CHANGE UP (ref 0–0.5)
EOSINOPHIL NFR BLD AUTO: 1.6 % — SIGNIFICANT CHANGE UP (ref 0–6)
FOLATE SERPL-MCNC: > 20 NG/ML — HIGH (ref 4.7–20)
GLUCOSE SERPL-MCNC: 58 MG/DL — LOW (ref 70–99)
HCT VFR BLD CALC: 47.8 % — SIGNIFICANT CHANGE UP (ref 39–50)
HGB BLD-MCNC: 15.9 G/DL — SIGNIFICANT CHANGE UP (ref 13–17)
IMM GRANULOCYTES NFR BLD AUTO: 0.6 % — SIGNIFICANT CHANGE UP (ref 0–1.5)
LYMPHOCYTES # BLD AUTO: 3.01 K/UL — SIGNIFICANT CHANGE UP (ref 1–3.3)
LYMPHOCYTES # BLD AUTO: 30.5 % — SIGNIFICANT CHANGE UP (ref 13–44)
MCHC RBC-ENTMCNC: 33.3 % — SIGNIFICANT CHANGE UP (ref 32–36)
MCHC RBC-ENTMCNC: 33.3 PG — SIGNIFICANT CHANGE UP (ref 27–34)
MCV RBC AUTO: 100 FL — SIGNIFICANT CHANGE UP (ref 80–100)
MONOCYTES # BLD AUTO: 1.5 K/UL — HIGH (ref 0–0.9)
MONOCYTES NFR BLD AUTO: 15.2 % — HIGH (ref 2–14)
NEUTROPHILS # BLD AUTO: 5.06 K/UL — SIGNIFICANT CHANGE UP (ref 1.8–7.4)
NEUTROPHILS NFR BLD AUTO: 51.4 % — SIGNIFICANT CHANGE UP (ref 43–77)
NRBC # FLD: 0 K/UL — SIGNIFICANT CHANGE UP (ref 0–0)
PLATELET # BLD AUTO: 198 K/UL — SIGNIFICANT CHANGE UP (ref 150–400)
PMV BLD: 9.1 FL — SIGNIFICANT CHANGE UP (ref 7–13)
POTASSIUM SERPL-MCNC: 4.8 MMOL/L — SIGNIFICANT CHANGE UP (ref 3.5–5.3)
POTASSIUM SERPL-SCNC: 4.8 MMOL/L — SIGNIFICANT CHANGE UP (ref 3.5–5.3)
PROT SERPL-MCNC: 6.6 G/DL — SIGNIFICANT CHANGE UP (ref 6–8.3)
RBC # BLD: 4.78 M/UL — SIGNIFICANT CHANGE UP (ref 4.2–5.8)
RBC # FLD: 12.9 % — SIGNIFICANT CHANGE UP (ref 10.3–14.5)
SODIUM SERPL-SCNC: 136 MMOL/L — SIGNIFICANT CHANGE UP (ref 135–145)
VIT B12 SERPL-MCNC: 803 PG/ML — SIGNIFICANT CHANGE UP (ref 200–900)
WBC # BLD: 9.86 K/UL — SIGNIFICANT CHANGE UP (ref 3.8–10.5)
WBC # FLD AUTO: 9.86 K/UL — SIGNIFICANT CHANGE UP (ref 3.8–10.5)

## 2020-07-24 PROCEDURE — 99232 SBSQ HOSP IP/OBS MODERATE 35: CPT | Mod: GC

## 2020-07-24 RX ORDER — LIDOCAINE 4 G/100G
1 CREAM TOPICAL DAILY
Refills: 0 | Status: DISCONTINUED | OUTPATIENT
Start: 2020-07-24 | End: 2020-07-31

## 2020-07-24 RX ORDER — IBUPROFEN 200 MG
600 TABLET ORAL EVERY 6 HOURS
Refills: 0 | Status: DISCONTINUED | OUTPATIENT
Start: 2020-07-24 | End: 2020-07-31

## 2020-07-24 RX ADMIN — Medication 50 MILLIGRAM(S): at 07:54

## 2020-07-24 RX ADMIN — PANTOPRAZOLE SODIUM 40 MILLIGRAM(S): 20 TABLET, DELAYED RELEASE ORAL at 07:54

## 2020-07-24 RX ADMIN — Medication 600 MILLIGRAM(S): at 14:38

## 2020-07-24 RX ADMIN — LIDOCAINE 1 PATCH: 4 CREAM TOPICAL at 22:12

## 2020-07-24 RX ADMIN — GABAPENTIN 300 MILLIGRAM(S): 400 CAPSULE ORAL at 07:54

## 2020-07-24 RX ADMIN — Medication 1 TABLET(S): at 07:54

## 2020-07-24 RX ADMIN — SERTRALINE 50 MILLIGRAM(S): 25 TABLET, FILM COATED ORAL at 07:54

## 2020-07-24 RX ADMIN — Medication 50 MILLIGRAM(S): at 17:00

## 2020-07-24 RX ADMIN — Medication 1 MILLIGRAM(S): at 07:54

## 2020-07-24 RX ADMIN — MIRTAZAPINE 15 MILLIGRAM(S): 45 TABLET, ORALLY DISINTEGRATING ORAL at 20:41

## 2020-07-24 NOTE — CHART NOTE - NSCHARTNOTEFT_GEN_A_CORE
Called by primary team Re: pain management for rib fracture. Pt. currently on Tyenol, ibuprofen and gabapentin. Per primary team, avoiding opioids given non-opioid pain management per pt. . Discussed exploring lidocaine patch and titrating gabapentin.

## 2020-07-25 PROCEDURE — 99232 SBSQ HOSP IP/OBS MODERATE 35: CPT

## 2020-07-25 RX ORDER — GABAPENTIN 400 MG/1
300 CAPSULE ORAL
Refills: 0 | Status: DISCONTINUED | OUTPATIENT
Start: 2020-07-25 | End: 2020-07-27

## 2020-07-25 RX ORDER — GABAPENTIN 400 MG/1
100 CAPSULE ORAL ONCE
Refills: 0 | Status: COMPLETED | OUTPATIENT
Start: 2020-07-25 | End: 2020-07-25

## 2020-07-25 RX ORDER — NICOTINE POLACRILEX 2 MG
1 GUM BUCCAL DAILY
Refills: 0 | Status: DISCONTINUED | OUTPATIENT
Start: 2020-07-25 | End: 2020-07-31

## 2020-07-25 RX ADMIN — GABAPENTIN 100 MILLIGRAM(S): 400 CAPSULE ORAL at 02:59

## 2020-07-25 RX ADMIN — Medication 1 MILLIGRAM(S): at 08:13

## 2020-07-25 RX ADMIN — PANTOPRAZOLE SODIUM 40 MILLIGRAM(S): 20 TABLET, DELAYED RELEASE ORAL at 08:13

## 2020-07-25 RX ADMIN — Medication 50 MILLIGRAM(S): at 17:49

## 2020-07-25 RX ADMIN — Medication 1 PATCH: at 15:42

## 2020-07-25 RX ADMIN — GABAPENTIN 300 MILLIGRAM(S): 400 CAPSULE ORAL at 20:02

## 2020-07-25 RX ADMIN — Medication 1 TABLET(S): at 08:13

## 2020-07-25 RX ADMIN — Medication 50 MILLIGRAM(S): at 23:45

## 2020-07-25 RX ADMIN — Medication 3 MILLIGRAM(S): at 22:51

## 2020-07-25 RX ADMIN — Medication 50 MILLIGRAM(S): at 12:48

## 2020-07-25 RX ADMIN — GABAPENTIN 300 MILLIGRAM(S): 400 CAPSULE ORAL at 08:13

## 2020-07-25 RX ADMIN — SERTRALINE 50 MILLIGRAM(S): 25 TABLET, FILM COATED ORAL at 08:13

## 2020-07-25 RX ADMIN — MIRTAZAPINE 15 MILLIGRAM(S): 45 TABLET, ORALLY DISINTEGRATING ORAL at 20:02

## 2020-07-26 PROCEDURE — 99232 SBSQ HOSP IP/OBS MODERATE 35: CPT

## 2020-07-26 RX ORDER — TRAZODONE HCL 50 MG
50 TABLET ORAL AT BEDTIME
Refills: 0 | Status: DISCONTINUED | OUTPATIENT
Start: 2020-07-26 | End: 2020-07-27

## 2020-07-26 RX ADMIN — Medication 1 TABLET(S): at 08:01

## 2020-07-26 RX ADMIN — Medication 50 MILLIGRAM(S): at 11:33

## 2020-07-26 RX ADMIN — SERTRALINE 50 MILLIGRAM(S): 25 TABLET, FILM COATED ORAL at 08:01

## 2020-07-26 RX ADMIN — Medication 50 MILLIGRAM(S): at 17:43

## 2020-07-26 RX ADMIN — PANTOPRAZOLE SODIUM 40 MILLIGRAM(S): 20 TABLET, DELAYED RELEASE ORAL at 08:01

## 2020-07-26 RX ADMIN — Medication 3 MILLIGRAM(S): at 20:54

## 2020-07-26 RX ADMIN — GABAPENTIN 300 MILLIGRAM(S): 400 CAPSULE ORAL at 20:04

## 2020-07-26 RX ADMIN — Medication 1 PATCH: at 08:01

## 2020-07-26 RX ADMIN — Medication 1 MILLIGRAM(S): at 08:00

## 2020-07-26 RX ADMIN — GABAPENTIN 300 MILLIGRAM(S): 400 CAPSULE ORAL at 08:00

## 2020-07-26 RX ADMIN — MIRTAZAPINE 15 MILLIGRAM(S): 45 TABLET, ORALLY DISINTEGRATING ORAL at 20:04

## 2020-07-27 PROCEDURE — 99232 SBSQ HOSP IP/OBS MODERATE 35: CPT | Mod: GC

## 2020-07-27 RX ORDER — SERTRALINE 25 MG/1
100 TABLET, FILM COATED ORAL DAILY
Refills: 0 | Status: DISCONTINUED | OUTPATIENT
Start: 2020-07-28 | End: 2020-07-31

## 2020-07-27 RX ORDER — NALTREXONE HYDROCHLORIDE 50 MG/1
50 TABLET, FILM COATED ORAL DAILY
Refills: 0 | Status: DISCONTINUED | OUTPATIENT
Start: 2020-07-27 | End: 2020-07-31

## 2020-07-27 RX ORDER — GABAPENTIN 400 MG/1
300 CAPSULE ORAL
Refills: 0 | Status: DISCONTINUED | OUTPATIENT
Start: 2020-07-27 | End: 2020-07-28

## 2020-07-27 RX ORDER — GABAPENTIN 400 MG/1
300 CAPSULE ORAL ONCE
Refills: 0 | Status: COMPLETED | OUTPATIENT
Start: 2020-07-27 | End: 2020-07-27

## 2020-07-27 RX ORDER — ONDANSETRON 8 MG/1
4 TABLET, FILM COATED ORAL EVERY 6 HOURS
Refills: 0 | Status: DISCONTINUED | OUTPATIENT
Start: 2020-07-27 | End: 2020-07-31

## 2020-07-27 RX ORDER — SERTRALINE 25 MG/1
50 TABLET, FILM COATED ORAL ONCE
Refills: 0 | Status: COMPLETED | OUTPATIENT
Start: 2020-07-27 | End: 2020-07-27

## 2020-07-27 RX ADMIN — Medication 1 MILLIGRAM(S): at 08:30

## 2020-07-27 RX ADMIN — Medication 50 MILLIGRAM(S): at 18:48

## 2020-07-27 RX ADMIN — SERTRALINE 50 MILLIGRAM(S): 25 TABLET, FILM COATED ORAL at 11:44

## 2020-07-27 RX ADMIN — Medication 3 MILLIGRAM(S): at 22:33

## 2020-07-27 RX ADMIN — NALTREXONE HYDROCHLORIDE 50 MILLIGRAM(S): 50 TABLET, FILM COATED ORAL at 11:43

## 2020-07-27 RX ADMIN — PANTOPRAZOLE SODIUM 40 MILLIGRAM(S): 20 TABLET, DELAYED RELEASE ORAL at 08:30

## 2020-07-27 RX ADMIN — ONDANSETRON 4 MILLIGRAM(S): 8 TABLET, FILM COATED ORAL at 16:57

## 2020-07-27 RX ADMIN — MIRTAZAPINE 15 MILLIGRAM(S): 45 TABLET, ORALLY DISINTEGRATING ORAL at 21:11

## 2020-07-27 RX ADMIN — Medication 1 TABLET(S): at 08:30

## 2020-07-27 RX ADMIN — Medication 1 PATCH: at 08:30

## 2020-07-27 RX ADMIN — GABAPENTIN 300 MILLIGRAM(S): 400 CAPSULE ORAL at 08:30

## 2020-07-27 RX ADMIN — SERTRALINE 50 MILLIGRAM(S): 25 TABLET, FILM COATED ORAL at 08:31

## 2020-07-27 RX ADMIN — GABAPENTIN 300 MILLIGRAM(S): 400 CAPSULE ORAL at 21:11

## 2020-07-27 RX ADMIN — GABAPENTIN 300 MILLIGRAM(S): 400 CAPSULE ORAL at 13:34

## 2020-07-28 PROCEDURE — 99232 SBSQ HOSP IP/OBS MODERATE 35: CPT | Mod: GC

## 2020-07-28 RX ORDER — GABAPENTIN 400 MG/1
300 CAPSULE ORAL THREE TIMES A DAY
Refills: 0 | Status: DISCONTINUED | OUTPATIENT
Start: 2020-07-28 | End: 2020-07-31

## 2020-07-28 RX ORDER — GABAPENTIN 400 MG/1
100 CAPSULE ORAL AT BEDTIME
Refills: 0 | Status: DISCONTINUED | OUTPATIENT
Start: 2020-07-28 | End: 2020-07-29

## 2020-07-28 RX ADMIN — Medication 3 MILLIGRAM(S): at 22:51

## 2020-07-28 RX ADMIN — PANTOPRAZOLE SODIUM 40 MILLIGRAM(S): 20 TABLET, DELAYED RELEASE ORAL at 09:45

## 2020-07-28 RX ADMIN — Medication 1 MILLIGRAM(S): at 09:44

## 2020-07-28 RX ADMIN — SERTRALINE 100 MILLIGRAM(S): 25 TABLET, FILM COATED ORAL at 09:45

## 2020-07-28 RX ADMIN — NALTREXONE HYDROCHLORIDE 50 MILLIGRAM(S): 50 TABLET, FILM COATED ORAL at 09:44

## 2020-07-28 RX ADMIN — MIRTAZAPINE 15 MILLIGRAM(S): 45 TABLET, ORALLY DISINTEGRATING ORAL at 20:52

## 2020-07-28 RX ADMIN — GABAPENTIN 300 MILLIGRAM(S): 400 CAPSULE ORAL at 09:44

## 2020-07-28 RX ADMIN — Medication 1 TABLET(S): at 09:44

## 2020-07-28 RX ADMIN — Medication 50 MILLIGRAM(S): at 16:11

## 2020-07-28 RX ADMIN — Medication 50 MILLIGRAM(S): at 06:31

## 2020-07-28 RX ADMIN — GABAPENTIN 300 MILLIGRAM(S): 400 CAPSULE ORAL at 20:52

## 2020-07-28 RX ADMIN — Medication 50 MILLIGRAM(S): at 22:51

## 2020-07-28 RX ADMIN — GABAPENTIN 300 MILLIGRAM(S): 400 CAPSULE ORAL at 13:28

## 2020-07-28 RX ADMIN — Medication 1 PATCH: at 09:44

## 2020-07-29 PROCEDURE — 99232 SBSQ HOSP IP/OBS MODERATE 35: CPT | Mod: GC

## 2020-07-29 RX ORDER — GABAPENTIN 400 MG/1
100 CAPSULE ORAL AT BEDTIME
Refills: 0 | Status: DISCONTINUED | OUTPATIENT
Start: 2020-07-29 | End: 2020-07-31

## 2020-07-29 RX ORDER — QUETIAPINE FUMARATE 200 MG/1
50 TABLET, FILM COATED ORAL AT BEDTIME
Refills: 0 | Status: DISCONTINUED | OUTPATIENT
Start: 2020-07-29 | End: 2020-07-30

## 2020-07-29 RX ADMIN — NALTREXONE HYDROCHLORIDE 50 MILLIGRAM(S): 50 TABLET, FILM COATED ORAL at 08:31

## 2020-07-29 RX ADMIN — Medication 50 MILLIGRAM(S): at 04:40

## 2020-07-29 RX ADMIN — SERTRALINE 100 MILLIGRAM(S): 25 TABLET, FILM COATED ORAL at 08:31

## 2020-07-29 RX ADMIN — GABAPENTIN 100 MILLIGRAM(S): 400 CAPSULE ORAL at 12:37

## 2020-07-29 RX ADMIN — GABAPENTIN 300 MILLIGRAM(S): 400 CAPSULE ORAL at 20:22

## 2020-07-29 RX ADMIN — Medication 1 TABLET(S): at 08:31

## 2020-07-29 RX ADMIN — Medication 1 MILLIGRAM(S): at 08:31

## 2020-07-29 RX ADMIN — QUETIAPINE FUMARATE 50 MILLIGRAM(S): 200 TABLET, FILM COATED ORAL at 20:22

## 2020-07-29 RX ADMIN — Medication 3 MILLIGRAM(S): at 22:25

## 2020-07-29 RX ADMIN — Medication 50 MILLIGRAM(S): at 22:25

## 2020-07-29 RX ADMIN — GABAPENTIN 300 MILLIGRAM(S): 400 CAPSULE ORAL at 08:31

## 2020-07-29 RX ADMIN — PANTOPRAZOLE SODIUM 40 MILLIGRAM(S): 20 TABLET, DELAYED RELEASE ORAL at 08:06

## 2020-07-29 RX ADMIN — GABAPENTIN 100 MILLIGRAM(S): 400 CAPSULE ORAL at 04:40

## 2020-07-29 RX ADMIN — Medication 1 PATCH: at 08:31

## 2020-07-29 RX ADMIN — GABAPENTIN 300 MILLIGRAM(S): 400 CAPSULE ORAL at 12:37

## 2020-07-30 PROCEDURE — 99232 SBSQ HOSP IP/OBS MODERATE 35: CPT | Mod: GC

## 2020-07-30 RX ORDER — HYDROXYZINE HCL 10 MG
1 TABLET ORAL
Qty: 60 | Refills: 0
Start: 2020-07-30 | End: 2020-08-13

## 2020-07-30 RX ORDER — QUETIAPINE FUMARATE 200 MG/1
100 TABLET, FILM COATED ORAL AT BEDTIME
Refills: 0 | Status: DISCONTINUED | OUTPATIENT
Start: 2020-07-30 | End: 2020-07-30

## 2020-07-30 RX ORDER — QUETIAPINE FUMARATE 200 MG/1
100 TABLET, FILM COATED ORAL AT BEDTIME
Refills: 0 | Status: DISCONTINUED | OUTPATIENT
Start: 2020-07-30 | End: 2020-07-31

## 2020-07-30 RX ORDER — GABAPENTIN 400 MG/1
1 CAPSULE ORAL
Qty: 90 | Refills: 0
Start: 2020-07-30 | End: 2020-08-28

## 2020-07-30 RX ORDER — QUETIAPINE FUMARATE 200 MG/1
1 TABLET, FILM COATED ORAL
Qty: 30 | Refills: 0
Start: 2020-07-30 | End: 2020-08-28

## 2020-07-30 RX ORDER — SERTRALINE 25 MG/1
1 TABLET, FILM COATED ORAL
Qty: 30 | Refills: 0
Start: 2020-07-30 | End: 2020-08-28

## 2020-07-30 RX ORDER — NALTREXONE HYDROCHLORIDE 50 MG/1
1 TABLET, FILM COATED ORAL
Qty: 30 | Refills: 0
Start: 2020-07-30 | End: 2020-08-28

## 2020-07-30 RX ADMIN — Medication 1 PATCH: at 08:46

## 2020-07-30 RX ADMIN — NALTREXONE HYDROCHLORIDE 50 MILLIGRAM(S): 50 TABLET, FILM COATED ORAL at 08:46

## 2020-07-30 RX ADMIN — Medication 50 MILLIGRAM(S): at 05:15

## 2020-07-30 RX ADMIN — SERTRALINE 100 MILLIGRAM(S): 25 TABLET, FILM COATED ORAL at 08:46

## 2020-07-30 RX ADMIN — Medication 1 TABLET(S): at 08:46

## 2020-07-30 RX ADMIN — Medication 50 MILLIGRAM(S): at 12:27

## 2020-07-30 RX ADMIN — GABAPENTIN 300 MILLIGRAM(S): 400 CAPSULE ORAL at 13:16

## 2020-07-30 RX ADMIN — PANTOPRAZOLE SODIUM 40 MILLIGRAM(S): 20 TABLET, DELAYED RELEASE ORAL at 06:52

## 2020-07-30 RX ADMIN — QUETIAPINE FUMARATE 100 MILLIGRAM(S): 200 TABLET, FILM COATED ORAL at 20:27

## 2020-07-30 RX ADMIN — Medication 1 MILLIGRAM(S): at 08:47

## 2020-07-30 RX ADMIN — GABAPENTIN 300 MILLIGRAM(S): 400 CAPSULE ORAL at 08:46

## 2020-07-30 RX ADMIN — GABAPENTIN 300 MILLIGRAM(S): 400 CAPSULE ORAL at 20:27

## 2020-07-31 VITALS — TEMPERATURE: 98 F

## 2020-07-31 PROCEDURE — 99238 HOSP IP/OBS DSCHRG MGMT 30/<: CPT | Mod: GC

## 2020-07-31 RX ADMIN — GABAPENTIN 300 MILLIGRAM(S): 400 CAPSULE ORAL at 09:02

## 2020-07-31 RX ADMIN — Medication 1 PATCH: at 09:02

## 2020-07-31 RX ADMIN — PANTOPRAZOLE SODIUM 40 MILLIGRAM(S): 20 TABLET, DELAYED RELEASE ORAL at 06:23

## 2020-07-31 RX ADMIN — Medication 1 TABLET(S): at 09:02

## 2020-07-31 RX ADMIN — GABAPENTIN 300 MILLIGRAM(S): 400 CAPSULE ORAL at 12:30

## 2020-07-31 RX ADMIN — NALTREXONE HYDROCHLORIDE 50 MILLIGRAM(S): 50 TABLET, FILM COATED ORAL at 09:02

## 2020-07-31 RX ADMIN — SERTRALINE 100 MILLIGRAM(S): 25 TABLET, FILM COATED ORAL at 09:03

## 2020-07-31 RX ADMIN — Medication 50 MILLIGRAM(S): at 12:48

## 2020-07-31 RX ADMIN — Medication 50 MILLIGRAM(S): at 06:02

## 2020-07-31 RX ADMIN — Medication 1 MILLIGRAM(S): at 09:02

## 2020-08-25 ENCOUNTER — INPATIENT (INPATIENT)
Facility: HOSPITAL | Age: 35
LOS: 6 days | Discharge: PSYCHIATRIC OR SUBSTANCE USE FACILITY | End: 2020-09-01
Attending: HOSPITALIST | Admitting: HOSPITALIST
Payer: MEDICAID

## 2020-08-25 VITALS
RESPIRATION RATE: 18 BRPM | OXYGEN SATURATION: 99 % | SYSTOLIC BLOOD PRESSURE: 232 MMHG | HEART RATE: 89 BPM | TEMPERATURE: 99 F | DIASTOLIC BLOOD PRESSURE: 203 MMHG

## 2020-08-25 DIAGNOSIS — R10.84 GENERALIZED ABDOMINAL PAIN: ICD-10-CM

## 2020-08-25 DIAGNOSIS — F10.239 ALCOHOL DEPENDENCE WITH WITHDRAWAL, UNSPECIFIED: ICD-10-CM

## 2020-08-25 DIAGNOSIS — Z29.9 ENCOUNTER FOR PROPHYLACTIC MEASURES, UNSPECIFIED: ICD-10-CM

## 2020-08-25 DIAGNOSIS — F19.10 OTHER PSYCHOACTIVE SUBSTANCE ABUSE, UNCOMPLICATED: ICD-10-CM

## 2020-08-25 DIAGNOSIS — F10.230 ALCOHOL DEPENDENCE WITH WITHDRAWAL, UNCOMPLICATED: ICD-10-CM

## 2020-08-25 DIAGNOSIS — R45.851 SUICIDAL IDEATIONS: ICD-10-CM

## 2020-08-25 LAB
ALBUMIN SERPL ELPH-MCNC: 4.5 G/DL — SIGNIFICANT CHANGE UP (ref 3.3–5)
ALP SERPL-CCNC: 72 U/L — SIGNIFICANT CHANGE UP (ref 40–120)
ALT FLD-CCNC: 27 U/L — SIGNIFICANT CHANGE UP (ref 4–41)
ANION GAP SERPL CALC-SCNC: 15 MMO/L — HIGH (ref 7–14)
APAP SERPL-MCNC: < 15 UG/ML — LOW (ref 15–25)
AST SERPL-CCNC: 45 U/L — HIGH (ref 4–40)
BASE EXCESS BLDV CALC-SCNC: 7.3 MMOL/L — SIGNIFICANT CHANGE UP
BASOPHILS # BLD AUTO: 0.06 K/UL — SIGNIFICANT CHANGE UP (ref 0–0.2)
BASOPHILS NFR BLD AUTO: 1.2 % — SIGNIFICANT CHANGE UP (ref 0–2)
BILIRUB SERPL-MCNC: 0.2 MG/DL — SIGNIFICANT CHANGE UP (ref 0.2–1.2)
BLOOD GAS VENOUS - CREATININE: 0.7 MG/DL — SIGNIFICANT CHANGE UP (ref 0.5–1.3)
BLOOD GAS VENOUS - FIO2: 20 — SIGNIFICANT CHANGE UP
BUN SERPL-MCNC: 12 MG/DL — SIGNIFICANT CHANGE UP (ref 7–23)
CALCIUM SERPL-MCNC: 8.7 MG/DL — SIGNIFICANT CHANGE UP (ref 8.4–10.5)
CHLORIDE BLDV-SCNC: 105 MMOL/L — SIGNIFICANT CHANGE UP (ref 96–108)
CHLORIDE SERPL-SCNC: 99 MMOL/L — SIGNIFICANT CHANGE UP (ref 98–107)
CO2 SERPL-SCNC: 26 MMOL/L — SIGNIFICANT CHANGE UP (ref 22–31)
CREAT SERPL-MCNC: 0.63 MG/DL — SIGNIFICANT CHANGE UP (ref 0.5–1.3)
EOSINOPHIL # BLD AUTO: 0.09 K/UL — SIGNIFICANT CHANGE UP (ref 0–0.5)
EOSINOPHIL NFR BLD AUTO: 1.8 % — SIGNIFICANT CHANGE UP (ref 0–6)
ETHANOL BLD-MCNC: 251 MG/DL — HIGH
GAS PNL BLDV: 138 MMOL/L — SIGNIFICANT CHANGE UP (ref 136–146)
GLUCOSE BLDV-MCNC: 110 MG/DL — HIGH (ref 70–99)
GLUCOSE SERPL-MCNC: 110 MG/DL — HIGH (ref 70–99)
HCO3 BLDV-SCNC: 29 MMOL/L — HIGH (ref 20–27)
HCT VFR BLD CALC: 43.8 % — SIGNIFICANT CHANGE UP (ref 39–50)
HCT VFR BLDV CALC: 48.5 % — SIGNIFICANT CHANGE UP (ref 39–51)
HGB BLD-MCNC: 15.2 G/DL — SIGNIFICANT CHANGE UP (ref 13–17)
HGB BLDV-MCNC: 15.8 G/DL — SIGNIFICANT CHANGE UP (ref 13–17)
IMM GRANULOCYTES NFR BLD AUTO: 0.6 % — SIGNIFICANT CHANGE UP (ref 0–1.5)
LACTATE BLDV-MCNC: 2.2 MMOL/L — HIGH (ref 0.5–2)
LYMPHOCYTES # BLD AUTO: 1.42 K/UL — SIGNIFICANT CHANGE UP (ref 1–3.3)
LYMPHOCYTES # BLD AUTO: 27.7 % — SIGNIFICANT CHANGE UP (ref 13–44)
MAGNESIUM SERPL-MCNC: 2 MG/DL — SIGNIFICANT CHANGE UP (ref 1.6–2.6)
MCHC RBC-ENTMCNC: 33.5 PG — SIGNIFICANT CHANGE UP (ref 27–34)
MCHC RBC-ENTMCNC: 34.7 % — SIGNIFICANT CHANGE UP (ref 32–36)
MCV RBC AUTO: 96.5 FL — SIGNIFICANT CHANGE UP (ref 80–100)
MONOCYTES # BLD AUTO: 0.84 K/UL — SIGNIFICANT CHANGE UP (ref 0–0.9)
MONOCYTES NFR BLD AUTO: 16.4 % — HIGH (ref 2–14)
NEUTROPHILS # BLD AUTO: 2.69 K/UL — SIGNIFICANT CHANGE UP (ref 1.8–7.4)
NEUTROPHILS NFR BLD AUTO: 52.3 % — SIGNIFICANT CHANGE UP (ref 43–77)
NRBC # FLD: 0 K/UL — SIGNIFICANT CHANGE UP (ref 0–0)
PCO2 BLDV: 56 MMHG — HIGH (ref 41–51)
PH BLDV: 7.38 PH — SIGNIFICANT CHANGE UP (ref 7.32–7.43)
PHOSPHATE SERPL-MCNC: 1.9 MG/DL — LOW (ref 2.5–4.5)
PLATELET # BLD AUTO: 128 K/UL — LOW (ref 150–400)
PMV BLD: 8.3 FL — SIGNIFICANT CHANGE UP (ref 7–13)
PO2 BLDV: 37 MMHG — SIGNIFICANT CHANGE UP (ref 35–40)
POTASSIUM BLDV-SCNC: 4.1 MMOL/L — SIGNIFICANT CHANGE UP (ref 3.4–4.5)
POTASSIUM SERPL-MCNC: 4.1 MMOL/L — SIGNIFICANT CHANGE UP (ref 3.5–5.3)
POTASSIUM SERPL-SCNC: 4.1 MMOL/L — SIGNIFICANT CHANGE UP (ref 3.5–5.3)
PROT SERPL-MCNC: 6.8 G/DL — SIGNIFICANT CHANGE UP (ref 6–8.3)
RBC # BLD: 4.54 M/UL — SIGNIFICANT CHANGE UP (ref 4.2–5.8)
RBC # FLD: 13.8 % — SIGNIFICANT CHANGE UP (ref 10.3–14.5)
SALICYLATES SERPL-MCNC: < 5 MG/DL — LOW (ref 15–30)
SAO2 % BLDV: 65.9 % — SIGNIFICANT CHANGE UP (ref 60–85)
SODIUM SERPL-SCNC: 140 MMOL/L — SIGNIFICANT CHANGE UP (ref 135–145)
TROPONIN T, HIGH SENSITIVITY: 8 NG/L — SIGNIFICANT CHANGE UP (ref ?–14)
TSH SERPL-MCNC: 0.67 UIU/ML — SIGNIFICANT CHANGE UP (ref 0.27–4.2)
WBC # BLD: 5.13 K/UL — SIGNIFICANT CHANGE UP (ref 3.8–10.5)
WBC # FLD AUTO: 5.13 K/UL — SIGNIFICANT CHANGE UP (ref 3.8–10.5)

## 2020-08-25 PROCEDURE — 99284 EMERGENCY DEPT VISIT MOD MDM: CPT

## 2020-08-25 PROCEDURE — 99223 1ST HOSP IP/OBS HIGH 75: CPT

## 2020-08-25 PROCEDURE — 93010 ELECTROCARDIOGRAM REPORT: CPT

## 2020-08-25 PROCEDURE — 71045 X-RAY EXAM CHEST 1 VIEW: CPT | Mod: 26

## 2020-08-25 RX ORDER — POTASSIUM PHOSPHATE, MONOBASIC POTASSIUM PHOSPHATE, DIBASIC 236; 224 MG/ML; MG/ML
15 INJECTION, SOLUTION INTRAVENOUS ONCE
Refills: 0 | Status: COMPLETED | OUTPATIENT
Start: 2020-08-25 | End: 2020-08-25

## 2020-08-25 RX ORDER — THIAMINE MONONITRATE (VIT B1) 100 MG
100 TABLET ORAL ONCE
Refills: 0 | Status: COMPLETED | OUTPATIENT
Start: 2020-08-25 | End: 2020-08-25

## 2020-08-25 RX ORDER — SODIUM CHLORIDE 9 MG/ML
1000 INJECTION INTRAMUSCULAR; INTRAVENOUS; SUBCUTANEOUS
Refills: 0 | Status: DISCONTINUED | OUTPATIENT
Start: 2020-08-25 | End: 2020-08-26

## 2020-08-25 RX ORDER — SODIUM CHLORIDE 9 MG/ML
1000 INJECTION INTRAMUSCULAR; INTRAVENOUS; SUBCUTANEOUS ONCE
Refills: 0 | Status: COMPLETED | OUTPATIENT
Start: 2020-08-25 | End: 2020-08-25

## 2020-08-25 RX ORDER — FOLIC ACID 0.8 MG
1 TABLET ORAL DAILY
Refills: 0 | Status: DISCONTINUED | OUTPATIENT
Start: 2020-08-25 | End: 2020-09-01

## 2020-08-25 RX ORDER — THIAMINE MONONITRATE (VIT B1) 100 MG
100 TABLET ORAL DAILY
Refills: 0 | Status: DISCONTINUED | OUTPATIENT
Start: 2020-08-25 | End: 2020-08-27

## 2020-08-25 RX ORDER — ENOXAPARIN SODIUM 100 MG/ML
40 INJECTION SUBCUTANEOUS DAILY
Refills: 0 | Status: DISCONTINUED | OUTPATIENT
Start: 2020-08-25 | End: 2020-09-01

## 2020-08-25 RX ORDER — HYDROXYZINE HCL 10 MG
50 TABLET ORAL EVERY 6 HOURS
Refills: 0 | Status: DISCONTINUED | OUTPATIENT
Start: 2020-08-25 | End: 2020-09-01

## 2020-08-25 RX ORDER — GABAPENTIN 400 MG/1
300 CAPSULE ORAL THREE TIMES A DAY
Refills: 0 | Status: DISCONTINUED | OUTPATIENT
Start: 2020-08-25 | End: 2020-09-01

## 2020-08-25 RX ORDER — QUETIAPINE FUMARATE 200 MG/1
100 TABLET, FILM COATED ORAL AT BEDTIME
Refills: 0 | Status: DISCONTINUED | OUTPATIENT
Start: 2020-08-25 | End: 2020-09-01

## 2020-08-25 RX ORDER — SERTRALINE 25 MG/1
100 TABLET, FILM COATED ORAL DAILY
Refills: 0 | Status: DISCONTINUED | OUTPATIENT
Start: 2020-08-26 | End: 2020-09-01

## 2020-08-25 RX ADMIN — SODIUM CHLORIDE 1000 MILLILITER(S): 9 INJECTION INTRAMUSCULAR; INTRAVENOUS; SUBCUTANEOUS at 12:09

## 2020-08-25 RX ADMIN — Medication 50 MILLIGRAM(S): at 21:37

## 2020-08-25 RX ADMIN — Medication 100 MILLIGRAM(S): at 19:26

## 2020-08-25 RX ADMIN — Medication 2 MILLIGRAM(S): at 12:09

## 2020-08-25 RX ADMIN — GABAPENTIN 300 MILLIGRAM(S): 400 CAPSULE ORAL at 21:37

## 2020-08-25 RX ADMIN — POTASSIUM PHOSPHATE, MONOBASIC POTASSIUM PHOSPHATE, DIBASIC 62.5 MILLIMOLE(S): 236; 224 INJECTION, SOLUTION INTRAVENOUS at 21:37

## 2020-08-25 RX ADMIN — Medication 1 MILLIGRAM(S): at 19:26

## 2020-08-25 RX ADMIN — SODIUM CHLORIDE 100 MILLILITER(S): 9 INJECTION INTRAMUSCULAR; INTRAVENOUS; SUBCUTANEOUS at 21:38

## 2020-08-25 RX ADMIN — QUETIAPINE FUMARATE 100 MILLIGRAM(S): 200 TABLET, FILM COATED ORAL at 21:37

## 2020-08-25 NOTE — H&P ADULT - NSHPREVIEWOFSYSTEMS_GEN_ALL_CORE
REVIEW OF SYSTEMS:    CONSTITUTIONAL: No weakness, fevers or chills  EYES/ENT: No visual changes;  No dysphagia  NECK: No pain or stiffness  RESPIRATORY: No cough, wheezing, hemoptysis; No shortness of breath  CARDIOVASCULAR: No chest pain or palpitations; No lower extremity edema  GASTROINTESTINAL: +vague abdominal pain. No nausea, vomiting, or hematemesis; No diarrhea or constipation. No melena or hematochezia.  GENITOURINARY: No dysuria, frequency or hematuria  NEUROLOGICAL: +tremors and shakiiness. No numbness or weakness  SKIN: No itching, burning, rashes, or lesions

## 2020-08-25 NOTE — SBIRT NOTE ADULT - NSSBIRTUNABLESCROTHER_GEN_A_CORE
SBIRT unable to see pt at this moment due to pt being sedated after medication. Will come back to complete screening. SBIRT will see pt after  psychiatrically cleared. Spectra [id number] Chart Review. As per  Note pt may require [facility] admission once medically stable. Pt with recent psych admission (July 2020).

## 2020-08-25 NOTE — ED PROVIDER NOTE - PHYSICAL EXAMINATION
Attending/Susan: No signs of trauma, PERRL, supple, RRR, CTAB, Abd-soft, NT/ND, no LE edema, +2 DP/PT; Responsive to noxious stimuli only, moving all 4 ext; Skin-warm/dry

## 2020-08-25 NOTE — ED ADULT TRIAGE NOTE - CHIEF COMPLAINT QUOTE
pt c/o "I need my psych meds"  pt ran out of zoloft, seroquel.  pt also states "im an alcoholic".  pt has visible tremors.  last drink was this morning.  pt also states I want to kill myself and slice my wrists open.  pt is diaphoretic

## 2020-08-25 NOTE — H&P ADULT - PROBLEM SELECTOR PLAN 2
vague pain, but unremarkable abd exam and labs unremarkable  -will add on lipase. continue IVF for now  -possibly gastritis from alcohol. can start PPI if no improvement

## 2020-08-25 NOTE — H&P ADULT - ASSESSMENT
35 y/o male with hx of cocaine use and alcohol abuse who presents to the ED as a walk in accompanied by his aunt for suicidal ideation and alcohol intoxication. To be admitted for alcohol withdrawal

## 2020-08-25 NOTE — ED ADULT NURSE NOTE - OBJECTIVE STATEMENT
Receive pt. in ER room 10 alert and oriented x 3, presenting to the ER with complaints of needing psych medications and wanting to hurt himself. Pt. have a history of ETOH abuse, drug abuse, Depression. Pt. stated " I need my medications my chest is hurting'. Pt. appear flush in appearance, has tremors in upper extremities and perspiration on his forehead. Stated " I feel like killing myself". Medicated as ordered, constant observation in place, Placed on cardiac monitor, will continue to monitor.

## 2020-08-25 NOTE — H&P ADULT - NSHPLABSRESULTS_GEN_ALL_CORE
08-25    140  |  99  |  12  ----------------------------<  110<H>  4.1   |  26  |  0.63    Ca    8.7      25 Aug 2020 12:15  Phos  1.9     08-25  Mg     2.0     08-25    TPro  6.8  /  Alb  4.5  /  TBili  0.2  /  DBili  x   /  AST  45<H>  /  ALT  27  /  AlkPhos  72  08-25                            15.2   5.13  )-----------( 128      ( 25 Aug 2020 12:15 )             43.8             LIVER FUNCTIONS - ( 25 Aug 2020 12:15 )  Alb: 4.5 g/dL / Pro: 6.8 g/dL / ALK PHOS: 72 u/L / ALT: 27 u/L / AST: 45 u/L / GGT: x             EKG: normal sinus rhythm with sinus arrythmia    CXR: IMPRESSION:  Peripheral lower right hemithorax incompletely imaged.    Underinflated lungs with resultant slight crowding of lung markings but otherwise grossly clear. Sharp left CP angle. No pneumothorax.    Heart size and mediastinal width inaccurately assessed on the projection.    Trachea midline.    Unremarkable osseous structures.

## 2020-08-25 NOTE — H&P ADULT - PROBLEM SELECTOR PLAN 3
none currently, no other hallucinations. Restart home psych meds per psych  -gabapentin, seroquel, hydroxyzine, sertraline  -maintain 1:1
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
## 2020-08-25 NOTE — H&P ADULT - HISTORY OF PRESENT ILLNESS
Pt is a 33 y/o male with hx of cocaine use and alcohol abuse who presents to the ED as a walk in accompanied by his aunt for suicidal ideation and alcohol intoxication. Pt reportedly was going to kill himself or harm others per his aunt. Since his discharge from [facility] he has not followed up and has been drinking excessively still daily. In the ED his CIWA score was 13 and he was intoxicated on arrival with elevated etoh level. He was given ativan and normal saline with thiamine. At bedside still feels tremulous and complaining of vague abdominal pain but no nausea, vomiting nor diarrhea. No other fever, chills, chest pain, cough or shortness of breath. Currently denying hallucinations nor suicidal ideation. Reportedly had has ICU admission in the past. Last drink this AM about 5 beers

## 2020-08-25 NOTE — ED BEHAVIORAL HEALTH NOTE - BEHAVIORAL HEALTH NOTE
Patient notably a bit sedated and uncomfortable on exam, states "I have the shakes" and visibly tremulous with a history of medical hospitalization in July that was required for management of alcohol withdrawal prior to [address].  Pt is unable to fully partake in  assessment at this time and will require medical hospitalization at this time to manage acute ETOH withdrawal.    Vital Signs Last 24 Hrs  T(C): 36.3 (25 Aug 2020 17:00), Max: 37 (25 Aug 2020 11:30)  T(F): 97.3 (25 Aug 2020 17:00), Max: 98.6 (25 Aug 2020 11:30)  HR: 75 (25 Aug 2020 17:00) (66 - 89)  BP: 114/58 (25 Aug 2020 17:00) (112/60 - 232/203)  BP(mean): --  RR: 18 (25 Aug 2020 17:00) (15 - 18)  SpO2: 100% (25 Aug 2020 17:00) (98% - 100%)    In summary, this is a 35 y/o male, single, non-domiciled, well supported by friends, unemployed, past psychiatric history of substance induced mood disorder, one past inpatient psych admission at [facility] from 7/22-7/31/2020, current medical history of alcohol, cocaine abuse disorder. Received court mandated rehab in 2012 for cocaine arrest.  No aggression or violence in the ED, currently on 1:1, calm and cooperative.  Psychiatry consulted for SI.  Patient with prominent tremors noted on exam. Patient currently endorsing SI with plan to kill himself outside of the hospital (although denies plan to harm himself while here), +depressive sx in the setting of ongoing EtOH and cocaine use, no psychotic symptoms.  Patient was discharged on meds of Seroquel 100mg HS, Zoloft 100mg daily, Naltrexone 50mg daily, Neurontin 300mg TID, Atarax 50mg q 6hr prn anxiety but not taking since discharge.    Diagnosis of Substance induced depressive disorder, r/o MDD.   Pt also with Alcohol withdrawal and alcohol abuse and cocaine abuse disorder.     Recommendations:   -WA protocol and recommend taper  -c/w 1:1 for danger to self   -may require [facility] admission once medically stable  -Case signed out to  Psych who will follow will full consult to be completed when able.

## 2020-08-25 NOTE — ED ADULT NURSE NOTE - NSIMPLEMENTINTERV_GEN_ALL_ED
Implemented All Fall with Harm Risk Interventions:  [place] to call system. Call bell, personal items and telephone within reach. Instruct patient to call for assistance. Room bathroom lighting operational. Non-slip footwear when patient is off stretcher. Physically safe environment: no spills, clutter or unnecessary equipment. Stretcher in lowest position, wheels locked, appropriate side rails in place. Provide visual cue, wrist band, yellow gown, etc. Monitor gait and stability. Monitor for mental status changes and reorient to person, place, and time. Review medications for side effects contributing to fall risk. Reinforce activity limits and safety measures with patient and family. Provide visual clues: red socks.

## 2020-08-25 NOTE — ED PROVIDER NOTE - CARE PLAN
Principal Discharge DX:	EtOH dependence Principal Discharge DX:	Alcohol withdrawal  Secondary Diagnosis:	EtOH dependence

## 2020-08-25 NOTE — H&P ADULT - PROBLEM SELECTOR PLAN 1
Pt presenting intoxicated now tremulous, initial ciwa of 13.   -start on librium taper with prn ativan IV for symptom  -continue MVI, thiamine folic acid  -continue ivf. psych following

## 2020-08-25 NOTE — ED PROVIDER NOTE - OBJECTIVE STATEMENT
Attending/Susan: 33 yo M h/o alcohol (ICU admission but no reports of withdrawal seizures, ETT) and cocaine use disorder, reportedly ambulatory walk-in requesting pysch medications and tremulous. Pt was given Ativan prior to me seeing patient for tremors/concerns for alcohol withdrawal symptoms. Pt reportedly had last drink this morning (5 beers).

## 2020-08-25 NOTE — H&P ADULT - NSHPPHYSICALEXAM_GEN_ALL_CORE
PHYSICAL EXAM:  GENERAL: tremulous appearing, well-developed, well-nourished  HEAD:  Atraumatic, Normocephalic  EYES: EOMI, PERRLA, conjunctiva and sclera clear  NECK: Supple, No JVD  CHEST/LUNG: Clear to auscultation bilaterally; No wheezes, rales or rhonchi  HEART: Regular rate and rhythm; No murmurs, rubs, or gallops, (+)S1, S2  ABDOMEN: Soft, minimally tender to palpation in epigastrum, Nondistended; Normal Bowel sounds. No rebound nor guarding   EXTREMITIES:  2+ Peripheral Pulses, No clubbing, cyanosis, or edema  PSYCH: flat affect, denies SI, denies auditory/visual hallucinations  NEUROLOGY: AAOx3 (not sure which hospital), moving all extremities, +UE tremors b/l  SKIN: No rashes or lesions

## 2020-08-26 LAB
ALBUMIN SERPL ELPH-MCNC: 3.6 G/DL — SIGNIFICANT CHANGE UP (ref 3.3–5)
ALP SERPL-CCNC: 65 U/L — SIGNIFICANT CHANGE UP (ref 40–120)
ALT FLD-CCNC: 21 U/L — SIGNIFICANT CHANGE UP (ref 4–41)
ANION GAP SERPL CALC-SCNC: 14 MMO/L — SIGNIFICANT CHANGE UP (ref 7–14)
ANISOCYTOSIS BLD QL: SLIGHT — SIGNIFICANT CHANGE UP
AST SERPL-CCNC: 32 U/L — SIGNIFICANT CHANGE UP (ref 4–40)
BASOPHILS # BLD AUTO: 0.03 K/UL — SIGNIFICANT CHANGE UP (ref 0–0.2)
BASOPHILS NFR BLD AUTO: 0.9 % — SIGNIFICANT CHANGE UP (ref 0–2)
BASOPHILS NFR SPEC: 1.8 % — SIGNIFICANT CHANGE UP (ref 0–2)
BILIRUB DIRECT SERPL-MCNC: < 0.2 MG/DL — SIGNIFICANT CHANGE UP (ref 0.1–0.2)
BILIRUB SERPL-MCNC: 0.5 MG/DL — SIGNIFICANT CHANGE UP (ref 0.2–1.2)
BLASTS # FLD: 0 % — SIGNIFICANT CHANGE UP (ref 0–0)
BUN SERPL-MCNC: 12 MG/DL — SIGNIFICANT CHANGE UP (ref 7–23)
CALCIUM SERPL-MCNC: 8.1 MG/DL — LOW (ref 8.4–10.5)
CHLORIDE SERPL-SCNC: 104 MMOL/L — SIGNIFICANT CHANGE UP (ref 98–107)
CO2 SERPL-SCNC: 22 MMOL/L — SIGNIFICANT CHANGE UP (ref 22–31)
CREAT SERPL-MCNC: 0.52 MG/DL — SIGNIFICANT CHANGE UP (ref 0.5–1.3)
EOSINOPHIL # BLD AUTO: 0.12 K/UL — SIGNIFICANT CHANGE UP (ref 0–0.5)
EOSINOPHIL NFR BLD AUTO: 3.5 % — SIGNIFICANT CHANGE UP (ref 0–6)
EOSINOPHIL NFR FLD: 1.8 % — SIGNIFICANT CHANGE UP (ref 0–6)
GIANT PLATELETS BLD QL SMEAR: PRESENT — SIGNIFICANT CHANGE UP
GLUCOSE SERPL-MCNC: 77 MG/DL — SIGNIFICANT CHANGE UP (ref 70–99)
HCT VFR BLD CALC: 39.8 % — SIGNIFICANT CHANGE UP (ref 39–50)
HGB BLD-MCNC: 13.9 G/DL — SIGNIFICANT CHANGE UP (ref 13–17)
IMM GRANULOCYTES NFR BLD AUTO: 0.3 % — SIGNIFICANT CHANGE UP (ref 0–1.5)
INR BLD: 1.1 — SIGNIFICANT CHANGE UP (ref 0.88–1.16)
LYMPHOCYTES # BLD AUTO: 1.22 K/UL — SIGNIFICANT CHANGE UP (ref 1–3.3)
LYMPHOCYTES # BLD AUTO: 35.7 % — SIGNIFICANT CHANGE UP (ref 13–44)
LYMPHOCYTES NFR SPEC AUTO: 29.7 % — SIGNIFICANT CHANGE UP (ref 13–44)
MACROCYTES BLD QL: SLIGHT — SIGNIFICANT CHANGE UP
MAGNESIUM SERPL-MCNC: 1.9 MG/DL — SIGNIFICANT CHANGE UP (ref 1.6–2.6)
MCHC RBC-ENTMCNC: 34.2 PG — HIGH (ref 27–34)
MCHC RBC-ENTMCNC: 34.9 % — SIGNIFICANT CHANGE UP (ref 32–36)
MCV RBC AUTO: 97.8 FL — SIGNIFICANT CHANGE UP (ref 80–100)
METAMYELOCYTES # FLD: 0 % — SIGNIFICANT CHANGE UP (ref 0–1)
MONOCYTES # BLD AUTO: 0.41 K/UL — SIGNIFICANT CHANGE UP (ref 0–0.9)
MONOCYTES NFR BLD AUTO: 12 % — SIGNIFICANT CHANGE UP (ref 2–14)
MONOCYTES NFR BLD: 9 % — SIGNIFICANT CHANGE UP (ref 2–9)
MYELOCYTES NFR BLD: 0 % — SIGNIFICANT CHANGE UP (ref 0–0)
NEUTROPHIL AB SER-ACNC: 50.5 % — SIGNIFICANT CHANGE UP (ref 43–77)
NEUTROPHILS # BLD AUTO: 1.63 K/UL — LOW (ref 1.8–7.4)
NEUTROPHILS NFR BLD AUTO: 47.6 % — SIGNIFICANT CHANGE UP (ref 43–77)
NEUTS BAND # BLD: 0 % — SIGNIFICANT CHANGE UP (ref 0–6)
NRBC # FLD: 0 K/UL — SIGNIFICANT CHANGE UP (ref 0–0)
OTHER - HEMATOLOGY %: 0.9 — SIGNIFICANT CHANGE UP
PHOSPHATE SERPL-MCNC: 2.4 MG/DL — LOW (ref 2.5–4.5)
PLATELET # BLD AUTO: 107 K/UL — LOW (ref 150–400)
PLATELET COUNT - ESTIMATE: SIGNIFICANT CHANGE UP
PMV BLD: 8.6 FL — SIGNIFICANT CHANGE UP (ref 7–13)
POIKILOCYTOSIS BLD QL AUTO: SLIGHT — SIGNIFICANT CHANGE UP
POLYCHROMASIA BLD QL SMEAR: SLIGHT — SIGNIFICANT CHANGE UP
POTASSIUM SERPL-MCNC: 3.8 MMOL/L — SIGNIFICANT CHANGE UP (ref 3.5–5.3)
POTASSIUM SERPL-SCNC: 3.8 MMOL/L — SIGNIFICANT CHANGE UP (ref 3.5–5.3)
PROMYELOCYTES # FLD: 0 % — SIGNIFICANT CHANGE UP (ref 0–0)
PROT SERPL-MCNC: 5.5 G/DL — LOW (ref 6–8.3)
PROTHROM AB SERPL-ACNC: 12.5 SEC — SIGNIFICANT CHANGE UP (ref 10.6–13.6)
RBC # BLD: 4.07 M/UL — LOW (ref 4.2–5.8)
RBC # FLD: 13.7 % — SIGNIFICANT CHANGE UP (ref 10.3–14.5)
SARS-COV-2 RNA SPEC QL NAA+PROBE: SIGNIFICANT CHANGE UP
SMUDGE CELLS # BLD: PRESENT — SIGNIFICANT CHANGE UP
SODIUM SERPL-SCNC: 140 MMOL/L — SIGNIFICANT CHANGE UP (ref 135–145)
TARGETS BLD QL SMEAR: SLIGHT — SIGNIFICANT CHANGE UP
TROPONIN T, HIGH SENSITIVITY: < 6 NG/L — SIGNIFICANT CHANGE UP (ref ?–14)
VARIANT LYMPHS # BLD: 5.4 % — SIGNIFICANT CHANGE UP
WBC # BLD: 3.42 K/UL — LOW (ref 3.8–10.5)
WBC # FLD AUTO: 3.42 K/UL — LOW (ref 3.8–10.5)

## 2020-08-26 PROCEDURE — 99232 SBSQ HOSP IP/OBS MODERATE 35: CPT

## 2020-08-26 PROCEDURE — 99223 1ST HOSP IP/OBS HIGH 75: CPT

## 2020-08-26 PROCEDURE — 85060 BLOOD SMEAR INTERPRETATION: CPT

## 2020-08-26 RX ORDER — SODIUM,POTASSIUM PHOSPHATES 278-250MG
1 POWDER IN PACKET (EA) ORAL ONCE
Refills: 0 | Status: COMPLETED | OUTPATIENT
Start: 2020-08-26 | End: 2020-08-26

## 2020-08-26 RX ORDER — ONDANSETRON 8 MG/1
4 TABLET, FILM COATED ORAL EVERY 4 HOURS
Refills: 0 | Status: DISCONTINUED | OUTPATIENT
Start: 2020-08-26 | End: 2020-09-01

## 2020-08-26 RX ORDER — FAMOTIDINE 10 MG/ML
20 INJECTION INTRAVENOUS ONCE
Refills: 0 | Status: COMPLETED | OUTPATIENT
Start: 2020-08-26 | End: 2020-08-26

## 2020-08-26 RX ADMIN — Medication 100 MILLIGRAM(S): at 12:20

## 2020-08-26 RX ADMIN — Medication 50 MILLIGRAM(S): at 13:55

## 2020-08-26 RX ADMIN — GABAPENTIN 300 MILLIGRAM(S): 400 CAPSULE ORAL at 22:12

## 2020-08-26 RX ADMIN — SERTRALINE 100 MILLIGRAM(S): 25 TABLET, FILM COATED ORAL at 12:21

## 2020-08-26 RX ADMIN — SODIUM CHLORIDE 100 MILLILITER(S): 9 INJECTION INTRAMUSCULAR; INTRAVENOUS; SUBCUTANEOUS at 06:44

## 2020-08-26 RX ADMIN — Medication 50 MILLIGRAM(S): at 10:08

## 2020-08-26 RX ADMIN — ENOXAPARIN SODIUM 40 MILLIGRAM(S): 100 INJECTION SUBCUTANEOUS at 12:20

## 2020-08-26 RX ADMIN — GABAPENTIN 300 MILLIGRAM(S): 400 CAPSULE ORAL at 06:44

## 2020-08-26 RX ADMIN — GABAPENTIN 300 MILLIGRAM(S): 400 CAPSULE ORAL at 13:55

## 2020-08-26 RX ADMIN — Medication 1 TABLET(S): at 18:19

## 2020-08-26 RX ADMIN — Medication 1 TABLET(S): at 12:21

## 2020-08-26 RX ADMIN — Medication 50 MILLIGRAM(S): at 02:31

## 2020-08-26 RX ADMIN — ONDANSETRON 4 MILLIGRAM(S): 8 TABLET, FILM COATED ORAL at 18:18

## 2020-08-26 RX ADMIN — Medication 1 MILLIGRAM(S): at 12:21

## 2020-08-26 RX ADMIN — QUETIAPINE FUMARATE 100 MILLIGRAM(S): 200 TABLET, FILM COATED ORAL at 23:22

## 2020-08-26 RX ADMIN — FAMOTIDINE 20 MILLIGRAM(S): 10 INJECTION INTRAVENOUS at 18:18

## 2020-08-26 NOTE — BEHAVIORAL HEALTH ASSESSMENT NOTE - HPI (INCLUDE ILLNESS QUALITY, SEVERITY, DURATION, TIMING, CONTEXT, MODIFYING FACTORS, ASSOCIATED SIGNS AND SYMPTOMS)
33 y/o M, single, non-domiciled, unemployed, s/p court mandated rehab in 2012 for cocaine arrest, recent psych hospitalization for etoh abuse, depression and suicidality. Patient now presents with worsening depression, suicidal threats, etoh abuse.     Patient recently discharged from [facility]. Per  note with collateral from aunt - patient had relapsed into depression, etoh abuse, suicidality after d/c and did not f/u with his outpt plan, is noncompliant with meds.    Patient got CIWAs ~5 in AM and in ED. On exam today patient somnolent, resting comfortably but difficult to awaken, no tremors on limited exam, unable to stay awake long enough to engage.  Patient made suicidal statements to multiple providers in ED as well.

## 2020-08-26 NOTE — DISCHARGE NOTE PROVIDER - HOSPITAL COURSE
34 M PMHx cocaine use and alcohol abuse who presents to the ED as a walk in accompanied by his aunt for suicidal ideation and alcohol intoxication. To be admitted for alcohol withdrawal Mr. Ariza is a 35 yo man with hx of polysubstance abuse disorder, including cocaine use and alcohol abuse presenting with suicidal ideation and alcohol intoxication, subsequently admitted for psych eval and observation/management for alcohol withdrawal and suicidal ideation. Pt not in active alcohol withdrawal and pending bed for inpatient psych admission for severe depression and high risk for suicide attempt.        1:  Alcohol withdrawal syndrome without complication.  Plan:     - discontinue with symptom triggered protocol with PRN Ativan IV     - continue MVI, thiamine folic acid    - EtOH cessation counseling.         2: Generalized abdominal pain.  Plan:     - vague pain, but unremarkable abd exam and labs unremarkable. Suspect gastritis from alcohol use    - Zofran PRN for nausea and/or vomiting    - famotidine 20mg BID PRN.         3: Suicidal ideation.  Plan:     - Pt with suicidal ideation but stated it is likely to be exacerbated outside of the hospital. No other hallucinations. Evaluated by psych. Pt agreeable to voluntary inpatient psych admission.    - c/w gabapentin, Seroquel, hydroxyzine, sertraline    - patient [facility] admission    -  Psych f/u noted --> continue constant observation.         4: Multiple substance abuse.  Plan:     - last cocaine use on 8/24     - SW consulted, recs appreciated    - psych following.         5: Need for prophylactic measure.  Plan:     - Lovenox for dvt ppx, dash diet                Case reviewed with the attending of record  ______ on 08/____/2020 and patient cleared for discharge.         Reviewed discharge medications with patient; All new medications requiring new prescription sent to pharmacy of patients choice. Reviewed need for prescription for previous home medications and new prescriptions sent if requested. Patient in agreement and understands. Mr. Ariza is a 35 yo man with hx of polysubstance abuse disorder, including cocaine use and alcohol abuse presenting with suicidal ideation and alcohol intoxication, subsequently admitted for psych eval and observation/management for alcohol withdrawal and suicidal ideation. Pt not in active alcohol withdrawal and pending bed for inpatient psych admission for severe depression and high risk for suicide attempt.        1:  Alcohol withdrawal syndrome without complication.  Plan:     - discontinue with symptom triggered protocol with PRN Ativan IV     - continue MVI, thiamine folic acid    - EtOH cessation counseling.         2: Generalized abdominal pain.  Plan:     - vague pain, but unremarkable abd exam and labs unremarkable. Suspect gastritis from alcohol use    - Zofran PRN for nausea and/or vomiting    - famotidine 20mg BID PRN.         3: Suicidal ideation.  Plan:     - Pt with suicidal ideation but stated it is likely to be exacerbated outside of the hospital. No other hallucinations. Evaluated by psych. Pt agreeable to voluntary inpatient psych admission.    - c/w gabapentin, Seroquel, hydroxyzine, sertraline    - patient [facility] admission    -  Psych f/u noted --> continue constant observation.         4: Multiple substance abuse.  Plan:     - last cocaine use on 8/24     - SW consulted, recs appreciated    - psych following.         5: Need for prophylactic measure.  Plan:     - Lovenox for dvt ppx, dash diet                Case reviewed with the attending of record Malik  on 09/1/2020 and patient cleared for discharge.         Reviewed discharge medications with patient; All new medications requiring new prescription sent to pharmacy of patients choice. Reviewed need for prescription for previous home medications and new prescriptions sent if requested. Patient in agreement and understands.

## 2020-08-26 NOTE — PROGRESS NOTE ADULT - PROBLEM SELECTOR PLAN 1
Pt mainly sleeping on librium taper --> CIWA scores ranging 3-6 today  - d/c librium taper and continue with symptom triggered protocol with PRN Ativan IV   -continue MVI, thiamine folic acid  - d/c IV fluids. Pt tolerated oral diet

## 2020-08-26 NOTE — DISCHARGE NOTE PROVIDER - NSDCCPCAREPLAN_GEN_ALL_CORE_FT
PRINCIPAL DISCHARGE DIAGNOSIS  Diagnosis: Alcohol withdrawal  Assessment and Plan of Treatment:       SECONDARY DISCHARGE DIAGNOSES  Diagnosis: Multiple substance abuse  Assessment and Plan of Treatment: Multiple substance abuse PRINCIPAL DISCHARGE DIAGNOSIS  Diagnosis: Alcohol withdrawal  Assessment and Plan of Treatment: admission to [facility] to help with detox and suicide ideations      SECONDARY DISCHARGE DIAGNOSES  Diagnosis: Multiple substance abuse  Assessment and Plan of Treatment: continue taking your medication as prescribed and follow up with psychiatry and PCP, Keep around a good support sytem PRINCIPAL DISCHARGE DIAGNOSIS  Diagnosis: Alcohol withdrawal  Assessment and Plan of Treatment: admission to [facility] to help with detox and suicide ideations  follow up with PCP after [facility] for general follow up      SECONDARY DISCHARGE DIAGNOSES  Diagnosis: Multiple substance abuse  Assessment and Plan of Treatment: continue taking your medication as prescribed and follow up with psychiatry and PCP, Keep around a good support sytem

## 2020-08-26 NOTE — BEHAVIORAL HEALTH ASSESSMENT NOTE - NSBHCHARTREVIEWLAB_PSY_A_CORE FT
13.9   3.42  )-----------( 107      ( 26 Aug 2020 05:45 )             39.8     08-26    140  |  104  |  12  ----------------------------<  77  3.8   |  22  |  0.52    Ca    8.1<L>      26 Aug 2020 05:45  Phos  2.4     08-26  Mg     1.9     08-26    TPro  5.5<L>  /  Alb  3.6  /  TBili  0.5  /  DBili  < 0.2  /  AST  32  /  ALT  21  /  AlkPhos  65  08-26      LIVER FUNCTIONS - ( 26 Aug 2020 05:45 )  Alb: 3.6 g/dL / Pro: 5.5 g/dL / ALK PHOS: 65 u/L / ALT: 21 u/L / AST: 32 u/L / GGT: x           PT/INR - ( 26 Aug 2020 05:45 )   PT: 12.5 SEC;   INR: 1.10

## 2020-08-26 NOTE — PROGRESS NOTE ADULT - PROBLEM SELECTOR PLAN 3
none currently, no other hallucinations. Evaluated by psych  - c/w gabapentin, seroquel, hydroxyzine, sertraline  -maintain 1:1  - -patient may require [facility] admission once medically stable  - f/u further psych recs

## 2020-08-26 NOTE — BEHAVIORAL HEALTH ASSESSMENT NOTE - SUMMARY
33 y/o M, single, non-domiciled, unemployed, s/p court mandated rehab in 2012 for cocaine arrest, recent psych hospitalization for etoh abuse, depression and suicidality. Patient now presents with worsening depression, suicidal threats, etoh abuse.     Patient p/w etoh abuse in context of depression, medication noncompliance, made suicidal statements with aunt, recently admitted to [facility] for 2x suicide attempts did not f/u with care thereafter. Currently in etoh withdrawal. Patient somnolent, may be too sedated on librium taper, OK to reduce or stop and just use CIWA with symptom trigger ativan for now.     Recommendations:   - librium taper d/c'd for sedation, restart if CIWA scores spike dramatically otherwise c/w CIWA protocol   -c/w 1:1 for danger to self   - c/w psych meds, seroquel 100mg qHS, Sertraline 100mg QD, Gabapentin 300mg TID (hold meds for severe sedation)  - OK to stop naltrexone for now   -will likely require [facility] admission once medically stable  -Will follow

## 2020-08-26 NOTE — BEHAVIORAL HEALTH ASSESSMENT NOTE - SUICIDE RISK FACTORS
Hopelessness or despair/Current mood episode/Alcohol/Substance abuse disorders/Insomnia/Impulsivity/Mood Disorder current/past

## 2020-08-26 NOTE — PROGRESS NOTE ADULT - SUBJECTIVE AND OBJECTIVE BOX
Patient is a 34y old  Male who presents with a chief complaint of Alcohol withdrawal (26 Aug 2020 08:00)    SUBJECTIVE / OVERNIGHT EVENTS:  Patient drowsy and sleeping majority of the morning. Patient ate lunch and started complaining nausea without vomiting and generalized abdominal pain. No fever, chills, SOB, chest pain.     MEDICATIONS  (STANDING):  enoxaparin Injectable 40 milliGRAM(s) SubCutaneous daily  famotidine Injectable 20 milliGRAM(s) IV Push once  folic acid 1 milliGRAM(s) Oral daily  gabapentin 300 milliGRAM(s) Oral three times a day  multivitamin 1 Tablet(s) Oral daily  potassium phosphate / sodium phosphate Tablet (K-PHOS No. 2) 1 Tablet(s) Oral once  QUEtiapine 100 milliGRAM(s) Oral at bedtime  sertraline 100 milliGRAM(s) Oral daily  sodium chloride 0.9%. 1000 milliLiter(s) (100 mL/Hr) IV Continuous <Continuous>  thiamine 100 milliGRAM(s) Oral daily    MEDICATIONS  (PRN):  hydrOXYzine hydrochloride 50 milliGRAM(s) Oral every 6 hours PRN Anxiety  LORazepam   Injectable 2 milliGRAM(s) IV Push every 1 hour PRN Symptom-triggered: each CIWA -Ar score 8 or GREATER  ondansetron Injectable 4 milliGRAM(s) IV Push every 4 hours PRN Nausea and/or Vomiting      Vital Signs Last 24 Hrs  T(C): 36.3 (26 Aug 2020 14:27), Max: 36.8 (25 Aug 2020 22:56)  T(F): 97.4 (26 Aug 2020 14:27), Max: 98.2 (25 Aug 2020 22:56)  HR: 50 (26 Aug 2020 14:27) (48 - 77)  BP: 114/77 (26 Aug 2020 14:27) (105/69 - 127/76)  RR: 17 (26 Aug 2020 14:27) (16 - 18)  SpO2: 97% (26 Aug 2020 14:27) (96% - 100%)        PHYSICAL EXAM  GENERAL: NAD,   HEAD:  Atraumatic, Normocephalic  EYES: EOMI, PERRLA, conjunctiva and sclera clear  NECK: Supple, No JVD  CHEST/LUNG: Clear to auscultation bilaterally; No wheeze  HEART: Regular rate and rhythm; No murmurs, rubs, or gallops  ABDOMEN: Soft, Nontender, Nondistended; Bowel sounds present  EXTREMITIES:  2+ Peripheral Pulses, No clubbing, cyanosis, or edema  PSYCH: AAOx3  SKIN: No rashes or lesions    LABS:                        13.9   3.42  )-----------( 107      ( 26 Aug 2020 05:45 )             39.8     08-26    140  |  104  |  12  ----------------------------<  77  3.8   |  22  |  0.52    Ca    8.1<L>      26 Aug 2020 05:45  Phos  2.4     08-26  Mg     1.9     08-26    TPro  5.5<L>  /  Alb  3.6  /  TBili  0.5  /  DBili  < 0.2  /  AST  32  /  ALT  21  /  AlkPhos  65  08-26    PT/INR - ( 26 Aug 2020 05:45 )   PT: 12.5 SEC;   INR: 1.10                    RADIOLOGY & ADDITIONAL TESTS:    Imaging Personally Reviewed:  Consultant(s) Notes Reviewed:    Care Discussed with Consultants/Other Providers: Patient is a 34y old  Male who presents with a chief complaint of Alcohol withdrawal (26 Aug 2020 08:00)    SUBJECTIVE / OVERNIGHT EVENTS:  Patient drowsy and sleeping majority of the morning. Patient ate lunch and started complaining nausea without vomiting and generalized abdominal pain. No fever, chills, SOB, chest pain. Pt unable to confirm if he has suicidal ideation while in the hospital. He thinks suicidal thoughts may have been precipitated by being off his psych meds for ~2 weeks.    MEDICATIONS  (STANDING):  enoxaparin Injectable 40 milliGRAM(s) SubCutaneous daily  famotidine Injectable 20 milliGRAM(s) IV Push once  folic acid 1 milliGRAM(s) Oral daily  gabapentin 300 milliGRAM(s) Oral three times a day  multivitamin 1 Tablet(s) Oral daily  potassium phosphate / sodium phosphate Tablet (K-PHOS No. 2) 1 Tablet(s) Oral once  QUEtiapine 100 milliGRAM(s) Oral at bedtime  sertraline 100 milliGRAM(s) Oral daily  sodium chloride 0.9%. 1000 milliLiter(s) (100 mL/Hr) IV Continuous <Continuous>  thiamine 100 milliGRAM(s) Oral daily    MEDICATIONS  (PRN):  hydrOXYzine hydrochloride 50 milliGRAM(s) Oral every 6 hours PRN Anxiety  LORazepam   Injectable 2 milliGRAM(s) IV Push every 1 hour PRN Symptom-triggered: each CIWA -Ar score 8 or GREATER  ondansetron Injectable 4 milliGRAM(s) IV Push every 4 hours PRN Nausea and/or Vomiting      Vital Signs Last 24 Hrs  T(C): 36.3 (26 Aug 2020 14:27), Max: 36.8 (25 Aug 2020 22:56)  T(F): 97.4 (26 Aug 2020 14:27), Max: 98.2 (25 Aug 2020 22:56)  HR: 50 (26 Aug 2020 14:27) (48 - 77)  BP: 114/77 (26 Aug 2020 14:27) (105/69 - 127/76)  RR: 17 (26 Aug 2020 14:27) (16 - 18)  SpO2: 97% (26 Aug 2020 14:27) (96% - 100%)        PHYSICAL EXAM  GENERAL: NAD, laying in bed with eyes closed by conversant  CHEST/LUNG: Clear to auscultation bilaterally; No wheeze  HEART: Regular rate and rhythm; No murmurs, rubs, or gallops  ABDOMEN: deferred per patient request due to discomfort; non-distended  EXTREMITIES:  ROM intact, No clubbing, cyanosis, or edema  PSYCH: AAOx3        LABS:                        13.9   3.42  )-----------( 107      ( 26 Aug 2020 05:45 )             39.8     08-26    140  |  104  |  12  ----------------------------<  77  3.8   |  22  |  0.52    Ca    8.1<L>      26 Aug 2020 05:45  Phos  2.4     08-26  Mg     1.9     08-26    TPro  5.5<L>  /  Alb  3.6  /  TBili  0.5  /  DBili  < 0.2  /  AST  32  /  ALT  21  /  AlkPhos  65  08-26    PT/INR - ( 26 Aug 2020 05:45 )   PT: 12.5 SEC;   INR: 1.10                    RADIOLOGY & ADDITIONAL TESTS:    Imaging Personally Reviewed:  Consultant(s) Notes Reviewed:    Care Discussed with Consultants/Other Providers:

## 2020-08-26 NOTE — DISCHARGE NOTE PROVIDER - CARE PROVIDER_API CALL
your PCP,   Phone: (   )    -  Fax: (   )    -  Established Patient  Follow Up Time: your PCP,   Phone: (   )    -  Fax: (   )    -  Established Patient  Follow Up Time:     [facility],   Phone: (   )    -  Fax: (   )    -  Follow Up Time:

## 2020-08-26 NOTE — BEHAVIORAL HEALTH ASSESSMENT NOTE - NSBHCHARTREVIEWVS_PSY_A_CORE FT
Vital Signs Last 24 Hrs  T(C): 36.4 (26 Aug 2020 13:07), Max: 36.8 (25 Aug 2020 22:56)  T(F): 97.6 (26 Aug 2020 13:07), Max: 98.2 (25 Aug 2020 22:56)  HR: 56 (26 Aug 2020 13:07) (48 - 77)  BP: 117/63 (26 Aug 2020 13:07) (105/69 - 127/76)  BP(mean): --  RR: 17 (26 Aug 2020 13:07) (16 - 18)  SpO2: 99% (26 Aug 2020 13:07) (96% - 100%)

## 2020-08-26 NOTE — PROGRESS NOTE ADULT - ASSESSMENT
Mr. Ariza is a 33 yo man with hx of polysubstance abuse disorder, including cocaine use and alcohol abuse presenting with suicidal ideation and alcohol intoxication, subsequently admitted for psych eval and observation/management for alcohol withdrawal

## 2020-08-26 NOTE — DISCHARGE NOTE PROVIDER - PROVIDER TOKENS
FREE:[LAST:[your PCP],PHONE:[(   )    -],FAX:[(   )    -],ESTABLISHEDPATIENT:[T]] FREE:[LAST:[your PCP],PHONE:[(   )    -],FAX:[(   )    -],ESTABLISHEDPATIENT:[T]],FREE:[LAST:[[facility]],PHONE:[(   )    -],FAX:[(   )    -]]

## 2020-08-26 NOTE — DISCHARGE NOTE PROVIDER - NSDCMRMEDTOKEN_GEN_ALL_CORE_FT
gabapentin 300 mg oral capsule: 1 cap(s) orally 3 times a day  hydrOXYzine hydrochloride 50 mg oral tablet: 1 tab(s) orally every 6 hours, As Needed -anxiety - for anxiety   naltrexone 50 mg oral tablet: 1 tab(s) orally once a day  QUEtiapine 100 mg oral tablet: 1 tab(s) orally once a day (at bedtime)  sertraline 100 mg oral tablet: 1 tab(s) orally once a day acetaminophen 325 mg oral tablet: 2 tab(s) orally every 6 hours, As needed, Temp greater or equal to 38C (100.4F), Mild Pain (1 - 3), Moderate Pain (4 - 6)  cyclobenzaprine 5 mg oral tablet: 1 tab(s) orally 3 times a day, As needed, Muscle Spasm  folic acid 1 mg oral tablet: 1 tab(s) orally once a day  gabapentin 300 mg oral capsule: 1 cap(s) orally 3 times a day  hydrOXYzine hydrochloride 50 mg oral tablet: 1 tab(s) orally every 6 hours, As Needed -anxiety - for anxiety   melatonin 3 mg oral tablet: 1 tab(s) orally once a day (at bedtime)  Multiple Vitamins oral tablet: 1 tab(s) orally once a day  naltrexone 50 mg oral tablet: 1 tab(s) orally once a day  QUEtiapine 100 mg oral tablet: 1 tab(s) orally once a day (at bedtime)  sertraline 100 mg oral tablet: 1 tab(s) orally once a day

## 2020-08-27 DIAGNOSIS — F10.20 ALCOHOL DEPENDENCE, UNCOMPLICATED: ICD-10-CM

## 2020-08-27 DIAGNOSIS — F32.9 MAJOR DEPRESSIVE DISORDER, SINGLE EPISODE, UNSPECIFIED: ICD-10-CM

## 2020-08-27 LAB
ANION GAP SERPL CALC-SCNC: 10 MMO/L — SIGNIFICANT CHANGE UP (ref 7–14)
BUN SERPL-MCNC: 11 MG/DL — SIGNIFICANT CHANGE UP (ref 7–23)
CALCIUM SERPL-MCNC: 8.6 MG/DL — SIGNIFICANT CHANGE UP (ref 8.4–10.5)
CHLORIDE SERPL-SCNC: 101 MMOL/L — SIGNIFICANT CHANGE UP (ref 98–107)
CO2 SERPL-SCNC: 25 MMOL/L — SIGNIFICANT CHANGE UP (ref 22–31)
CREAT SERPL-MCNC: 0.6 MG/DL — SIGNIFICANT CHANGE UP (ref 0.5–1.3)
GLUCOSE SERPL-MCNC: 102 MG/DL — HIGH (ref 70–99)
HCT VFR BLD CALC: 45.9 % — SIGNIFICANT CHANGE UP (ref 39–50)
HGB BLD-MCNC: 15.3 G/DL — SIGNIFICANT CHANGE UP (ref 13–17)
MAGNESIUM SERPL-MCNC: 1.7 MG/DL — SIGNIFICANT CHANGE UP (ref 1.6–2.6)
MCHC RBC-ENTMCNC: 33.3 % — SIGNIFICANT CHANGE UP (ref 32–36)
MCHC RBC-ENTMCNC: 33.3 PG — SIGNIFICANT CHANGE UP (ref 27–34)
MCV RBC AUTO: 100 FL — SIGNIFICANT CHANGE UP (ref 80–100)
NRBC # FLD: 0 K/UL — SIGNIFICANT CHANGE UP (ref 0–0)
PHOSPHATE SERPL-MCNC: 3.2 MG/DL — SIGNIFICANT CHANGE UP (ref 2.5–4.5)
PLATELET # BLD AUTO: 124 K/UL — LOW (ref 150–400)
PMV BLD: 9 FL — SIGNIFICANT CHANGE UP (ref 7–13)
POTASSIUM SERPL-MCNC: 3.8 MMOL/L — SIGNIFICANT CHANGE UP (ref 3.5–5.3)
POTASSIUM SERPL-SCNC: 3.8 MMOL/L — SIGNIFICANT CHANGE UP (ref 3.5–5.3)
RBC # BLD: 4.59 M/UL — SIGNIFICANT CHANGE UP (ref 4.2–5.8)
RBC # FLD: 13.7 % — SIGNIFICANT CHANGE UP (ref 10.3–14.5)
SODIUM SERPL-SCNC: 136 MMOL/L — SIGNIFICANT CHANGE UP (ref 135–145)
WBC # BLD: 5.39 K/UL — SIGNIFICANT CHANGE UP (ref 3.8–10.5)
WBC # FLD AUTO: 5.39 K/UL — SIGNIFICANT CHANGE UP (ref 3.8–10.5)

## 2020-08-27 PROCEDURE — 99233 SBSQ HOSP IP/OBS HIGH 50: CPT

## 2020-08-27 PROCEDURE — 99232 SBSQ HOSP IP/OBS MODERATE 35: CPT

## 2020-08-27 RX ORDER — THIAMINE MONONITRATE (VIT B1) 100 MG
500 TABLET ORAL THREE TIMES A DAY
Refills: 0 | Status: COMPLETED | OUTPATIENT
Start: 2020-08-27 | End: 2020-08-30

## 2020-08-27 RX ORDER — ACETAMINOPHEN 500 MG
650 TABLET ORAL EVERY 6 HOURS
Refills: 0 | Status: DISCONTINUED | OUTPATIENT
Start: 2020-08-27 | End: 2020-09-01

## 2020-08-27 RX ORDER — THIAMINE MONONITRATE (VIT B1) 100 MG
100 TABLET ORAL DAILY
Refills: 0 | Status: DISCONTINUED | OUTPATIENT
Start: 2020-08-27 | End: 2020-08-27

## 2020-08-27 RX ORDER — CYCLOBENZAPRINE HYDROCHLORIDE 10 MG/1
5 TABLET, FILM COATED ORAL THREE TIMES A DAY
Refills: 0 | Status: DISCONTINUED | OUTPATIENT
Start: 2020-08-27 | End: 2020-09-01

## 2020-08-27 RX ADMIN — GABAPENTIN 300 MILLIGRAM(S): 400 CAPSULE ORAL at 06:56

## 2020-08-27 RX ADMIN — QUETIAPINE FUMARATE 100 MILLIGRAM(S): 200 TABLET, FILM COATED ORAL at 20:26

## 2020-08-27 RX ADMIN — CYCLOBENZAPRINE HYDROCHLORIDE 5 MILLIGRAM(S): 10 TABLET, FILM COATED ORAL at 18:17

## 2020-08-27 RX ADMIN — GABAPENTIN 300 MILLIGRAM(S): 400 CAPSULE ORAL at 12:51

## 2020-08-27 RX ADMIN — SERTRALINE 100 MILLIGRAM(S): 25 TABLET, FILM COATED ORAL at 12:50

## 2020-08-27 RX ADMIN — Medication 650 MILLIGRAM(S): at 18:50

## 2020-08-27 RX ADMIN — GABAPENTIN 300 MILLIGRAM(S): 400 CAPSULE ORAL at 20:25

## 2020-08-27 RX ADMIN — Medication 50 MILLIGRAM(S): at 20:25

## 2020-08-27 RX ADMIN — Medication 650 MILLIGRAM(S): at 18:13

## 2020-08-27 RX ADMIN — ENOXAPARIN SODIUM 40 MILLIGRAM(S): 100 INJECTION SUBCUTANEOUS at 12:50

## 2020-08-27 RX ADMIN — Medication 50 MILLIGRAM(S): at 03:46

## 2020-08-27 RX ADMIN — Medication 1 MILLIGRAM(S): at 12:50

## 2020-08-27 RX ADMIN — Medication 105 MILLIGRAM(S): at 20:25

## 2020-08-27 RX ADMIN — Medication 1 TABLET(S): at 12:50

## 2020-08-27 RX ADMIN — Medication 100 MILLIGRAM(S): at 12:50

## 2020-08-27 NOTE — PROGRESS NOTE BEHAVIORAL HEALTH - NSBHCONSULTMEDS_PSY_A_CORE FT
seroquel 100 mg QHS  sertraline 100 mg QD  gabapentin 300 mg TID  hold meds for severe sedation    consider restart Librium taper if patient's somnolence improves seroquel 100 mg QHS  sertraline 100 mg QD  gabapentin 300 mg TID  hold meds for severe sedation    Monitor closely for alcohol withdrawal sign and symptoms, consider restart Librium taper if patient's somnolence improves seroquel 100 mg QHS  sertraline 100 mg QD  gabapentin 300 mg TID  hold meds for severe sedation    Thiamine 500mg IV tid for 2-3 days.    Monitor closely for alcohol withdrawal sign and symptoms, consider restart Librium taper if patient's somnolence improves

## 2020-08-27 NOTE — PROGRESS NOTE ADULT - PROBLEM SELECTOR PLAN 3
none currently, no other hallucinations. Evaluated by psych  - c/w gabapentin, seroquel, hydroxyzine, sertraline  -maintain 1:1  - -patient may require [facility] admission once medically stable  - f/u further psych recs Pt with suicidal ideation but stated it is likely to be exacerbated outside of the hospital. No other hallucinations. Evaluated by psych --> as per psych, "While patient is at high risk of suicide outside of hospital, he is at low risk of suicide while in the hospital"  - c/w gabapentin, seroquel, hydroxyzine, sertraline  - d/c 1:1 constant observation  - patient will require [facility] admission once determined not to be in active EtOH withdrawal  - f/u further psych recs

## 2020-08-27 NOTE — PROGRESS NOTE ADULT - PROBLEM SELECTOR PLAN 5
lovenox for dvt ppx, dash diet lovenox for dvt ppx, dash diet    DISPO: Anticipate discharge in the next 24-48 hours.

## 2020-08-27 NOTE — PROGRESS NOTE BEHAVIORAL HEALTH - NSBHCONSULTOBSREASON_PSY_A_CORE FT
While patient is at high risk of suicide outside of hospital, he is at low risk of suicide while in the hospital pt. is endorsing SI with plans

## 2020-08-27 NOTE — PROGRESS NOTE BEHAVIORAL HEALTH - CASE SUMMARY
Patient seen and evaluated with Dr. Lucas, I agree with above assessment and plan, pt. quite drowsy on exam, grossly oriented, reported that he is feeling very depressed, endorsing SI with plans to drink until death. Patient denies current  nad VH, denies Patient seen and evaluated with Dr. Lucas, I agree with above assessment and plan, pt. quite drowsy on exam, grossly oriented, reported that he is feeling very depressed, endorsing SI with plans to drink until death. Patient denies current AH and VH, denies HI. Mild tremors noted on exam, no tongue fasciculation noted, recommend to monitor closely and if sedation improves and if withdrawal sxs started to get worse, consider restarting Librium taper. Continue meds. as written above, Ativan PRN as per MICHAEL, continue COS 1;1 for safety, will follow , discussed with Dr. Cedillo in person. Patient seen and evaluated with Dr. Lucas, I agree with above assessment and plan, pt. quite drowsy on exam, grossly oriented, reported that he is feeling very depressed, endorsing SI with plans to drink until death. Patient denies current AH and VH, denies HI. Mild tremors noted on exam, no tongue fasciculation noted, recommend to monitor closely and if sedation improves and if withdrawal sxs started to get worse, consider restarting Librium taper. Continue meds. as written above, Ativan PRN as per MICHAEL, continue COS 1:1 for safety, pt. will need an inpatient psychiatric admission once medically optimized, will follow , discussed with Dr. Cedillo in person.

## 2020-08-27 NOTE — PROGRESS NOTE BEHAVIORAL HEALTH - NSBHFUPINTERVALHXFT_PSY_A_CORE
Chart reviewed. Patient received two doses of PRN hydroxyzine for anxiety overnight. Did not receive any PRN lorazepam for symptom triggered CIWA. Patient had been on Librium taper but was very sedated so this was converted to symptom triggered CIWA.    Patient seen and evaluated. Patient states that he feels terrible and slept poorly last night. He states that he feels very tired and wants to sleep. He endorses tremors. He states that he has not been to the ICU or had seizures before but has had severe withdrawal symptoms including tremors, diaphoresis, and hallucinations (including tactile). Patient states that his mood has been very poor and that he has thought about or tried to kill himself a few times recently including thinking about jumping off a roof two months ago and slitting his wrist a couple of days ago (he states that he cut himself with a razor superficially at the time). He states that he currently has suicidal ideation; no plan in hospital but states that if he were not here he would drink until he was dead. He states that after his prior admission to [facility], he was placed on Zoloft 100 mg, Neurontin 300 mg TID, and Seroquel 100 mg QHS. He has not been adherent with his medications. He went to a shelter. He did not go to his follow up appointment, which was in [place].     Patient provided contact information for aunt Jyothi at . He states that she is not his real aunt but has known him since he was a baby and has been caring for him (both parents are dead). Chart reviewed. Patient received two doses of PRN hydroxyzine for anxiety overnight. Did not receive any PRN lorazepam for symptom triggered CIWA. Patient had been on Librium taper but was very sedated so this was converted to symptom triggered CIWA.    Patient seen and evaluated. Patient states that he feels terrible and slept poorly last night. He states that he feels very tired and wants to sleep. He endorses tremors. He states that he has not been to the ICU or had seizures before but has had  withdrawal symptoms including tremors, diaphoresis, and hallucinations (including tactile). Patient states that his mood has been very poor and that he has thought about or tried to kill himself a few times recently including thinking about jumping off a roof two months ago and slitting his wrist a couple of days ago (he states that he cut himself with a razor superficially at the time). He states that he currently has suicidal ideation; no plan in hospital but states that if he were not here he would drink until he was dead. He states that after his prior admission to [facility], he was placed on Zoloft 100 mg, Neurontin 300 mg TID, and Seroquel 100 mg QHS. He has not been adherent with his medications. Stated that he went to a shelter. He did not go to his follow up appointment, which was in [place].     Patient provided contact information for aunt Jyothi at . He states that she is not his real aunt but has known him since he was a baby and has been caring for him (both parents are dead).

## 2020-08-27 NOTE — PROGRESS NOTE ADULT - SUBJECTIVE AND OBJECTIVE BOX
Patient is a 34y old  Male who presents with a chief complaint of Alcohol withdrawal (26 Aug 2020 17:34)    SUBJECTIVE / OVERNIGHT EVENTS:  Patient with CIWA score ranges 4-6 overnight. No need for PRN Ativan overnight for withdrawal symptoms.    MEDICATIONS  (STANDING):  enoxaparin Injectable 40 milliGRAM(s) SubCutaneous daily  folic acid 1 milliGRAM(s) Oral daily  gabapentin 300 milliGRAM(s) Oral three times a day  multivitamin 1 Tablet(s) Oral daily  QUEtiapine 100 milliGRAM(s) Oral at bedtime  sertraline 100 milliGRAM(s) Oral daily  thiamine 100 milliGRAM(s) Oral daily    MEDICATIONS  (PRN):  hydrOXYzine hydrochloride 50 milliGRAM(s) Oral every 6 hours PRN Anxiety  LORazepam   Injectable 2 milliGRAM(s) IV Push every 1 hour PRN Symptom-triggered: each CIWA -Ar score 8 or GREATER  ondansetron Injectable 4 milliGRAM(s) IV Push every 4 hours PRN Nausea and/or Vomiting      Vital Signs Last 24 Hrs  T(C): 36.6 (27 Aug 2020 07:57), Max: 37 (27 Aug 2020 00:23)  T(F): 97.9 (27 Aug 2020 07:57), Max: 98.6 (27 Aug 2020 00:23)  HR: 71 (27 Aug 2020 07:57) (47 - 77)  BP: 111/71 (27 Aug 2020 07:57) (107/72 - 125/85)  RR: 17 (27 Aug 2020 07:57) (16 - 18)  SpO2: 98% (27 Aug 2020 07:57) (96% - 100%)          PHYSICAL EXAM  GENERAL: NAD, well-developed  HEAD:  Atraumatic, Normocephalic  EYES: EOMI, PERRLA, conjunctiva and sclera clear  NECK: Supple, No JVD  CHEST/LUNG: Clear to auscultation bilaterally; No wheeze  HEART: Regular rate and rhythm; No murmurs, rubs, or gallops  ABDOMEN: Soft, Nontender, Nondistended; Bowel sounds present  EXTREMITIES:  2+ Peripheral Pulses, No clubbing, cyanosis, or edema  PSYCH: AAOx3  SKIN: No rashes or lesions    LABS:                        15.3   5.39  )-----------( 124      ( 27 Aug 2020 07:00 )             45.9     08-27    136  |  101  |  11  ----------------------------<  102<H>  3.8   |  25  |  0.60    Ca    8.6      27 Aug 2020 07:00  Phos  3.2     08-27  Mg     1.7     08-27    TPro  5.5<L>  /  Alb  3.6  /  TBili  0.5  /  DBili  < 0.2  /  AST  32  /  ALT  21  /  AlkPhos  65  08-26    PT/INR - ( 26 Aug 2020 05:45 )   PT: 12.5 SEC;   INR: 1.10                    RADIOLOGY & ADDITIONAL TESTS:    Imaging Personally Reviewed:  Consultant(s) Notes Reviewed:    Care Discussed with Consultants/Other Providers: Patient is a 34y old  Male who presents with a chief complaint of Alcohol withdrawal (26 Aug 2020 17:34)    SUBJECTIVE / OVERNIGHT EVENTS:  Patient with CIWA score ranges 4-6 overnight. No need for PRN Ativan overnight for withdrawal symptoms. Patient very drowsy and sleeping for majority of the morning. No fever, chills, chest pain, SOB, n/v. Pt still with tremors.      MEDICATIONS  (STANDING):  enoxaparin Injectable 40 milliGRAM(s) SubCutaneous daily  folic acid 1 milliGRAM(s) Oral daily  gabapentin 300 milliGRAM(s) Oral three times a day  multivitamin 1 Tablet(s) Oral daily  QUEtiapine 100 milliGRAM(s) Oral at bedtime  sertraline 100 milliGRAM(s) Oral daily  thiamine 100 milliGRAM(s) Oral daily    MEDICATIONS  (PRN):  hydrOXYzine hydrochloride 50 milliGRAM(s) Oral every 6 hours PRN Anxiety  LORazepam   Injectable 2 milliGRAM(s) IV Push every 1 hour PRN Symptom-triggered: each CIWA -Ar score 8 or GREATER  ondansetron Injectable 4 milliGRAM(s) IV Push every 4 hours PRN Nausea and/or Vomiting      Vital Signs Last 24 Hrs  T(C): 36.6 (27 Aug 2020 07:57), Max: 37 (27 Aug 2020 00:23)  T(F): 97.9 (27 Aug 2020 07:57), Max: 98.6 (27 Aug 2020 00:23)  HR: 71 (27 Aug 2020 07:57) (47 - 77)  BP: 111/71 (27 Aug 2020 07:57) (107/72 - 125/85)  RR: 17 (27 Aug 2020 07:57) (16 - 18)  SpO2: 98% (27 Aug 2020 07:57) (96% - 100%)          PHYSICAL EXAM  GENERAL: NAD, drowsy  CHEST/LUNG: Clear to auscultation bilaterally; No wheeze  HEART: Regular rate and rhythm; No murmurs, rubs, or gallops  ABDOMEN: soft; non-distended  EXTREMITIES:  ROM intact, No clubbing, cyanosis, or edema  PSYCH: AAOx3        LABS:                        15.3   5.39  )-----------( 124      ( 27 Aug 2020 07:00 )             45.9     08-27    136  |  101  |  11  ----------------------------<  102<H>  3.8   |  25  |  0.60    Ca    8.6      27 Aug 2020 07:00  Phos  3.2     08-27  Mg     1.7     08-27             Consultant(s) Notes Reviewed:  yes  Care Discussed with Consultants/Other Providers:

## 2020-08-27 NOTE — PROGRESS NOTE ADULT - ASSESSMENT
Mr. Ariza is a 35 yo man with hx of polysubstance abuse disorder, including cocaine use and alcohol abuse presenting with suicidal ideation and alcohol intoxication, subsequently admitted for psych eval and observation/management for alcohol withdrawal Mr. Ariza is a 33 yo man with hx of polysubstance abuse disorder, including cocaine use and alcohol abuse presenting with suicidal ideation and alcohol intoxication, subsequently admitted for psych eval and observation/management for alcohol withdrawal and suicidal ideation.

## 2020-08-27 NOTE — PROGRESS NOTE ADULT - PROBLEM SELECTOR PLAN 1
-  continue with symptom triggered protocol with PRN Ativan IV   -continue MVI, thiamine folic acid  - EtOH cessation counseling - continue with symptom triggered protocol with PRN Ativan IV   - continue MVI, thiamine folic acid  - EtOH cessation counseling

## 2020-08-27 NOTE — PROGRESS NOTE ADULT - PROBLEM SELECTOR PLAN 4
last cocaine use on 8/24   -  consult  - psych following last cocaine use on 8/24   - SW consulted, recs appreciated  - psych following

## 2020-08-28 DIAGNOSIS — F33.9 MAJOR DEPRESSIVE DISORDER, RECURRENT, UNSPECIFIED: ICD-10-CM

## 2020-08-28 LAB
ANION GAP SERPL CALC-SCNC: 11 MMO/L — SIGNIFICANT CHANGE UP (ref 7–14)
BUN SERPL-MCNC: 13 MG/DL — SIGNIFICANT CHANGE UP (ref 7–23)
CALCIUM SERPL-MCNC: 8.5 MG/DL — SIGNIFICANT CHANGE UP (ref 8.4–10.5)
CHLORIDE SERPL-SCNC: 103 MMOL/L — SIGNIFICANT CHANGE UP (ref 98–107)
CO2 SERPL-SCNC: 26 MMOL/L — SIGNIFICANT CHANGE UP (ref 22–31)
CREAT SERPL-MCNC: 0.67 MG/DL — SIGNIFICANT CHANGE UP (ref 0.5–1.3)
GLUCOSE SERPL-MCNC: 105 MG/DL — HIGH (ref 70–99)
HCT VFR BLD CALC: 48.2 % — SIGNIFICANT CHANGE UP (ref 39–50)
HGB BLD-MCNC: 16.2 G/DL — SIGNIFICANT CHANGE UP (ref 13–17)
MAGNESIUM SERPL-MCNC: 1.8 MG/DL — SIGNIFICANT CHANGE UP (ref 1.6–2.6)
MCHC RBC-ENTMCNC: 32.9 PG — SIGNIFICANT CHANGE UP (ref 27–34)
MCHC RBC-ENTMCNC: 33.6 % — SIGNIFICANT CHANGE UP (ref 32–36)
MCV RBC AUTO: 98 FL — SIGNIFICANT CHANGE UP (ref 80–100)
NRBC # FLD: 0 K/UL — SIGNIFICANT CHANGE UP (ref 0–0)
PHOSPHATE SERPL-MCNC: 3.4 MG/DL — SIGNIFICANT CHANGE UP (ref 2.5–4.5)
PLATELET # BLD AUTO: 136 K/UL — LOW (ref 150–400)
PMV BLD: 9.4 FL — SIGNIFICANT CHANGE UP (ref 7–13)
POTASSIUM SERPL-MCNC: 4.2 MMOL/L — SIGNIFICANT CHANGE UP (ref 3.5–5.3)
POTASSIUM SERPL-SCNC: 4.2 MMOL/L — SIGNIFICANT CHANGE UP (ref 3.5–5.3)
RBC # BLD: 4.92 M/UL — SIGNIFICANT CHANGE UP (ref 4.2–5.8)
RBC # FLD: 13.6 % — SIGNIFICANT CHANGE UP (ref 10.3–14.5)
SODIUM SERPL-SCNC: 140 MMOL/L — SIGNIFICANT CHANGE UP (ref 135–145)
WBC # BLD: 6.17 K/UL — SIGNIFICANT CHANGE UP (ref 3.8–10.5)
WBC # FLD AUTO: 6.17 K/UL — SIGNIFICANT CHANGE UP (ref 3.8–10.5)

## 2020-08-28 PROCEDURE — 99233 SBSQ HOSP IP/OBS HIGH 50: CPT

## 2020-08-28 RX ORDER — LANOLIN ALCOHOL/MO/W.PET/CERES
6 CREAM (GRAM) TOPICAL ONCE
Refills: 0 | Status: COMPLETED | OUTPATIENT
Start: 2020-08-28 | End: 2020-08-28

## 2020-08-28 RX ADMIN — Medication 1 TABLET(S): at 11:24

## 2020-08-28 RX ADMIN — GABAPENTIN 300 MILLIGRAM(S): 400 CAPSULE ORAL at 06:00

## 2020-08-28 RX ADMIN — Medication 650 MILLIGRAM(S): at 17:26

## 2020-08-28 RX ADMIN — QUETIAPINE FUMARATE 100 MILLIGRAM(S): 200 TABLET, FILM COATED ORAL at 21:44

## 2020-08-28 RX ADMIN — Medication 105 MILLIGRAM(S): at 22:01

## 2020-08-28 RX ADMIN — GABAPENTIN 300 MILLIGRAM(S): 400 CAPSULE ORAL at 21:43

## 2020-08-28 RX ADMIN — Medication 105 MILLIGRAM(S): at 06:00

## 2020-08-28 RX ADMIN — Medication 6 MILLIGRAM(S): at 21:43

## 2020-08-28 RX ADMIN — Medication 50 MILLIGRAM(S): at 02:26

## 2020-08-28 RX ADMIN — SERTRALINE 100 MILLIGRAM(S): 25 TABLET, FILM COATED ORAL at 11:24

## 2020-08-28 RX ADMIN — GABAPENTIN 300 MILLIGRAM(S): 400 CAPSULE ORAL at 14:13

## 2020-08-28 RX ADMIN — Medication 1 MILLIGRAM(S): at 11:24

## 2020-08-28 RX ADMIN — ENOXAPARIN SODIUM 40 MILLIGRAM(S): 100 INJECTION SUBCUTANEOUS at 11:24

## 2020-08-28 RX ADMIN — Medication 650 MILLIGRAM(S): at 16:56

## 2020-08-28 RX ADMIN — CYCLOBENZAPRINE HYDROCHLORIDE 5 MILLIGRAM(S): 10 TABLET, FILM COATED ORAL at 11:24

## 2020-08-28 RX ADMIN — Medication 105 MILLIGRAM(S): at 14:13

## 2020-08-28 NOTE — PROGRESS NOTE ADULT - PROBLEM SELECTOR PLAN 3
Pt with suicidal ideation but stated it is likely to be exacerbated outside of the hospital. No other hallucinations. Evaluated by psych --> as per psych, "While patient is at high risk of suicide outside of hospital, he is at low risk of suicide while in the hospital"  - c/w gabapentin, seroquel, hydroxyzine, sertraline  - d/c 1:1 constant observation  - patient will require [facility] admission once determined not to be in active EtOH withdrawal  - f/u further psych recs Pt with suicidal ideation but stated it is likely to be exacerbated outside of the hospital. No other hallucinations. Evaluated by psych --> as per psych, "While patient is at high risk of suicide outside of hospital, he is at low risk of suicide while in the hospital"  - c/w gabapentin, seroquel, hydroxyzine, sertraline  - d/c 1:1 constant observation  - patient will require [facility] admission once determined not to be in active EtOH withdrawal  -  Psych f/u noted, case discussed , will hold discharge today as concern for withdrawal, will continue to monitor for s/s of withdrawal

## 2020-08-28 NOTE — PROGRESS NOTE ADULT - PROBLEM SELECTOR PLAN 5
lovenox for dvt ppx, dash diet    DISPO: Anticipate discharge in the next 24-48 hours. lovenox for dvt ppx, dash diet    DISPO: Anticipate discharge in the next 1-2 days to [facility]

## 2020-08-28 NOTE — PROGRESS NOTE BEHAVIORAL HEALTH - NSBHADMITCOUNSEL_PSY_A_CORE
risks and benefits of treatment options/importance of adherence to chosen treatment/client/family/caregiver education

## 2020-08-28 NOTE — PROGRESS NOTE ADULT - ASSESSMENT
Mr. Ariza is a 33 yo man with hx of polysubstance abuse disorder, including cocaine use and alcohol abuse presenting with suicidal ideation and alcohol intoxication, subsequently admitted for psych eval and observation/management for alcohol withdrawal and suicidal ideation.

## 2020-08-28 NOTE — PROGRESS NOTE BEHAVIORAL HEALTH - NSBHCONSULTMEDS_PSY_A_CORE FT
seroquel 100 mg QHS  sertraline 100 mg QD  gabapentin 300 mg TID  hold meds for severe sedation    Thiamine 500mg IV tid for 2-3 days.    Monitor closely for alcohol withdrawal sign and symptoms, consider restart Librium taper if patient started to show sxs of withdrawal, scoring high on CIWA or requiring multiple prns.

## 2020-08-28 NOTE — PROGRESS NOTE ADULT - SUBJECTIVE AND OBJECTIVE BOX
Patient is a 34y old  Male who presents with a chief complaint of Alcohol withdrawal (27 Aug 2020 10:01)      SUBJECTIVE / OVERNIGHT EVENTS:    MEDICATIONS  (STANDING):  enoxaparin Injectable 40 milliGRAM(s) SubCutaneous daily  folic acid 1 milliGRAM(s) Oral daily  gabapentin 300 milliGRAM(s) Oral three times a day  multivitamin 1 Tablet(s) Oral daily  QUEtiapine 100 milliGRAM(s) Oral at bedtime  sertraline 100 milliGRAM(s) Oral daily  thiamine IVPB 500 milliGRAM(s) IV Intermittent three times a day    MEDICATIONS  (PRN):  acetaminophen   Tablet .. 650 milliGRAM(s) Oral every 6 hours PRN Temp greater or equal to 38C (100.4F), Mild Pain (1 - 3), Moderate Pain (4 - 6)  cyclobenzaprine 5 milliGRAM(s) Oral three times a day PRN Muscle Spasm  hydrOXYzine hydrochloride 50 milliGRAM(s) Oral every 6 hours PRN Anxiety  LORazepam   Injectable 2 milliGRAM(s) IV Push every 1 hour PRN Symptom-triggered: each CIWA -Ar score 8 or GREATER  ondansetron Injectable 4 milliGRAM(s) IV Push every 4 hours PRN Nausea and/or Vomiting      Vital Signs Last 24 Hrs  T(C): 36.3 (28 Aug 2020 10:00), Max: 36.9 (27 Aug 2020 18:00)  T(F): 97.3 (28 Aug 2020 10:00), Max: 98.4 (27 Aug 2020 18:00)  HR: 51 (28 Aug 2020 10:00) (51 - 58)  BP: 114/71 (28 Aug 2020 10:00) (106/73 - 131/91)  BP(mean): --  RR: 17 (28 Aug 2020 10:00) (16 - 18)  SpO2: 97% (28 Aug 2020 10:00) (97% - 98%)  CAPILLARY BLOOD GLUCOSE        I&O's Summary      PHYSICAL EXAM:  GENERAL: NAD, well-developed  HEAD:  Atraumatic, Normocephalic  EYES: EOMI, PERRLA, conjunctiva and sclera clear  NECK: Supple, No JVD  CHEST/LUNG: Clear to auscultation bilaterally; No wheeze  HEART: Regular rate and rhythm; No murmurs, rubs, or gallops  ABDOMEN: Soft, Nontender, Nondistended; Bowel sounds present  EXTREMITIES:  2+ Peripheral Pulses, No clubbing, cyanosis, or edema  PSYCH: AAOx3  NEUROLOGY: non-focal  SKIN: No rashes or lesions    LABS:                        16.2   6.17  )-----------( 136      ( 28 Aug 2020 07:00 )             48.2     08-28    140  |  103  |  13  ----------------------------<  105<H>  4.2   |  26  |  0.67    Ca    8.5      28 Aug 2020 07:00  Phos  3.4     08-28  Mg     1.8     08-28                RADIOLOGY & ADDITIONAL TESTS:    Imaging Personally Reviewed:    Consultant(s) Notes Reviewed:      Care Discussed with Consultants/Other Providers: Patient is a 34y old  Male who presents with a chief complaint of Alcohol withdrawal (27 Aug 2020 10:01)      SUBJECTIVE / OVERNIGHT EVENTS: patient seen and examined by bedside, pt lying in bed with eyes closed, tremulous, says he is not ready to go to [facility] today as he is withdrawing , also reports sweating   CIWA 1- 2        MEDICATIONS  (STANDING):  enoxaparin Injectable 40 milliGRAM(s) SubCutaneous daily  folic acid 1 milliGRAM(s) Oral daily  gabapentin 300 milliGRAM(s) Oral three times a day  multivitamin 1 Tablet(s) Oral daily  QUEtiapine 100 milliGRAM(s) Oral at bedtime  sertraline 100 milliGRAM(s) Oral daily  thiamine IVPB 500 milliGRAM(s) IV Intermittent three times a day    MEDICATIONS  (PRN):  acetaminophen   Tablet .. 650 milliGRAM(s) Oral every 6 hours PRN Temp greater or equal to 38C (100.4F), Mild Pain (1 - 3), Moderate Pain (4 - 6)  cyclobenzaprine 5 milliGRAM(s) Oral three times a day PRN Muscle Spasm  hydrOXYzine hydrochloride 50 milliGRAM(s) Oral every 6 hours PRN Anxiety  LORazepam   Injectable 2 milliGRAM(s) IV Push every 1 hour PRN Symptom-triggered: each CIWA -Ar score 8 or GREATER  ondansetron Injectable 4 milliGRAM(s) IV Push every 4 hours PRN Nausea and/or Vomiting      Vital Signs Last 24 Hrs  T(C): 36.3 (28 Aug 2020 10:00), Max: 36.9 (27 Aug 2020 18:00)  T(F): 97.3 (28 Aug 2020 10:00), Max: 98.4 (27 Aug 2020 18:00)  HR: 51 (28 Aug 2020 10:00) (51 - 58)  BP: 114/71 (28 Aug 2020 10:00) (106/73 - 131/91)  BP(mean): --  RR: 17 (28 Aug 2020 10:00) (16 - 18)  SpO2: 97% (28 Aug 2020 10:00) (97% - 98%)  CAPILLARY BLOOD GLUCOSE        I&O's Summary      PHYSICAL EXAM:  GENERAL: NAD, well-developed  HEAD:  Atraumatic, Normocephalic  EYES: EOMI, PERRLA, conjunctiva and sclera clear  CHEST/LUNG: Clear to auscultation bilaterally; No wheeze  HEART: Regular rate and rhythm;  ABDOMEN: Soft, Nontender, Nondistended; Bowel sounds present  EXTREMITIES:  2+ Peripheral Pulses, No clubbing, cyanosis, or edema  PSYCH: AAOx3, appears depressed   NEUROLOGY: non-focal, slightly drowsy   SKIN: No rashes or lesions    LABS:                        16.2   6.17  )-----------( 136      ( 28 Aug 2020 07:00 )             48.2     08-28    140  |  103  |  13  ----------------------------<  105<H>  4.2   |  26  |  0.67    Ca    8.5      28 Aug 2020 07:00  Phos  3.4     08-28  Mg     1.8     08-28                RADIOLOGY & ADDITIONAL TESTS:    Imaging Personally Reviewed:    Consultant(s) Notes Reviewed:  psychiatry     Care Discussed with Consultants/Other Providers: psychiatry

## 2020-08-28 NOTE — PROGRESS NOTE ADULT - PROBLEM SELECTOR PLAN 1
- continue with symptom triggered protocol with PRN Ativan IV   - continue MVI, thiamine folic acid  - EtOH cessation counseling

## 2020-08-28 NOTE — PROGRESS NOTE BEHAVIORAL HEALTH - NSBHFUPINTERVALHXFT_PSY_A_CORE
Chart reviewed. Patient received two doses of PRN hydroxyzine for anxiety . Did not receive any PRN lorazepam for symptom triggered CIWA. HR;51-53, CIWA: 2-5, most recent score is 2.     Patient seen and evaluated.  Patient states that he has been feeling extremely depressed and  he continues to have suicidals thoughts with the plan to kill himself, stated " I want to drink until I die", pt. further stated that " I don't want to live anymore".   Patient denies that he will act on these thoughts while he is in the hospital. Mild tremors noted on exam. Pt. denies current acute psychotic sxs, denies HI. Patient is willing to go to [facility] for further treatment and signed the voluntary.   As per patricia, pt. had his breakfast, he had [place] toast and Orange juice  Patient provided contact information for aunt Jyothi at . (gave consent to talk to her) He states that she is not his real aunt but has known him since he was a baby and has been caring for him (both parents are dead).

## 2020-08-29 PROCEDURE — 99233 SBSQ HOSP IP/OBS HIGH 50: CPT

## 2020-08-29 RX ORDER — LANOLIN ALCOHOL/MO/W.PET/CERES
3 CREAM (GRAM) TOPICAL AT BEDTIME
Refills: 0 | Status: DISCONTINUED | OUTPATIENT
Start: 2020-08-29 | End: 2020-09-01

## 2020-08-29 RX ADMIN — GABAPENTIN 300 MILLIGRAM(S): 400 CAPSULE ORAL at 14:32

## 2020-08-29 RX ADMIN — Medication 3 MILLIGRAM(S): at 23:59

## 2020-08-29 RX ADMIN — Medication 50 MILLIGRAM(S): at 00:58

## 2020-08-29 RX ADMIN — CYCLOBENZAPRINE HYDROCHLORIDE 5 MILLIGRAM(S): 10 TABLET, FILM COATED ORAL at 16:58

## 2020-08-29 RX ADMIN — GABAPENTIN 300 MILLIGRAM(S): 400 CAPSULE ORAL at 21:13

## 2020-08-29 RX ADMIN — SERTRALINE 100 MILLIGRAM(S): 25 TABLET, FILM COATED ORAL at 09:35

## 2020-08-29 RX ADMIN — Medication 105 MILLIGRAM(S): at 21:13

## 2020-08-29 RX ADMIN — ENOXAPARIN SODIUM 40 MILLIGRAM(S): 100 INJECTION SUBCUTANEOUS at 14:30

## 2020-08-29 RX ADMIN — QUETIAPINE FUMARATE 100 MILLIGRAM(S): 200 TABLET, FILM COATED ORAL at 21:12

## 2020-08-29 RX ADMIN — GABAPENTIN 300 MILLIGRAM(S): 400 CAPSULE ORAL at 05:22

## 2020-08-29 RX ADMIN — Medication 1 MILLIGRAM(S): at 14:30

## 2020-08-29 RX ADMIN — Medication 105 MILLIGRAM(S): at 05:22

## 2020-08-29 RX ADMIN — Medication 105 MILLIGRAM(S): at 15:31

## 2020-08-29 RX ADMIN — Medication 1 TABLET(S): at 14:30

## 2020-08-29 NOTE — PROGRESS NOTE BEHAVIORAL HEALTH - NSBHCONSULTMEDS_PSY_A_CORE FT
seroquel 100 mg QHS  sertraline 100 mg QD (please give at 9AM as pt. prefers to take this in the morning)  gabapentin 300 mg TID  hold meds for severe sedation    Thiamine 500mg IV tid for 2-3 days.    Monitor closely for alcohol withdrawal sign and symptoms, consider restart Librium taper if patient started to show sxs of withdrawal, scoring high on CIWA or requiring multiple prns.

## 2020-08-29 NOTE — PROGRESS NOTE BEHAVIORAL HEALTH - NSBHFUPINTERVALHXFT_PSY_A_CORE
Chart reviewed. Patient received PRN hydroxyzine x1 for anxiety . Did not receive any PRN lorazepam for symptom triggered CIWA. HR;61.  CIWA: 3     Patient seen and evaluated, AAOX3, cooperative.  Patient continues to feel depressed and continues to have suicidals thoughts with the plan to kill himself, again reported that " I want to drink until I die".   Patient denies that he will act on these thoughts while he is in the hospital. very mild tremors noted on exam. Pt. stated that his tremors are improving. Pt. denies current acute psychotic sxs, denies HI. Patient is willing to go to [facility] for further treatment and signed the voluntary.   As per patricia, pt. had his breakfast.

## 2020-08-29 NOTE — PROGRESS NOTE ADULT - PROBLEM SELECTOR PLAN 3
Pt with suicidal ideation but stated it is likely to be exacerbated outside of the hospital. No other hallucinations. Evaluated by psych --> as per psych, "While patient is at high risk of suicide outside of hospital, he is at low risk of suicide while in the hospital"  - c/w gabapentin, seroquel, hydroxyzine, sertraline  - d/c 1:1 constant observation  - patient will require [facility] admission once determined not to be in active EtOH withdrawal  -  Psych f/u noted, case discussed , will hold discharge today as concern for withdrawal, will continue to monitor for s/s of withdrawal Pt with suicidal ideation but stated it is likely to be exacerbated outside of the hospital. No other hallucinations. Evaluated by psych --> as per psych, "While patient is at high risk of suicide outside of hospital, he is at low risk of suicide while in the hospital"  - c/w gabapentin, seroquel, hydroxyzine, sertraline  - patient will require [facility] admission once determined not to be in active EtOH withdrawal  -  Psych f/u noted, case discussed , will continue to monitor for s/s of withdrawal and start librium  taper if CIWA score increasing or patient requiring more frequent PRNs

## 2020-08-29 NOTE — PROGRESS NOTE ADULT - SUBJECTIVE AND OBJECTIVE BOX
Patient is a 34y old  Male who presents with a chief complaint of Alcohol withdrawal (28 Aug 2020 11:33)      SUBJECTIVE / OVERNIGHT EVENTS:    MEDICATIONS  (STANDING):  enoxaparin Injectable 40 milliGRAM(s) SubCutaneous daily  folic acid 1 milliGRAM(s) Oral daily  gabapentin 300 milliGRAM(s) Oral three times a day  multivitamin 1 Tablet(s) Oral daily  QUEtiapine 100 milliGRAM(s) Oral at bedtime  sertraline 100 milliGRAM(s) Oral daily  thiamine IVPB 500 milliGRAM(s) IV Intermittent three times a day    MEDICATIONS  (PRN):  acetaminophen   Tablet .. 650 milliGRAM(s) Oral every 6 hours PRN Temp greater or equal to 38C (100.4F), Mild Pain (1 - 3), Moderate Pain (4 - 6)  cyclobenzaprine 5 milliGRAM(s) Oral three times a day PRN Muscle Spasm  hydrOXYzine hydrochloride 50 milliGRAM(s) Oral every 6 hours PRN Anxiety  LORazepam   Injectable 2 milliGRAM(s) IV Push every 1 hour PRN Symptom-triggered: each CIWA -Ar score 8 or GREATER  ondansetron Injectable 4 milliGRAM(s) IV Push every 4 hours PRN Nausea and/or Vomiting      Vital Signs Last 24 Hrs  T(C): 36.7 (28 Aug 2020 21:40), Max: 36.7 (28 Aug 2020 13:56)  T(F): 98 (28 Aug 2020 21:40), Max: 98.1 (28 Aug 2020 13:56)  HR: 61 (28 Aug 2020 21:40) (51 - 77)  BP: 111/64 (29 Aug 2020 05:20) (104/67 - 118/75)  BP(mean): --  RR: 18 (29 Aug 2020 05:20) (17 - 18)  SpO2: 98% (29 Aug 2020 05:20) (96% - 100%)  CAPILLARY BLOOD GLUCOSE        I&O's Summary      PHYSICAL EXAM:  GENERAL: NAD, well-developed  HEAD:  Atraumatic, Normocephalic  EYES: EOMI, PERRLA, conjunctiva and sclera clear  NECK: Supple, No JVD  CHEST/LUNG: Clear to auscultation bilaterally; No wheeze  HEART: Regular rate and rhythm; No murmurs, rubs, or gallops  ABDOMEN: Soft, Nontender, Nondistended; Bowel sounds present  EXTREMITIES:  2+ Peripheral Pulses, No clubbing, cyanosis, or edema  PSYCH: AAOx3  NEUROLOGY: non-focal  SKIN: No rashes or lesions    LABS:                        16.2   6.17  )-----------( 136      ( 28 Aug 2020 07:00 )             48.2     08-28    140  |  103  |  13  ----------------------------<  105<H>  4.2   |  26  |  0.67    Ca    8.5      28 Aug 2020 07:00  Phos  3.4     08-28  Mg     1.8     08-28                RADIOLOGY & ADDITIONAL TESTS:    Imaging Personally Reviewed:    Consultant(s) Notes Reviewed:      Care Discussed with Consultants/Other Providers: Patient is a 34y old  Male who presents with a chief complaint of Alcohol withdrawal (28 Aug 2020 11:33)      SUBJECTIVE / OVERNIGHT EVENTS: patient seen and examined by bedside, pt sleeping, but arousable on verbal and tactile stimuli , c/o feeling tremulous still , no agitation as per nursing staff   CIWA score 3     MEDICATIONS  (STANDING):  enoxaparin Injectable 40 milliGRAM(s) SubCutaneous daily  folic acid 1 milliGRAM(s) Oral daily  gabapentin 300 milliGRAM(s) Oral three times a day  multivitamin 1 Tablet(s) Oral daily  QUEtiapine 100 milliGRAM(s) Oral at bedtime  sertraline 100 milliGRAM(s) Oral daily  thiamine IVPB 500 milliGRAM(s) IV Intermittent three times a day    MEDICATIONS  (PRN):  acetaminophen   Tablet .. 650 milliGRAM(s) Oral every 6 hours PRN Temp greater or equal to 38C (100.4F), Mild Pain (1 - 3), Moderate Pain (4 - 6)  cyclobenzaprine 5 milliGRAM(s) Oral three times a day PRN Muscle Spasm  hydrOXYzine hydrochloride 50 milliGRAM(s) Oral every 6 hours PRN Anxiety  LORazepam   Injectable 2 milliGRAM(s) IV Push every 1 hour PRN Symptom-triggered: each CIWA -Ar score 8 or GREATER  ondansetron Injectable 4 milliGRAM(s) IV Push every 4 hours PRN Nausea and/or Vomiting      Vital Signs Last 24 Hrs  T(C): 36.7 (28 Aug 2020 21:40), Max: 36.7 (28 Aug 2020 13:56)  T(F): 98 (28 Aug 2020 21:40), Max: 98.1 (28 Aug 2020 13:56)  HR: 61 (28 Aug 2020 21:40) (51 - 77)  BP: 111/64 (29 Aug 2020 05:20) (104/67 - 118/75)  BP(mean): --  RR: 18 (29 Aug 2020 05:20) (17 - 18)  SpO2: 98% (29 Aug 2020 05:20) (96% - 100%)  CAPILLARY BLOOD GLUCOSE        I&O's Summary    PHYSICAL EXAM:  GENERAL: NAD, well-developed  HEAD:  Atraumatic, Normocephalic  EYES: EOMI, PERRLA, conjunctiva and sclera clear  CHEST/LUNG: Clear to auscultation bilaterally; No wheeze  HEART: Regular rate and rhythm;  ABDOMEN: Soft, Nontender, Nondistended; Bowel sounds present  EXTREMITIES:  2+ Peripheral Pulses, No clubbing, cyanosis, or edema  NEUROLOGY: non-focal,, drowsy   SKIN: No rashes or lesions      LABS:                        16.2   6.17  )-----------( 136      ( 28 Aug 2020 07:00 )             48.2     08-28    140  |  103  |  13  ----------------------------<  105<H>  4.2   |  26  |  0.67    Ca    8.5      28 Aug 2020 07:00  Phos  3.4     08-28  Mg     1.8     08-28                RADIOLOGY & ADDITIONAL TESTS:    Imaging Personally Reviewed:    Consultant(s) Notes Reviewed:  psychiatry     Care Discussed with Consultants/Other Providers:

## 2020-08-29 NOTE — PROGRESS NOTE BEHAVIORAL HEALTH - OTHER
no fasciculations, very mild tremors noted, improved from yesterday in bed reporting SI with  plan to drink until death

## 2020-08-29 NOTE — PROGRESS NOTE ADULT - PROBLEM SELECTOR PLAN 5
lovenox for dvt ppx, dash diet    DISPO: Anticipate discharge in the next 1-2 days to [facility]

## 2020-08-30 LAB
ANION GAP SERPL CALC-SCNC: 11 MMO/L — SIGNIFICANT CHANGE UP (ref 7–14)
BUN SERPL-MCNC: 16 MG/DL — SIGNIFICANT CHANGE UP (ref 7–23)
CALCIUM SERPL-MCNC: 9.1 MG/DL — SIGNIFICANT CHANGE UP (ref 8.4–10.5)
CHLORIDE SERPL-SCNC: 102 MMOL/L — SIGNIFICANT CHANGE UP (ref 98–107)
CO2 SERPL-SCNC: 24 MMOL/L — SIGNIFICANT CHANGE UP (ref 22–31)
CREAT SERPL-MCNC: 0.75 MG/DL — SIGNIFICANT CHANGE UP (ref 0.5–1.3)
GLUCOSE SERPL-MCNC: 87 MG/DL — SIGNIFICANT CHANGE UP (ref 70–99)
MAGNESIUM SERPL-MCNC: 1.8 MG/DL — SIGNIFICANT CHANGE UP (ref 1.6–2.6)
PHOSPHATE SERPL-MCNC: 4.2 MG/DL — SIGNIFICANT CHANGE UP (ref 2.5–4.5)
POTASSIUM SERPL-MCNC: 4 MMOL/L — SIGNIFICANT CHANGE UP (ref 3.5–5.3)
POTASSIUM SERPL-SCNC: 4 MMOL/L — SIGNIFICANT CHANGE UP (ref 3.5–5.3)
SODIUM SERPL-SCNC: 137 MMOL/L — SIGNIFICANT CHANGE UP (ref 135–145)

## 2020-08-30 PROCEDURE — 99232 SBSQ HOSP IP/OBS MODERATE 35: CPT

## 2020-08-30 RX ADMIN — Medication 105 MILLIGRAM(S): at 05:38

## 2020-08-30 RX ADMIN — GABAPENTIN 300 MILLIGRAM(S): 400 CAPSULE ORAL at 21:49

## 2020-08-30 RX ADMIN — Medication 50 MILLIGRAM(S): at 19:45

## 2020-08-30 RX ADMIN — Medication 50 MILLIGRAM(S): at 10:50

## 2020-08-30 RX ADMIN — Medication 1 TABLET(S): at 13:16

## 2020-08-30 RX ADMIN — Medication 1 MILLIGRAM(S): at 13:16

## 2020-08-30 RX ADMIN — ENOXAPARIN SODIUM 40 MILLIGRAM(S): 100 INJECTION SUBCUTANEOUS at 13:16

## 2020-08-30 RX ADMIN — GABAPENTIN 300 MILLIGRAM(S): 400 CAPSULE ORAL at 05:38

## 2020-08-30 RX ADMIN — CYCLOBENZAPRINE HYDROCHLORIDE 5 MILLIGRAM(S): 10 TABLET, FILM COATED ORAL at 18:33

## 2020-08-30 RX ADMIN — GABAPENTIN 300 MILLIGRAM(S): 400 CAPSULE ORAL at 13:16

## 2020-08-30 RX ADMIN — QUETIAPINE FUMARATE 100 MILLIGRAM(S): 200 TABLET, FILM COATED ORAL at 21:50

## 2020-08-30 RX ADMIN — SERTRALINE 100 MILLIGRAM(S): 25 TABLET, FILM COATED ORAL at 13:16

## 2020-08-30 RX ADMIN — Medication 105 MILLIGRAM(S): at 13:17

## 2020-08-30 RX ADMIN — Medication 3 MILLIGRAM(S): at 21:50

## 2020-08-30 NOTE — PROGRESS NOTE ADULT - PROBLEM SELECTOR PLAN 3
Pt with suicidal ideation but stated it is likely to be exacerbated outside of the hospital. No other hallucinations. Evaluated by psych. Pt agreeable to voluntary inpatient psych admission.  - c/w gabapentin, seroquel, hydroxyzine, sertraline  - patient pending [facility] admission  -  Psych f/u noted --> continue constant observation

## 2020-08-30 NOTE — PROGRESS NOTE ADULT - PROBLEM SELECTOR PLAN 5
lovenox for dvt ppx, dash diet    DISPO: Anticipate discharge possibly tomorrow if bed available at [facility]

## 2020-08-30 NOTE — PROGRESS NOTE ADULT - ASSESSMENT
Mr. Ariza is a 35 yo man with hx of polysubstance abuse disorder, including cocaine use and alcohol abuse presenting with suicidal ideation and alcohol intoxication, subsequently admitted for psych eval and observation/management for alcohol withdrawal and suicidal ideation. Pt not in active alcohol withdrawal and pending bed for inpatient psych admission for severe depression and high risk for suicide attempt.

## 2020-08-30 NOTE — PROGRESS NOTE ADULT - PROBLEM SELECTOR PLAN 1
- discontinue with symptom triggered protocol with PRN Ativan IV   - continue MVI, thiamine folic acid  - EtOH cessation counseling

## 2020-08-30 NOTE — PROGRESS NOTE ADULT - SUBJECTIVE AND OBJECTIVE BOX
Patient is a 34y old  Male who presents with a chief complaint of Alcohol withdrawal (29 Aug 2020 07:44)    SUBJECTIVE / OVERNIGHT EVENTS:      MEDICATIONS  (STANDING):  enoxaparin Injectable 40 milliGRAM(s) SubCutaneous daily  folic acid 1 milliGRAM(s) Oral daily  gabapentin 300 milliGRAM(s) Oral three times a day  melatonin 3 milliGRAM(s) Oral at bedtime  multivitamin 1 Tablet(s) Oral daily  QUEtiapine 100 milliGRAM(s) Oral at bedtime  sertraline 100 milliGRAM(s) Oral daily    MEDICATIONS  (PRN):  acetaminophen   Tablet .. 650 milliGRAM(s) Oral every 6 hours PRN Temp greater or equal to 38C (100.4F), Mild Pain (1 - 3), Moderate Pain (4 - 6)  cyclobenzaprine 5 milliGRAM(s) Oral three times a day PRN Muscle Spasm  hydrOXYzine hydrochloride 50 milliGRAM(s) Oral every 6 hours PRN Anxiety  LORazepam   Injectable 2 milliGRAM(s) IV Push every 1 hour PRN Symptom-triggered: each CIWA -Ar score 8 or GREATER  ondansetron Injectable 4 milliGRAM(s) IV Push every 4 hours PRN Nausea and/or Vomiting      Vital Signs Last 24 Hrs  T(C): 36.4 (30 Aug 2020 14:04), Max: 36.9 (30 Aug 2020 05:58)  T(F): 97.5 (30 Aug 2020 14:04), Max: 98.4 (30 Aug 2020 05:58)  HR: 58 (30 Aug 2020 14:04) (58 - 64)  BP: 111/75 (30 Aug 2020 14:04) (111/75 - 123/82)  RR: 17 (30 Aug 2020 14:04) (17 - 18)  SpO2: 100% (30 Aug 2020 14:04) (100% - 100%)          PHYSICAL EXAM  GENERAL: NAD, well-developed  HEAD:  Atraumatic, Normocephalic  EYES: EOMI, PERRLA, conjunctiva and sclera clear  NECK: Supple, No JVD  CHEST/LUNG: Clear to auscultation bilaterally; No wheeze  HEART: Regular rate and rhythm; No murmurs, rubs, or gallops  ABDOMEN: Soft, Nontender, Nondistended; Bowel sounds present  EXTREMITIES:  2+ Peripheral Pulses, No clubbing, cyanosis, or edema  PSYCH: AAOx3  SKIN: No rashes or lesions    LABS:    08-30    137  |  102  |  16  ----------------------------<  87  4.0   |  24  |  0.75    Ca    9.1      30 Aug 2020 05:23  Phos  4.2     08-30  Mg     1.8     08-30                RADIOLOGY & ADDITIONAL TESTS:    Imaging Personally Reviewed:  Consultant(s) Notes Reviewed:    Care Discussed with Consultants/Other Providers: Patient is a 34y old  Male who presents with a chief complaint of Alcohol withdrawal (29 Aug 2020 07:44)    SUBJECTIVE / OVERNIGHT EVENTS:  Patient complaining of insomnia and saying Seroquel does not help him sleep. He otherwise feels okay and without current suicidal ideation. No chest pain, SOB, n/v or abdominal pain. Normal urinary and bowel habits.    MEDICATIONS  (STANDING):  enoxaparin Injectable 40 milliGRAM(s) SubCutaneous daily  folic acid 1 milliGRAM(s) Oral daily  gabapentin 300 milliGRAM(s) Oral three times a day  melatonin 3 milliGRAM(s) Oral at bedtime  multivitamin 1 Tablet(s) Oral daily  QUEtiapine 100 milliGRAM(s) Oral at bedtime  sertraline 100 milliGRAM(s) Oral daily    MEDICATIONS  (PRN):  acetaminophen   Tablet .. 650 milliGRAM(s) Oral every 6 hours PRN Temp greater or equal to 38C (100.4F), Mild Pain (1 - 3), Moderate Pain (4 - 6)  cyclobenzaprine 5 milliGRAM(s) Oral three times a day PRN Muscle Spasm  hydrOXYzine hydrochloride 50 milliGRAM(s) Oral every 6 hours PRN Anxiety  LORazepam   Injectable 2 milliGRAM(s) IV Push every 1 hour PRN Symptom-triggered: each CIWA -Ar score 8 or GREATER  ondansetron Injectable 4 milliGRAM(s) IV Push every 4 hours PRN Nausea and/or Vomiting      Vital Signs Last 24 Hrs  T(C): 36.4 (30 Aug 2020 14:04), Max: 36.9 (30 Aug 2020 05:58)  T(F): 97.5 (30 Aug 2020 14:04), Max: 98.4 (30 Aug 2020 05:58)  HR: 58 (30 Aug 2020 14:04) (58 - 64)  BP: 111/75 (30 Aug 2020 14:04) (111/75 - 123/82)  RR: 17 (30 Aug 2020 14:04) (17 - 18)  SpO2: 100% (30 Aug 2020 14:04) (100% - 100%)          PHYSICAL EXAM  GENERAL: NAD, well-developed  CHEST/LUNG: Clear to auscultation bilaterally; No wheeze  HEART: Regular rate and rhythm; No murmurs, rubs, or gallops  ABDOMEN: Soft, Nontender, Nondistended; Bowel sounds present  EXTREMITIES:  2+ Peripheral Pulses, No clubbing, cyanosis, or edema  PSYCH: AAOx3, calm, cooperative      LABS:    08-30    137  |  102  |  16  ----------------------------<  87  4.0   |  24  |  0.75    Ca    9.1      30 Aug 2020 05:23  Phos  4.2     08-30  Mg     1.8     08-30          Consultant(s) Notes Reviewed:  yes  Care Discussed with Consultants/Other Providers:

## 2020-08-31 LAB
ANION GAP SERPL CALC-SCNC: 16 MMO/L — HIGH (ref 7–14)
BUN SERPL-MCNC: 16 MG/DL — SIGNIFICANT CHANGE UP (ref 7–23)
CALCIUM SERPL-MCNC: 9.4 MG/DL — SIGNIFICANT CHANGE UP (ref 8.4–10.5)
CHLORIDE SERPL-SCNC: 96 MMOL/L — LOW (ref 98–107)
CO2 SERPL-SCNC: 25 MMOL/L — SIGNIFICANT CHANGE UP (ref 22–31)
CREAT SERPL-MCNC: 0.72 MG/DL — SIGNIFICANT CHANGE UP (ref 0.5–1.3)
GLUCOSE SERPL-MCNC: 57 MG/DL — LOW (ref 70–99)
HCT VFR BLD CALC: 49.5 % — SIGNIFICANT CHANGE UP (ref 39–50)
HGB BLD-MCNC: 16.4 G/DL — SIGNIFICANT CHANGE UP (ref 13–17)
MAGNESIUM SERPL-MCNC: 1.9 MG/DL — SIGNIFICANT CHANGE UP (ref 1.6–2.6)
MCHC RBC-ENTMCNC: 33.1 % — SIGNIFICANT CHANGE UP (ref 32–36)
MCHC RBC-ENTMCNC: 33.3 PG — SIGNIFICANT CHANGE UP (ref 27–34)
MCV RBC AUTO: 100.6 FL — HIGH (ref 80–100)
NRBC # FLD: 0 K/UL — SIGNIFICANT CHANGE UP (ref 0–0)
PHOSPHATE SERPL-MCNC: 2.9 MG/DL — SIGNIFICANT CHANGE UP (ref 2.5–4.5)
PLATELET # BLD AUTO: 163 K/UL — SIGNIFICANT CHANGE UP (ref 150–400)
PMV BLD: 9.3 FL — SIGNIFICANT CHANGE UP (ref 7–13)
POTASSIUM SERPL-MCNC: 3.9 MMOL/L — SIGNIFICANT CHANGE UP (ref 3.5–5.3)
POTASSIUM SERPL-SCNC: 3.9 MMOL/L — SIGNIFICANT CHANGE UP (ref 3.5–5.3)
RBC # BLD: 4.92 M/UL — SIGNIFICANT CHANGE UP (ref 4.2–5.8)
RBC # FLD: 13.4 % — SIGNIFICANT CHANGE UP (ref 10.3–14.5)
SODIUM SERPL-SCNC: 137 MMOL/L — SIGNIFICANT CHANGE UP (ref 135–145)
WBC # BLD: 8.49 K/UL — SIGNIFICANT CHANGE UP (ref 3.8–10.5)
WBC # FLD AUTO: 8.49 K/UL — SIGNIFICANT CHANGE UP (ref 3.8–10.5)

## 2020-08-31 PROCEDURE — 99232 SBSQ HOSP IP/OBS MODERATE 35: CPT

## 2020-08-31 PROCEDURE — 99233 SBSQ HOSP IP/OBS HIGH 50: CPT

## 2020-08-31 RX ORDER — LANOLIN ALCOHOL/MO/W.PET/CERES
1 CREAM (GRAM) TOPICAL
Qty: 0 | Refills: 0 | DISCHARGE
Start: 2020-08-31

## 2020-08-31 RX ORDER — CYCLOBENZAPRINE HYDROCHLORIDE 10 MG/1
1 TABLET, FILM COATED ORAL
Qty: 0 | Refills: 0 | DISCHARGE
Start: 2020-08-31

## 2020-08-31 RX ORDER — FOLIC ACID 0.8 MG
1 TABLET ORAL
Qty: 0 | Refills: 0 | DISCHARGE
Start: 2020-08-31

## 2020-08-31 RX ORDER — ACETAMINOPHEN 500 MG
2 TABLET ORAL
Qty: 0 | Refills: 0 | DISCHARGE
Start: 2020-08-31

## 2020-08-31 RX ADMIN — Medication 2 MILLIGRAM(S): at 09:01

## 2020-08-31 RX ADMIN — GABAPENTIN 300 MILLIGRAM(S): 400 CAPSULE ORAL at 21:02

## 2020-08-31 RX ADMIN — CYCLOBENZAPRINE HYDROCHLORIDE 5 MILLIGRAM(S): 10 TABLET, FILM COATED ORAL at 17:57

## 2020-08-31 RX ADMIN — SERTRALINE 100 MILLIGRAM(S): 25 TABLET, FILM COATED ORAL at 13:01

## 2020-08-31 RX ADMIN — ENOXAPARIN SODIUM 40 MILLIGRAM(S): 100 INJECTION SUBCUTANEOUS at 13:01

## 2020-08-31 RX ADMIN — Medication 1 MILLIGRAM(S): at 13:01

## 2020-08-31 RX ADMIN — QUETIAPINE FUMARATE 100 MILLIGRAM(S): 200 TABLET, FILM COATED ORAL at 21:02

## 2020-08-31 RX ADMIN — Medication 1 TABLET(S): at 13:02

## 2020-08-31 RX ADMIN — Medication 50 MILLIGRAM(S): at 17:59

## 2020-08-31 RX ADMIN — Medication 50 MILLIGRAM(S): at 02:02

## 2020-08-31 RX ADMIN — GABAPENTIN 300 MILLIGRAM(S): 400 CAPSULE ORAL at 13:01

## 2020-08-31 RX ADMIN — GABAPENTIN 300 MILLIGRAM(S): 400 CAPSULE ORAL at 05:11

## 2020-08-31 RX ADMIN — Medication 3 MILLIGRAM(S): at 21:02

## 2020-08-31 NOTE — PROGRESS NOTE ADULT - SUBJECTIVE AND OBJECTIVE BOX
Patient is a 34y old  Male who presents with a chief complaint of Alcohol withdrawal and suicidal ideation (30 Aug 2020 15:25)      SUBJECTIVE / OVERNIGHT EVENTS:    MEDICATIONS  (STANDING):  enoxaparin Injectable 40 milliGRAM(s) SubCutaneous daily  folic acid 1 milliGRAM(s) Oral daily  gabapentin 300 milliGRAM(s) Oral three times a day  melatonin 3 milliGRAM(s) Oral at bedtime  multivitamin 1 Tablet(s) Oral daily  QUEtiapine 100 milliGRAM(s) Oral at bedtime  sertraline 100 milliGRAM(s) Oral daily    MEDICATIONS  (PRN):  acetaminophen   Tablet .. 650 milliGRAM(s) Oral every 6 hours PRN Temp greater or equal to 38C (100.4F), Mild Pain (1 - 3), Moderate Pain (4 - 6)  cyclobenzaprine 5 milliGRAM(s) Oral three times a day PRN Muscle Spasm  hydrOXYzine hydrochloride 50 milliGRAM(s) Oral every 6 hours PRN Anxiety  ondansetron Injectable 4 milliGRAM(s) IV Push every 4 hours PRN Nausea and/or Vomiting      Vital Signs Last 24 Hrs  T(C): 36.3 (31 Aug 2020 08:56), Max: 36.7 (31 Aug 2020 05:20)  T(F): 97.4 (31 Aug 2020 08:56), Max: 98.1 (31 Aug 2020 05:20)  HR: 75 (31 Aug 2020 08:56) (58 - 75)  BP: 118/69 (31 Aug 2020 08:56) (110/73 - 122/62)  BP(mean): --  RR: 18 (31 Aug 2020 08:56) (16 - 18)  SpO2: 100% (31 Aug 2020 08:56) (100% - 100%)  CAPILLARY BLOOD GLUCOSE        I&O's Summary      PHYSICAL EXAM:  GENERAL: NAD, well-developed  HEAD:  Atraumatic, Normocephalic  EYES: EOMI, PERRLA, conjunctiva and sclera clear  NECK: Supple, No JVD  CHEST/LUNG: Clear to auscultation bilaterally; No wheeze  HEART: Regular rate and rhythm; No murmurs, rubs, or gallops  ABDOMEN: Soft, Nontender, Nondistended; Bowel sounds present  EXTREMITIES:  2+ Peripheral Pulses, No clubbing, cyanosis, or edema  PSYCH: AAOx3  NEUROLOGY: non-focal  SKIN: No rashes or lesions    LABS:    08-30    137  |  102  |  16  ----------------------------<  87  4.0   |  24  |  0.75    Ca    9.1      30 Aug 2020 05:23  Phos  4.2     08-30  Mg     1.8     08-30                RADIOLOGY & ADDITIONAL TESTS:    Imaging Personally Reviewed:    Consultant(s) Notes Reviewed:      Care Discussed with Consultants/Other Providers: Patient is a 34y old  Male who presents with a chief complaint of Alcohol withdrawal and suicidal ideation (30 Aug 2020 15:25)      SUBJECTIVE / OVERNIGHT EVENTS: patient seen and examined by bedside,  pt sedated as was agitated in the morning for a shower and had received ativan, opens eyes on calling name but goes back to sleep  , no acute distress noted        MEDICATIONS  (STANDING):  enoxaparin Injectable 40 milliGRAM(s) SubCutaneous daily  folic acid 1 milliGRAM(s) Oral daily  gabapentin 300 milliGRAM(s) Oral three times a day  melatonin 3 milliGRAM(s) Oral at bedtime  multivitamin 1 Tablet(s) Oral daily  QUEtiapine 100 milliGRAM(s) Oral at bedtime  sertraline 100 milliGRAM(s) Oral daily    MEDICATIONS  (PRN):  acetaminophen   Tablet .. 650 milliGRAM(s) Oral every 6 hours PRN Temp greater or equal to 38C (100.4F), Mild Pain (1 - 3), Moderate Pain (4 - 6)  cyclobenzaprine 5 milliGRAM(s) Oral three times a day PRN Muscle Spasm  hydrOXYzine hydrochloride 50 milliGRAM(s) Oral every 6 hours PRN Anxiety  ondansetron Injectable 4 milliGRAM(s) IV Push every 4 hours PRN Nausea and/or Vomiting      Vital Signs Last 24 Hrs  T(C): 36.3 (31 Aug 2020 08:56), Max: 36.7 (31 Aug 2020 05:20)  T(F): 97.4 (31 Aug 2020 08:56), Max: 98.1 (31 Aug 2020 05:20)  HR: 75 (31 Aug 2020 08:56) (58 - 75)  BP: 118/69 (31 Aug 2020 08:56) (110/73 - 122/62)  BP(mean): --  RR: 18 (31 Aug 2020 08:56) (16 - 18)  SpO2: 100% (31 Aug 2020 08:56) (100% - 100%)  CAPILLARY BLOOD GLUCOSE        I&O's Summary        PHYSICAL EXAM  GENERAL: NAD, well-developed  CHEST/LUNG: Clear to auscultation bilaterally; No wheeze  HEART: Regular rate and rhythm; No murmurs, rubs, or gallops  ABDOMEN: Soft, Nontender, Nondistended; Bowel sounds present  EXTREMITIES:  2+ Peripheral Pulses, No clubbing, cyanosis, or edema  PSYCH:  sleeping, calm       LABS:    08-30    137  |  102  |  16  ----------------------------<  87  4.0   |  24  |  0.75    Ca    9.1      30 Aug 2020 05:23  Phos  4.2     08-30  Mg     1.8     08-30                RADIOLOGY & ADDITIONAL TESTS:    Imaging Personally Reviewed:    Consultant(s) Notes Reviewed:  psychiatry     Care Discussed with Consultants/Other Providers: psychiatry

## 2020-08-31 NOTE — PROGRESS NOTE ADULT - PROBLEM SELECTOR PLAN 3
Pt with suicidal ideation but stated it is likely to be exacerbated outside of the hospital. No other hallucinations. Evaluated by psych. Pt agreeable to voluntary inpatient psych admission.  - c/w gabapentin, seroquel, hydroxyzine, sertraline  - patient pending [facility] admission  -  Psych f/u noted --> continue constant observation

## 2020-08-31 NOTE — PROVIDER CONTACT NOTE (OTHER) - ASSESSMENT
pt in bed, with PCA at bedside, pt restless from no sleep and becoming more agitated as PCA and RN reorient pt, pt states hes feels like his heart is racing and has anxiety

## 2020-08-31 NOTE — PROVIDER CONTACT NOTE (OTHER) - SITUATION
pt in bed 1:1 constant observation, pt reports being unable to sleep, pt restless frequently forgetful of where he is, able to reorient. as pt keeps asking questions, gradually becomes more agitated.

## 2020-08-31 NOTE — PROGRESS NOTE ADULT - PROBLEM SELECTOR PLAN 5
lovenox for dvt ppx, dash diet    DISPO: Anticipate discharge today if bed available at [facility]

## 2020-08-31 NOTE — PROGRESS NOTE BEHAVIORAL HEALTH - NSBHFUPSUICINTERVALFT_PSY_A_CORE
patient said that if he weren't in the hospital he would drink until he 

## 2020-08-31 NOTE — PROGRESS NOTE BEHAVIORAL HEALTH - NSBHFUPINTERVALHXFT_PSY_A_CORE
patient sleepy on exam, awakens briefly, was agitated last night wanted to shower - states today that he still is suicidal, still amenable to going to [facility]

## 2020-09-01 ENCOUNTER — INPATIENT (INPATIENT)
Facility: HOSPITAL | Age: 35
LOS: 2 days | Discharge: ANOTHER HOSPITAL | End: 2020-09-04
Attending: PSYCHIATRY & NEUROLOGY | Admitting: PSYCHIATRY & NEUROLOGY
Payer: COMMERCIAL

## 2020-09-01 VITALS
RESPIRATION RATE: 18 BRPM | DIASTOLIC BLOOD PRESSURE: 57 MMHG | HEART RATE: 76 BPM | SYSTOLIC BLOOD PRESSURE: 113 MMHG | TEMPERATURE: 98 F | OXYGEN SATURATION: 97 %

## 2020-09-01 VITALS — WEIGHT: 149.91 LBS | TEMPERATURE: 97 F | HEIGHT: 68 IN

## 2020-09-01 DIAGNOSIS — F32.9 MAJOR DEPRESSIVE DISORDER, SINGLE EPISODE, UNSPECIFIED: ICD-10-CM

## 2020-09-01 LAB
AMPHET UR-MCNC: NEGATIVE — SIGNIFICANT CHANGE UP
APPEARANCE UR: CLEAR — SIGNIFICANT CHANGE UP
BARBITURATES UR SCN-MCNC: NEGATIVE — SIGNIFICANT CHANGE UP
BENZODIAZ UR-MCNC: POSITIVE — SIGNIFICANT CHANGE UP
BILIRUB UR-MCNC: NEGATIVE — SIGNIFICANT CHANGE UP
BLOOD UR QL VISUAL: NEGATIVE — SIGNIFICANT CHANGE UP
CANNABINOIDS UR-MCNC: NEGATIVE — SIGNIFICANT CHANGE UP
COCAINE METAB.OTHER UR-MCNC: NEGATIVE — SIGNIFICANT CHANGE UP
COLOR SPEC: YELLOW — SIGNIFICANT CHANGE UP
GLUCOSE UR-MCNC: NEGATIVE — SIGNIFICANT CHANGE UP
KETONES UR-MCNC: NEGATIVE — SIGNIFICANT CHANGE UP
LEUKOCYTE ESTERASE UR-ACNC: NEGATIVE — SIGNIFICANT CHANGE UP
METHADONE UR-MCNC: NEGATIVE — SIGNIFICANT CHANGE UP
NITRITE UR-MCNC: NEGATIVE — SIGNIFICANT CHANGE UP
OPIATES UR-MCNC: NEGATIVE — SIGNIFICANT CHANGE UP
OXYCODONE UR-MCNC: NEGATIVE — SIGNIFICANT CHANGE UP
PCP UR-MCNC: NEGATIVE — SIGNIFICANT CHANGE UP
PH UR: 6.5 — SIGNIFICANT CHANGE UP (ref 5–8)
PROT UR-MCNC: NEGATIVE — SIGNIFICANT CHANGE UP
SP GR SPEC: 1.02 — SIGNIFICANT CHANGE UP (ref 1–1.04)
UROBILINOGEN FLD QL: SIGNIFICANT CHANGE UP

## 2020-09-01 PROCEDURE — 99233 SBSQ HOSP IP/OBS HIGH 50: CPT

## 2020-09-01 PROCEDURE — 99222 1ST HOSP IP/OBS MODERATE 55: CPT | Mod: GC

## 2020-09-01 PROCEDURE — 99239 HOSP IP/OBS DSCHRG MGMT >30: CPT

## 2020-09-01 RX ORDER — FOLIC ACID 0.8 MG
1 TABLET ORAL DAILY
Refills: 0 | Status: DISCONTINUED | OUTPATIENT
Start: 2020-09-01 | End: 2020-09-04

## 2020-09-01 RX ORDER — HALOPERIDOL DECANOATE 100 MG/ML
5 INJECTION INTRAMUSCULAR ONCE
Refills: 0 | Status: DISCONTINUED | OUTPATIENT
Start: 2020-09-01 | End: 2020-09-04

## 2020-09-01 RX ORDER — DIPHENHYDRAMINE HCL 50 MG
50 CAPSULE ORAL ONCE
Refills: 0 | Status: DISCONTINUED | OUTPATIENT
Start: 2020-09-01 | End: 2020-09-04

## 2020-09-01 RX ORDER — HYDROXYZINE HCL 10 MG
50 TABLET ORAL EVERY 6 HOURS
Refills: 0 | Status: DISCONTINUED | OUTPATIENT
Start: 2020-09-01 | End: 2020-09-04

## 2020-09-01 RX ORDER — LANOLIN ALCOHOL/MO/W.PET/CERES
3 CREAM (GRAM) TOPICAL AT BEDTIME
Refills: 0 | Status: DISCONTINUED | OUTPATIENT
Start: 2020-09-01 | End: 2020-09-04

## 2020-09-01 RX ORDER — SERTRALINE 25 MG/1
100 TABLET, FILM COATED ORAL DAILY
Refills: 0 | Status: DISCONTINUED | OUTPATIENT
Start: 2020-09-01 | End: 2020-09-04

## 2020-09-01 RX ORDER — GABAPENTIN 400 MG/1
300 CAPSULE ORAL THREE TIMES A DAY
Refills: 0 | Status: DISCONTINUED | OUTPATIENT
Start: 2020-09-01 | End: 2020-09-04

## 2020-09-01 RX ORDER — QUETIAPINE FUMARATE 200 MG/1
100 TABLET, FILM COATED ORAL AT BEDTIME
Refills: 0 | Status: DISCONTINUED | OUTPATIENT
Start: 2020-09-01 | End: 2020-09-03

## 2020-09-01 RX ORDER — ACETAMINOPHEN 500 MG
650 TABLET ORAL EVERY 6 HOURS
Refills: 0 | Status: DISCONTINUED | OUTPATIENT
Start: 2020-09-01 | End: 2020-09-04

## 2020-09-01 RX ADMIN — Medication 1 TABLET(S): at 11:16

## 2020-09-01 RX ADMIN — GABAPENTIN 300 MILLIGRAM(S): 400 CAPSULE ORAL at 13:37

## 2020-09-01 RX ADMIN — GABAPENTIN 300 MILLIGRAM(S): 400 CAPSULE ORAL at 20:40

## 2020-09-01 RX ADMIN — Medication 50 MILLIGRAM(S): at 20:40

## 2020-09-01 RX ADMIN — ONDANSETRON 4 MILLIGRAM(S): 8 TABLET, FILM COATED ORAL at 09:11

## 2020-09-01 RX ADMIN — Medication 1 MILLIGRAM(S): at 13:36

## 2020-09-01 RX ADMIN — GABAPENTIN 300 MILLIGRAM(S): 400 CAPSULE ORAL at 05:22

## 2020-09-01 RX ADMIN — QUETIAPINE FUMARATE 100 MILLIGRAM(S): 200 TABLET, FILM COATED ORAL at 20:40

## 2020-09-01 RX ADMIN — Medication 3 MILLIGRAM(S): at 20:40

## 2020-09-01 RX ADMIN — SERTRALINE 100 MILLIGRAM(S): 25 TABLET, FILM COATED ORAL at 13:37

## 2020-09-01 RX ADMIN — Medication 1 MILLIGRAM(S): at 11:16

## 2020-09-01 RX ADMIN — Medication 50 MILLIGRAM(S): at 03:37

## 2020-09-01 RX ADMIN — ENOXAPARIN SODIUM 40 MILLIGRAM(S): 100 INJECTION SUBCUTANEOUS at 11:16

## 2020-09-01 NOTE — PROGRESS NOTE BEHAVIORAL HEALTH - THOUGHT CONTENT
Hopelessness/Suicidality/Other
Suicidality/Other

## 2020-09-01 NOTE — PROGRESS NOTE BEHAVIORAL HEALTH - NSBHFUPREASONCONS_PSY_A_CORE
depression/alcohol
suicidality
suicidality/depression/med management/alcohol
suicidality/med management/alcohol
suicidality

## 2020-09-01 NOTE — PROGRESS NOTE BEHAVIORAL HEALTH - NSBHFUPINTERVALCCFT_PSY_A_CORE
"I am feeling depressed"
"I feel terrible."
suicidal
"I just don't want to be here anymore"
I'm still suicidal"

## 2020-09-01 NOTE — PROGRESS NOTE ADULT - PROBLEM SELECTOR PLAN 5
lovenox for dvt ppx, dash diet    DISPO: Anticipate discharge today if bed available at [facility]

## 2020-09-01 NOTE — PROGRESS NOTE BEHAVIORAL HEALTH - NSBHCHARTREVIEWVS_PSY_A_CORE FT
Vital Signs Last 24 Hrs  T(C): 36.3 (31 Aug 2020 08:56), Max: 36.7 (31 Aug 2020 05:20)  T(F): 97.4 (31 Aug 2020 08:56), Max: 98.1 (31 Aug 2020 05:20)  HR: 75 (31 Aug 2020 08:56) (58 - 75)  BP: 118/69 (31 Aug 2020 08:56) (110/73 - 122/62)  BP(mean): --  RR: 18 (31 Aug 2020 08:56) (16 - 18)  SpO2: 100% (31 Aug 2020 08:56) (100% - 100%)
ICU Vital Signs Last 24 Hrs  T(C): 36.7 (28 Aug 2020 21:40), Max: 36.7 (28 Aug 2020 13:56)  T(F): 98 (28 Aug 2020 21:40), Max: 98.1 (28 Aug 2020 13:56)  HR: 61 (28 Aug 2020 21:40) (51 - 77)  BP: 111/64 (29 Aug 2020 05:20) (104/67 - 118/75)  BP(mean): --  ABP: --  ABP(mean): --  RR: 18 (29 Aug 2020 05:20) (17 - 18)  SpO2: 98% (29 Aug 2020 05:20) (96% - 100%)
T(C): 36.3 (27 Aug 2020 10:29), Max: 37 (27 Aug 2020 00:23)  T(F): 97.4 (27 Aug 2020 10:29), Max: 98.6 (27 Aug 2020 00:23)  HR: 49 (27 Aug 2020 10:29) (47 - 77)  BP: 114/78 (27 Aug 2020 10:29) (107/72 - 125/85)  RR: 16 (27 Aug 2020 10:29) (16 - 18)  SpO2: 98% (27 Aug 2020 10:29) (96% - 100%)
Vital Signs Last 24 Hrs  T(C): 36.4 (01 Sep 2020 13:02), Max: 36.5 (31 Aug 2020 19:12)  T(F): 97.5 (01 Sep 2020 13:02), Max: 97.7 (31 Aug 2020 19:12)  HR: 76 (01 Sep 2020 13:02) (63 - 76)  BP: 113/57 (01 Sep 2020 13:02) (104/64 - 114/71)  BP(mean): --  RR: 18 (01 Sep 2020 13:02) (18 - 18)  SpO2: 97% (01 Sep 2020 13:02) (96% - 99%)
ICU Vital Signs Last 24 Hrs  T(C): 36.3 (28 Aug 2020 10:00), Max: 36.9 (27 Aug 2020 18:00)  T(F): 97.3 (28 Aug 2020 10:00), Max: 98.4 (27 Aug 2020 18:00)  HR: 51 (28 Aug 2020 10:00) (51 - 58)  BP: 114/71 (28 Aug 2020 10:00) (106/73 - 131/91)  BP(mean): --  ABP: --  ABP(mean): --  RR: 17 (28 Aug 2020 10:00) (16 - 18)  SpO2: 97% (28 Aug 2020 10:00) (97% - 98%)

## 2020-09-01 NOTE — PROGRESS NOTE BEHAVIORAL HEALTH - SUMMARY
33 y/o M, single, non-domiciled, unemployed, s/p court mandated rehab in 2012 for cocaine arrest, recent psych hospitalization for EtOH abuse, depression and suicidality. Patient now presents with worsening depression, suicidal threats, EtOH abuse.     Patient p/w EtOH abuse in context of depression, medication noncompliance, made suicidal statements with aunt, recently admitted to [facility] for 2x suicide attempts did not f/u with care thereafter. Currently in EtOH withdrawal. Patient endorses current suicidal ideation, intent, and plan (with recent suicidal behavior), as well as depressed mood. Given patient somnolence and no need for lorazepam PRN on symptom triggered, continue to observe patient. May restart Librium taper if somnolence improves.    8/31: Patient sleepy but still suicidal, needs [facility] admission, 9.13 in chart voluntary signed.     PLAN:  1-See below   2-pt. is high risk for self harm and will need an inpatient admission for further treatment and stabilization once he is medically stable.  Pt. signed the voluntary, placed in chart    Will follow
33 y/o M, single, non-domiciled, unemployed, s/p court mandated rehab in 2012 for cocaine arrest, recent psych hospitalization for EtOH abuse, depression and suicidality. Patient now presents with worsening depression, suicidal threats, EtOH abuse.     Patient p/w EtOH abuse in context of depression, medication noncompliance, made suicidal statements with aunt, recently admitted to [facility] for 2x suicide attempts did not f/u with care thereafter. Currently in EtOH withdrawal. Patient endorses current suicidal ideation, intent, and plan (with recent suicidal behavior), as well as depressed mood. Given patient somnolence and no need for lorazepam PRN on symptom triggered, continue to observe patient. May restart Librium taper if somnolence improves.    8/31: Patient irritable but still endorsing SI, amenable to [facility] voluntary admission despite not going to the unit he initially wanted to go to. OK for patient to change into his regular clothes as long as there are no sharp objects, drawstrings with which to harm himself etc in them. Voluntary 9.13 legals in chart. Cannot leave AMA.     PLAN:  1-See below   2-pt. is high risk for self harm and will need an inpatient admission for further treatment and stabilization once he is medically stable.  Pt. signed the voluntary, placed in chart    Will follow
33 y/o M, single, non-domiciled, unemployed, s/p court mandated rehab in 2012 for cocaine arrest, recent psych hospitalization for EtOH abuse, depression and suicidality. Patient now presents with worsening depression, suicidal threats, EtOH abuse.     Patient p/w EtOH abuse in context of depression, medication noncompliance, made suicidal statements with aunt, recently admitted to [facility] for 2x suicide attempts did not f/u with care thereafter. Currently in EtOH withdrawal. Patient endorses current suicidal ideation, intent, and plan (with recent suicidal behavior), as well as depressed mood. Given patient somnolence and no need for lorazepam PRN on symptom triggered, continue to observe patient. May restart Librium taper if somnolence improves.    8/28; Patient AAOX3, tremors noted on exam, remains severely depressed, hopeless, and endorsing suicidal ideation with plans, denies acute psychotic sxs, denies HI. Given worsening depressive sxs, SI with plans, h/o SA and concerning collateral , pt. is high risk for self harm and will need an inpatient admission for further treatment and stabilization once he is medically stable.      8/29: Pt. seen, AAOX3, cooperative, tremors improved from yesterday, continues to feel depressed and endorsing suicidal ideation with plans, denies acute psychotic sxs, denies HI. Given worsening depressive sxs, SI with plans, h/o SA and concerning collateral , pt. is high risk for self harm and will need an inpatient admission for further treatment and stabilization once he is medically stable.      PLAN:  1-See below   2-pt. is high risk for self harm and will need an inpatient admission for further treatment and stabilization once he is medically stable.  Pt. signed the voluntary, placed in chart    Will follow
35 y/o M, single, non-domiciled, unemployed, s/p court mandated rehab in 2012 for cocaine arrest, recent psych hospitalization for EtOH abuse, depression and suicidality. Patient now presents with worsening depression, suicidal threats, EtOH abuse.     Patient p/w EtOH abuse in context of depression, medication noncompliance, made suicidal statements with aunt, recently admitted to [facility] for 2x suicide attempts did not f/u with care thereafter. Currently in EtOH withdrawal. Patient endorses current suicidal ideation, intent, and plan (with recent suicidal behavior), as well as depressed mood. Given patient somnolence and no need for lorazepam PRN on symptom triggered, continue to observe patient. May restart Librium taper if somnolence improves.
35 y/o M, single, non-domiciled, unemployed, s/p court mandated rehab in 2012 for cocaine arrest, recent psych hospitalization for EtOH abuse, depression and suicidality. Patient now presents with worsening depression, suicidal threats, EtOH abuse.     Patient p/w EtOH abuse in context of depression, medication noncompliance, made suicidal statements with aunt, recently admitted to [facility] for 2x suicide attempts did not f/u with care thereafter. Currently in EtOH withdrawal. Patient endorses current suicidal ideation, intent, and plan (with recent suicidal behavior), as well as depressed mood. Given patient somnolence and no need for lorazepam PRN on symptom triggered, continue to observe patient. May restart Librium taper if somnolence improves.    8/28; Patient AAOX3, tremors noted on exam, remains severely depressed, hopeless, and endorsing suicidal ideation with plans, denies acute psychotic sxs, denies HI. Given worsening depressive sxs, SI with plans, h/o SA and concerning collateral , pt. is high risk for self harm and will need an inpatient admission for further treatment and stabilization once he is medically stable.      PLAN:  1-See below   2-pt. is high risk for self harm and will need an inpatient admission for further treatment and stabilization once he is medically stable.  Pt. signed the voluntary, placed in chart  3- Case discussed with Dr. Astudillo, in person  Will follow

## 2020-09-01 NOTE — PROGRESS NOTE ADULT - PROBLEM SELECTOR PROBLEM 2
Generalized abdominal pain

## 2020-09-01 NOTE — PROGRESS NOTE BEHAVIORAL HEALTH - PRIMARY DX
MDD (major depressive disorder), recurrent episode
Depression
MDD (major depressive disorder), recurrent episode

## 2020-09-01 NOTE — PROGRESS NOTE ADULT - SUBJECTIVE AND OBJECTIVE BOX
Patient is a 34y old  Male who presents with a chief complaint of Alcohol withdrawal (31 Aug 2020 10:20)      SUBJECTIVE / OVERNIGHT EVENTS:    MEDICATIONS  (STANDING):  enoxaparin Injectable 40 milliGRAM(s) SubCutaneous daily  folic acid 1 milliGRAM(s) Oral daily  gabapentin 300 milliGRAM(s) Oral three times a day  melatonin 3 milliGRAM(s) Oral at bedtime  multivitamin 1 Tablet(s) Oral daily  QUEtiapine 100 milliGRAM(s) Oral at bedtime  sertraline 100 milliGRAM(s) Oral daily    MEDICATIONS  (PRN):  acetaminophen   Tablet .. 650 milliGRAM(s) Oral every 6 hours PRN Temp greater or equal to 38C (100.4F), Mild Pain (1 - 3), Moderate Pain (4 - 6)  cyclobenzaprine 5 milliGRAM(s) Oral three times a day PRN Muscle Spasm  hydrOXYzine hydrochloride 50 milliGRAM(s) Oral every 6 hours PRN Anxiety  ondansetron Injectable 4 milliGRAM(s) IV Push every 4 hours PRN Nausea and/or Vomiting      Vital Signs Last 24 Hrs  T(C): 36.4 (01 Sep 2020 05:19), Max: 36.5 (31 Aug 2020 19:12)  T(F): 97.6 (01 Sep 2020 05:19), Max: 97.7 (31 Aug 2020 19:12)  HR: 67 (01 Sep 2020 05:19) (63 - 67)  BP: 104/64 (01 Sep 2020 05:19) (104/64 - 114/71)  BP(mean): --  RR: 18 (01 Sep 2020 05:19) (18 - 18)  SpO2: 96% (01 Sep 2020 05:19) (96% - 99%)  CAPILLARY BLOOD GLUCOSE        I&O's Summary      PHYSICAL EXAM:  GENERAL: NAD, well-developed  HEAD:  Atraumatic, Normocephalic  EYES: EOMI, PERRLA, conjunctiva and sclera clear  NECK: Supple, No JVD  CHEST/LUNG: Clear to auscultation bilaterally; No wheeze  HEART: Regular rate and rhythm; No murmurs, rubs, or gallops  ABDOMEN: Soft, Nontender, Nondistended; Bowel sounds present  EXTREMITIES:  2+ Peripheral Pulses, No clubbing, cyanosis, or edema  PSYCH: AAOx3  NEUROLOGY: non-focal  SKIN: No rashes or lesions    LABS:                        16.4   8.49  )-----------( 163      ( 31 Aug 2020 05:16 )             49.5     08-31    137  |  96<L>  |  16  ----------------------------<  57<L>  3.9   |  25  |  0.72    Ca    9.4      31 Aug 2020 05:16  Phos  2.9     08-31  Mg     1.9     08-31                RADIOLOGY & ADDITIONAL TESTS:    Imaging Personally Reviewed:    Consultant(s) Notes Reviewed:      Care Discussed with Consultants/Other Providers: Patient is a 34y old  Male who presents with a chief complaint of Alcohol withdrawal (31 Aug 2020 10:20)      SUBJECTIVE / OVERNIGHT EVENTS: patient seen and examined by bedside along with Psych, pt more alert and awake today, agreeable to discharge to  [facility] , still feeling suicidal, no acute distress noted        MEDICATIONS  (STANDING):  enoxaparin Injectable 40 milliGRAM(s) SubCutaneous daily  folic acid 1 milliGRAM(s) Oral daily  gabapentin 300 milliGRAM(s) Oral three times a day  melatonin 3 milliGRAM(s) Oral at bedtime  multivitamin 1 Tablet(s) Oral daily  QUEtiapine 100 milliGRAM(s) Oral at bedtime  sertraline 100 milliGRAM(s) Oral daily    MEDICATIONS  (PRN):  acetaminophen   Tablet .. 650 milliGRAM(s) Oral every 6 hours PRN Temp greater or equal to 38C (100.4F), Mild Pain (1 - 3), Moderate Pain (4 - 6)  cyclobenzaprine 5 milliGRAM(s) Oral three times a day PRN Muscle Spasm  hydrOXYzine hydrochloride 50 milliGRAM(s) Oral every 6 hours PRN Anxiety  ondansetron Injectable 4 milliGRAM(s) IV Push every 4 hours PRN Nausea and/or Vomiting      Vital Signs Last 24 Hrs  T(C): 36.4 (01 Sep 2020 05:19), Max: 36.5 (31 Aug 2020 19:12)  T(F): 97.6 (01 Sep 2020 05:19), Max: 97.7 (31 Aug 2020 19:12)  HR: 67 (01 Sep 2020 05:19) (63 - 67)  BP: 104/64 (01 Sep 2020 05:19) (104/64 - 114/71)  BP(mean): --  RR: 18 (01 Sep 2020 05:19) (18 - 18)  SpO2: 96% (01 Sep 2020 05:19) (96% - 99%)      PHYSICAL EXAM  GENERAL: NAD, well-developed  CHEST/LUNG: Clear to auscultation bilaterally; No wheeze  HEART: Regular rate and rhythm; No murmurs, rubs, or gallops  ABDOMEN: Soft, Nontender, Nondistended; Bowel sounds present  EXTREMITIES:  2+ Peripheral Pulses, No clubbing, cyanosis, or edema  PSYCH:  sleeping, calm           LABS:                        16.4   8.49  )-----------( 163      ( 31 Aug 2020 05:16 )             49.5     08-31    137  |  96<L>  |  16  ----------------------------<  57<L>  3.9   |  25  |  0.72    Ca    9.4      31 Aug 2020 05:16  Phos  2.9     08-31  Mg     1.9     08-31                RADIOLOGY & ADDITIONAL TESTS:    Imaging Personally Reviewed:    Consultant(s) Notes Reviewed:      Care Discussed with Consultants/Other Providers: psychiatry

## 2020-09-01 NOTE — PROGRESS NOTE ADULT - PROBLEM SELECTOR PROBLEM 4
Multiple substance abuse

## 2020-09-01 NOTE — PROGRESS NOTE BEHAVIORAL HEALTH - NSBHCHARTREVIEWLAB_PSY_A_CORE FT
16.4   8.49  )-----------( 163      ( 31 Aug 2020 05:16 )             49.5     08-31    137  |  96<L>  |  16  ----------------------------<  57<L>  3.9   |  25  |  0.72    Ca    9.4      31 Aug 2020 05:16  Phos  2.9     08-31  Mg     1.9     08-31
16.2   6.17  )-----------( 136      ( 28 Aug 2020 07:00 )             48.2
CBC Full  -  ( 28 Aug 2020 07:00 )  WBC Count : 6.17 K/uL  RBC Count : 4.92 M/uL  Hemoglobin : 16.2 g/dL  Hematocrit : 48.2 %  Platelet Count - Automated : 136 K/uL  Mean Cell Volume : 98.0 fL  Mean Cell Hemoglobin : 32.9 pg  Mean Cell Hemoglobin Concentration : 33.6 %  Auto Neutrophil # : x  Auto Lymphocyte # : x  Auto Monocyte # : x  Auto Eosinophil # : x  Auto Basophil # : x  Auto Neutrophil % : x  Auto Lymphocyte % : x  Auto Monocyte % : x  Auto Eosinophil % : x  Auto Basophil % : x
16.4   8.49  )-----------( 163      ( 31 Aug 2020 05:16 )             49.5     08-31    137  |  96<L>  |  16  ----------------------------<  57<L>  3.9   |  25  |  0.72    Ca    9.4      31 Aug 2020 05:16  Phos  2.9     08-31  Mg     1.9     08-31

## 2020-09-01 NOTE — PROGRESS NOTE BEHAVIORAL HEALTH - NSBHCONSULTFOLLOWDETAILS_PSY_A_CORE FT
Needs admission to [facility], meets 2PC criterion given high risk but patient aware of risk and willing to sign in voluntarily, 9.13 in chart  Cannot leave AMA

## 2020-09-01 NOTE — PROGRESS NOTE BEHAVIORAL HEALTH - ORIENTATION OTHER
AAOX3
said year was 2022 or 2023, didn't know which hospital he was in, or which county
said year was 2022 or 2023, didn't know which hospital he was in, or which county

## 2020-09-01 NOTE — PROGRESS NOTE ADULT - ATTENDING COMMENTS
Patient hemodynamically stable for discharge to [facility]   Time spent in discharge process is 40 min
Patient hemodynamically stable for discharge to [facility]   Time spent in discharge process is 40 min
DC planning to [facility] after weekend likely

## 2020-09-01 NOTE — DIETITIAN INITIAL EVALUATION ADULT. - ADD RECOMMEND
1. Encourage PO intake and honor food preferences as able. 2. Continue multivitamin and folic acid daily.

## 2020-09-01 NOTE — PROGRESS NOTE ADULT - PROBLEM SELECTOR PLAN 2
vague pain, but unremarkable abd exam and labs unremarkable. Suspect gastritis from alcohol use  -Zofran PRN for nausea and/or vomiting  - famotidine 20mg BID PRN
vague pain, but unremarkable abd exam and labs unremarkable. Suspect gastritis from alcohol use  -Zofran PRN for nausea and/or vomiting  - famotidine 20mg IV x1 dose --> if help will start famotidine 20mg BID PRN
vague pain, but unremarkable abd exam and labs unremarkable. Suspect gastritis from alcohol use  -Zofran PRN for nausea and/or vomiting  - famotidine 20mg BID PRN

## 2020-09-01 NOTE — PROGRESS NOTE ADULT - REASON FOR ADMISSION
Alcohol withdrawal
Alcohol withdrawal and suicidal ideation
Alcohol withdrawal

## 2020-09-01 NOTE — PROGRESS NOTE BEHAVIORAL HEALTH - NSBHFUPINTERVALHXFT_PSY_A_CORE
Patient reports continued low mood, still endorses suicidal thoughts, amenable to going to [facility] on voluntary basis says "I really need some help", states he did nto sleep much during hospitalization however when seen is sleeping frequently, amenable to increase in seroquel.

## 2020-09-01 NOTE — PROGRESS NOTE ADULT - PROBLEM SELECTOR PLAN 3
Pt with suicidal ideation but stated it is likely to be exacerbated outside of the hospital. No other hallucinations. Evaluated by psych. Pt agreeable to voluntary inpatient psych admission.  - c/w gabapentin, seroquel, hydroxyzine, sertraline  - patient pending [facility] admission  -  Psych f/u noted --> continue constant observation Pt with suicidal ideation but stated it is likely to be exacerbated outside of the hospital. No other hallucinations. Evaluated by psych. Pt agreeable to voluntary inpatient psych admission.  - c/w gabapentin, seroquel, hydroxyzine, sertraline  - patient pending [facility] admission  -  Psych f/u noted --> continue constant observation while in hospital

## 2020-09-01 NOTE — PROGRESS NOTE BEHAVIORAL HEALTH - NSBHATTESTSEENBY_PSY_A_CORE
attending Psychiatrist without NP/Trainee
Attending Psychiatrist supervising NP/Trainee, meeting pt...
attending Psychiatrist without NP/Trainee

## 2020-09-01 NOTE — PROGRESS NOTE BEHAVIORAL HEALTH - NSBHCONSULTSUBSTANCEALCOHOL_PSY_A_CORE FT
symptom triggered CIWA with Ativan

## 2020-09-01 NOTE — DIETITIAN INITIAL EVALUATION ADULT. - OTHER INFO
35 y/o M with PMH cocaine and alcohol abuse admitted for SI and alcohol intoxication. Pt with pending inpatient psych admission. Pt reports good PO intake and appetite in the hospital (improved since start of admission). Denies any GI issues (nausea/vomiting/diarrhea/constipation.) Denies any chewing or swallowing difficulties at this time. NKFA.     PTA pt with poor PO intake stating he rather drink. For past 3 months, he has been consuming mostly alcohol and skipping meals.  pounds and admit wt 160.7 pounds (20 pound wt loss in ~3 months).

## 2020-09-01 NOTE — DIETITIAN INITIAL EVALUATION ADULT. - PERTINENT MEDS FT
MEDICATIONS  (STANDING):  enoxaparin Injectable 40 milliGRAM(s) SubCutaneous daily  folic acid 1 milliGRAM(s) Oral daily  gabapentin 300 milliGRAM(s) Oral three times a day  melatonin 3 milliGRAM(s) Oral at bedtime  multivitamin 1 Tablet(s) Oral daily  QUEtiapine 100 milliGRAM(s) Oral at bedtime  sertraline 100 milliGRAM(s) Oral daily

## 2020-09-01 NOTE — DISCHARGE NOTE NURSING/CASE MANAGEMENT/SOCIAL WORK - PATIENT PORTAL LINK FT
You can access the FollowMyHealth Patient Portal offered by [facility] by registering at the following website: [email or web address]. By joining [employer]’s FollowMyHealth portal, you will also be able to view your health information using other applications (apps) compatible with our system.

## 2020-09-01 NOTE — PROGRESS NOTE BEHAVIORAL HEALTH - NSBHCHARTREVIEWINVESTIGATE_PSY_A_CORE FT
< from: 12 Lead ECG (07.15.20 @ 12:00) >    QTC Calculation(Bezet) 452 ms    < end of copied text >
< from: 12 Lead ECG (07.15.20 @ 12:00) >      QTC Calculation(Bezet) 452 ms    < end of copied text >

## 2020-09-01 NOTE — PROGRESS NOTE BEHAVIORAL HEALTH - NSBHCONSULTMEDS_PSY_A_CORE FT
seroquel 100 mg QHS  sertraline 100 mg QD (please give at 9AM as pt. prefers to take this in the morning)  gabapentin 300 mg TID  hold meds if severe sedation    Thiamine 500mg IV tid for 2-3 days.    Monitor closely for alcohol withdrawal sign and symptoms, consider restart Librium taper if patient started to show sxs of withdrawal, scoring high on CIWA or requiring multiple prns.

## 2020-09-01 NOTE — PROGRESS NOTE BEHAVIORAL HEALTH - RISK ASSESSMENT
Patient is at intermediate acute risk of suicide or self harm. Risk factors include not engaged in treatment, depressed mood, mood disorder, substance use, recent suicidal behavior and attempts, endorses current suicidal ideation, intent, and plan (if not in hospital), poor impulse control, not adherent with medications. Patient denies suicidal intent or plan while in the hospital. Patient will likely require admission to inpatient psychiatric hospital.

## 2020-09-02 PROCEDURE — 99223 1ST HOSP IP/OBS HIGH 75: CPT

## 2020-09-02 PROCEDURE — 99231 SBSQ HOSP IP/OBS SF/LOW 25: CPT

## 2020-09-02 RX ADMIN — GABAPENTIN 300 MILLIGRAM(S): 400 CAPSULE ORAL at 13:56

## 2020-09-02 RX ADMIN — GABAPENTIN 300 MILLIGRAM(S): 400 CAPSULE ORAL at 20:36

## 2020-09-02 RX ADMIN — GABAPENTIN 300 MILLIGRAM(S): 400 CAPSULE ORAL at 09:32

## 2020-09-02 RX ADMIN — Medication 1 TABLET(S): at 09:32

## 2020-09-02 RX ADMIN — SERTRALINE 100 MILLIGRAM(S): 25 TABLET, FILM COATED ORAL at 09:32

## 2020-09-02 RX ADMIN — Medication 50 MILLIGRAM(S): at 15:23

## 2020-09-02 RX ADMIN — Medication 3 MILLIGRAM(S): at 20:36

## 2020-09-02 RX ADMIN — QUETIAPINE FUMARATE 100 MILLIGRAM(S): 200 TABLET, FILM COATED ORAL at 20:36

## 2020-09-02 RX ADMIN — Medication 1 MILLIGRAM(S): at 09:32

## 2020-09-02 NOTE — CONSULT NOTE ADULT - ASSESSMENT
A 35 yo man with hx of polysubstance abuse disorder, including cocaine use and alcohol abuse who had presented with suicidal ideation and alcohol intoxication,- admitted to [facility] for psych evaluation and  observation, management for alcohol withdrawal and suicidal ideation- patient was agreeable to voluntary psychiatric admission therefore, transferred to [facility] on 9/1.     Etoh Withdrawal:   - Now off symptom triggered CIWA.   - Currently, no signs/ symptoms of Etoh withdrawal.   - Counseling on Etoh cessation.   - Continue MVI, Thiamine, Folic A, Gabapentin.     Suicidal Ideation  - Sertraline,   - Management per Primary/ Behavioral Health team. A 33 yo man with hx of polysubstance abuse disorder, including cocaine use and alcohol abuse who had presented with suicidal ideation and alcohol intoxication,- admitted to [facility] for psych evaluation and  observation, management for alcohol withdrawal and suicidal ideation- patient was agreeable to voluntary psychiatric admission therefore, transferred to [facility] on 9/1.     Etoh Withdrawal:   - Now off symptom triggered CIWA.   - Currently, no signs/ symptoms of Etoh withdrawal.   - Counseling on Etoh cessation.   - Continue MVI, Thiamine, Folic A, Gabapentin.     Suicidal Ideation  - Sertraline,   - Management per Primary/ Behavioral Health team.     No history of prior CVA, CAD, DM, Hypertension.

## 2020-09-02 NOTE — CONSULT NOTE ADULT - SUBJECTIVE AND OBJECTIVE BOX
HPI: A 35 yo man with hx of polysubstance abuse disorder, including cocaine use and alcohol abuse presenting with suicidal ideation and alcohol intoxication, recently admitted to [facility] for psych evaluation and  observation, management for alcohol withdrawal and suicidal ideation. Received symptom triggered Ativan PRN, remains on MVI, Thiamine, FA. Also had complaints of abdominal pain- attributed to alcohol related gastritis started on Famotidine. Patient was evaluated by  team, patient was agreeable to voluntary psychiatric admission therefore transferred to [facility] on .     Patient seen and evaluated at bedside.     PAST MEDICAL & SURGICAL HISTORY:  Alcohol abuse  No significant past surgical history    Review of Systems:   CONSTITUTIONAL: No fever, weight loss, or fatigue  EYES: No eye pain, visual disturbances, or discharge  ENMT:  No difficulty hearing, tinnitus, vertigo; No sinus or throat pain  NECK: No pain or stiffness  RESPIRATORY: No cough, wheezing, chills or hemoptysis; No shortness of breath  CARDIOVASCULAR: No chest pain, palpitations, dizziness, or leg swelling  GASTROINTESTINAL: No abdominal or epigastric pain. No nausea, vomiting, or hematemesis; No diarrhea or constipation. No melena or hematochezia.  GENITOURINARY: No dysuria, frequency, hematuria, or incontinence  NEUROLOGICAL: No headaches, memory loss, loss of strength, numbness, or tremors  SKIN: No itching, burning, rashes, or lesions   LYMPH NODES: No enlarged glands  ENDOCRINE: No heat or cold intolerance; No hair loss  MUSCULOSKELETAL: No joint pain or swelling; No muscle, back, or extremity pain  HEME/LYMPH: No easy bruising, or bleeding gums  ALLERY AND IMMUNOLOGIC: No hives or eczema    Allergies    No Known Allergies    Intolerances    Social History:     FAMILY HISTORY:  No pertinent family history in first degree relatives    MEDICATIONS  (STANDING):  folic acid 1 milliGRAM(s) Oral daily  gabapentin 300 milliGRAM(s) Oral three times a day  melatonin. 3 milliGRAM(s) Oral at bedtime  multivitamin 1 Tablet(s) Oral daily  QUEtiapine 100 milliGRAM(s) Oral at bedtime  sertraline 100 milliGRAM(s) Oral daily    MEDICATIONS  (PRN):  acetaminophen   Tablet .. 650 milliGRAM(s) Oral every 6 hours PRN Mild Pain (1 - 3), Moderate Pain (4 - 6), Severe Pain (7 - 10)  diphenhydrAMINE   Injectable 50 milliGRAM(s) IntraMuscular once PRN agitation  haloperidol    Injectable 5 milliGRAM(s) IntraMuscular once PRN agitation  hydrOXYzine hydrochloride 50 milliGRAM(s) Oral every 6 hours PRN anxiety    Vital Signs Last 24 Hrs  T(C): 36.6 (01 Sep 2020 19:21), Max: 36.6 (01 Sep 2020 19:21)  T(F): 97.9 (01 Sep 2020 19:21), Max: 97.9 (01 Sep 2020 19:21)  HR: 76 (01 Sep 2020 13:02) (76 - 76)  BP: 113/57 (01 Sep 2020 13:02) (113/57 - 113/57)  BP(mean): --  RR: 18 (01 Sep 2020 13:02) (18 - 18)  SpO2: 97% (01 Sep 2020 13:02) (97% - 97%)  CAPILLARY BLOOD GLUCOSE    PHYSICAL EXAM:  GENERAL: NAD, well-developed  HEAD:  Atraumatic, Normocephalic  EYES: EOMI, conjunctiva and sclera clear  NECK: Supple, No JVD  CHEST/LUNG: Clear to auscultation bilaterally; No wheeze  HEART: Regular rate and rhythm; No murmurs, rubs, or gallops  ABDOMEN: Soft, Nontender, Nondistended; Bowel sounds present  EXTREMITIES:  2+ Peripheral Pulses, No clubbing, cyanosis, or edema  NEUROLOGY: non-focal  SKIN: No rashes or lesions    LABS: none new to review.     Urinalysis Basic - ( 01 Sep 2020 18:46 )    Color: YELLOW / Appearance: CLEAR / S.024 / pH: 6.5  Gluc: NEGATIVE / Ketone: NEGATIVE  / Bili: NEGATIVE / Urobili: TRACE   Blood: NEGATIVE / Protein: NEGATIVE / Nitrite: NEGATIVE   Leuk Esterase: NEGATIVE / RBC: x / WBC x   Sq Epi: x / Non Sq Epi: x / Bacteria: x    EKG(personally reviewed):    RADIOLOGY & ADDITIONAL TESTS:    Imaging Personally Reviewed:    Consultant(s) Notes Reviewed:      Care Discussed with Consultants/Other Providers: HPI: A 35 yo man with hx of polysubstance abuse disorder, including cocaine use and alcohol abuse presenting with suicidal ideation and alcohol intoxication, recently admitted to [facility] for psych evaluation and  observation, management for alcohol withdrawal and suicidal ideation. Received symptom triggered Ativan PRN, remains on MVI, Thiamine, FA. Also had complaints of abdominal pain- attributed to alcohol related gastritis started on Famotidine. Patient was evaluated by  team, patient was agreeable to voluntary psychiatric admission therefore transferred to [facility] on .     Patient seen and evaluated at bedside. Sleeping, arousable, responsive to questioning- oriented x3. Reports feeling well, states he is sleep and would like to go back to sleep. Denies any current symptoms of SOB, chest pain, palpitations, headaches, nausea, vomiting diarrhea, urinary complaints, difficulty ambulating. No new events reported since admission.     PAST MEDICAL & SURGICAL HISTORY:  Alcohol abuse  No significant past surgical history    Review of Systems: limited as patient requested to go back to sleep as he was really tired. ROS as above.     Allergies    No Known Allergies    Intolerances    Social History:     FAMILY HISTORY:  No pertinent family history in first degree relatives    MEDICATIONS  (STANDING):  folic acid 1 milliGRAM(s) Oral daily  gabapentin 300 milliGRAM(s) Oral three times a day  melatonin. 3 milliGRAM(s) Oral at bedtime  multivitamin 1 Tablet(s) Oral daily  QUEtiapine 100 milliGRAM(s) Oral at bedtime  sertraline 100 milliGRAM(s) Oral daily    MEDICATIONS  (PRN):  acetaminophen   Tablet .. 650 milliGRAM(s) Oral every 6 hours PRN Mild Pain (1 - 3), Moderate Pain (4 - 6), Severe Pain (7 - 10)  diphenhydrAMINE   Injectable 50 milliGRAM(s) IntraMuscular once PRN agitation  haloperidol    Injectable 5 milliGRAM(s) IntraMuscular once PRN agitation  hydrOXYzine hydrochloride 50 milliGRAM(s) Oral every 6 hours PRN anxiety    Vital Signs Last 24 Hrs  T(C): 36.6 (01 Sep 2020 19:21), Max: 36.6 (01 Sep 2020 19:21)  T(F): 97.9 (01 Sep 2020 19:21), Max: 97.9 (01 Sep 2020 19:21)  HR: 76 (01 Sep 2020 13:02) (76 - 76)  BP: 113/57 (01 Sep 2020 13:02) (113/57 - 113/57)  BP(mean): --  RR: 18 (01 Sep 2020 13:02) (18 - 18)  SpO2: 97% (01 Sep 2020 13:02) (97% - 97%)  CAPILLARY BLOOD GLUCOSE    PHYSICAL EXAM:  GENERAL: NAD, well-developed  HEAD:  Atraumatic, Normocephalic  EYES: EOMI, conjunctiva and sclera clear  NECK: Supple, No JVD  CHEST/LUNG: Clear to auscultation bilaterally; No wheeze  HEART: Regular rate and rhythm; No murmurs, rubs, or gallops  ABDOMEN: Soft, Nontender, Nondistended; Bowel sounds present  EXTREMITIES:  2+ Peripheral Pulses, No clubbing, cyanosis, or edema  NEUROLOGY: non-focal  SKIN: No rashes or lesions    LABS: none new to review.     Urinalysis Basic - ( 01 Sep 2020 18:46 )    Color: YELLOW / Appearance: CLEAR / S.024 / pH: 6.5  Gluc: NEGATIVE / Ketone: NEGATIVE  / Bili: NEGATIVE / Urobili: TRACE   Blood: NEGATIVE / Protein: NEGATIVE / Nitrite: NEGATIVE   Leuk Esterase: NEGATIVE / RBC: x / WBC x   Sq Epi: x / Non Sq Epi: x / Bacteria: x    EKG(personally reviewed):    RADIOLOGY & ADDITIONAL TESTS:    Imaging Personally Reviewed:    Consultant(s) Notes Reviewed:      Care Discussed with Consultants/Other Providers:

## 2020-09-02 NOTE — CONSULT NOTE ADULT - CONSULT REASON
[facility] Medicine transfer to [facility] - Medicine Consultation on admission to [facility].

## 2020-09-03 PROCEDURE — 99231 SBSQ HOSP IP/OBS SF/LOW 25: CPT

## 2020-09-03 RX ORDER — QUETIAPINE FUMARATE 200 MG/1
200 TABLET, FILM COATED ORAL AT BEDTIME
Refills: 0 | Status: DISCONTINUED | OUTPATIENT
Start: 2020-09-03 | End: 2020-09-04

## 2020-09-03 RX ORDER — LANOLIN ALCOHOL/MO/W.PET/CERES
1 CREAM (GRAM) TOPICAL
Qty: 14 | Refills: 0
Start: 2020-09-03 | End: 2020-09-16

## 2020-09-03 RX ORDER — GABAPENTIN 400 MG/1
1 CAPSULE ORAL
Qty: 42 | Refills: 0
Start: 2020-09-03 | End: 2020-09-16

## 2020-09-03 RX ORDER — CYCLOBENZAPRINE HYDROCHLORIDE 10 MG/1
1 TABLET, FILM COATED ORAL
Qty: 42 | Refills: 0
Start: 2020-09-03 | End: 2020-09-16

## 2020-09-03 RX ORDER — SERTRALINE 25 MG/1
1 TABLET, FILM COATED ORAL
Qty: 14 | Refills: 0
Start: 2020-09-03 | End: 2020-09-16

## 2020-09-03 RX ORDER — NALTREXONE HYDROCHLORIDE 50 MG/1
1 TABLET, FILM COATED ORAL
Qty: 14 | Refills: 0
Start: 2020-09-03 | End: 2020-09-16

## 2020-09-03 RX ORDER — HYDROXYZINE HCL 10 MG
1 TABLET ORAL
Qty: 56 | Refills: 0
Start: 2020-09-03 | End: 2020-09-16

## 2020-09-03 RX ORDER — QUETIAPINE FUMARATE 200 MG/1
2 TABLET, FILM COATED ORAL
Qty: 28 | Refills: 0
Start: 2020-09-03 | End: 2020-09-16

## 2020-09-03 RX ADMIN — GABAPENTIN 300 MILLIGRAM(S): 400 CAPSULE ORAL at 08:09

## 2020-09-03 RX ADMIN — Medication 650 MILLIGRAM(S): at 17:42

## 2020-09-03 RX ADMIN — Medication 50 MILLIGRAM(S): at 14:30

## 2020-09-03 RX ADMIN — Medication 1 MILLIGRAM(S): at 08:09

## 2020-09-03 RX ADMIN — Medication 50 MILLIGRAM(S): at 20:30

## 2020-09-03 RX ADMIN — Medication 3 MILLIGRAM(S): at 20:18

## 2020-09-03 RX ADMIN — Medication 1 TABLET(S): at 08:09

## 2020-09-03 RX ADMIN — Medication 650 MILLIGRAM(S): at 18:15

## 2020-09-03 RX ADMIN — QUETIAPINE FUMARATE 200 MILLIGRAM(S): 200 TABLET, FILM COATED ORAL at 20:18

## 2020-09-03 RX ADMIN — GABAPENTIN 300 MILLIGRAM(S): 400 CAPSULE ORAL at 20:18

## 2020-09-03 RX ADMIN — SERTRALINE 100 MILLIGRAM(S): 25 TABLET, FILM COATED ORAL at 08:09

## 2020-09-03 RX ADMIN — Medication 50 MILLIGRAM(S): at 08:09

## 2020-09-03 RX ADMIN — GABAPENTIN 300 MILLIGRAM(S): 400 CAPSULE ORAL at 13:01

## 2020-09-03 RX ADMIN — Medication 50 MILLIGRAM(S): at 01:00

## 2020-09-04 VITALS — HEART RATE: 98 BPM | DIASTOLIC BLOOD PRESSURE: 74 MMHG | SYSTOLIC BLOOD PRESSURE: 116 MMHG | TEMPERATURE: 98 F

## 2020-09-04 PROCEDURE — 99238 HOSP IP/OBS DSCHRG MGMT 30/<: CPT

## 2020-09-04 RX ADMIN — Medication 1 TABLET(S): at 08:43

## 2020-09-04 RX ADMIN — SERTRALINE 100 MILLIGRAM(S): 25 TABLET, FILM COATED ORAL at 08:43

## 2020-09-04 RX ADMIN — Medication 1 MILLIGRAM(S): at 08:43

## 2020-09-04 RX ADMIN — GABAPENTIN 300 MILLIGRAM(S): 400 CAPSULE ORAL at 08:43

## 2020-09-04 RX ADMIN — Medication 50 MILLIGRAM(S): at 09:46

## 2020-09-11 ENCOUNTER — INPATIENT (INPATIENT)
Facility: HOSPITAL | Age: 35
LOS: 4 days | Discharge: HOME | End: 2020-09-16
Attending: HOSPITALIST | Admitting: HOSPITALIST
Payer: MEDICAID

## 2020-09-11 VITALS
HEART RATE: 91 BPM | OXYGEN SATURATION: 100 % | RESPIRATION RATE: 16 BRPM | HEIGHT: 68 IN | TEMPERATURE: 98 F | SYSTOLIC BLOOD PRESSURE: 119 MMHG | DIASTOLIC BLOOD PRESSURE: 95 MMHG

## 2020-09-11 DIAGNOSIS — F10.239 ALCOHOL DEPENDENCE WITH WITHDRAWAL, UNSPECIFIED: ICD-10-CM

## 2020-09-11 LAB
ALBUMIN SERPL ELPH-MCNC: 4.6 G/DL — SIGNIFICANT CHANGE UP (ref 3.3–5)
ALP SERPL-CCNC: 83 U/L — SIGNIFICANT CHANGE UP (ref 40–120)
ALT FLD-CCNC: 47 U/L — HIGH (ref 4–41)
ANION GAP SERPL CALC-SCNC: 14 MMO/L — SIGNIFICANT CHANGE UP (ref 7–14)
APAP SERPL-MCNC: < 15 UG/ML — LOW (ref 15–25)
AST SERPL-CCNC: 59 U/L — HIGH (ref 4–40)
BASOPHILS # BLD AUTO: 0.1 K/UL — SIGNIFICANT CHANGE UP (ref 0–0.2)
BASOPHILS NFR BLD AUTO: 0.9 % — SIGNIFICANT CHANGE UP (ref 0–2)
BILIRUB SERPL-MCNC: 0.4 MG/DL — SIGNIFICANT CHANGE UP (ref 0.2–1.2)
BUN SERPL-MCNC: 13 MG/DL — SIGNIFICANT CHANGE UP (ref 7–23)
CALCIUM SERPL-MCNC: 8.9 MG/DL — SIGNIFICANT CHANGE UP (ref 8.4–10.5)
CHLORIDE SERPL-SCNC: 101 MMOL/L — SIGNIFICANT CHANGE UP (ref 98–107)
CO2 SERPL-SCNC: 26 MMOL/L — SIGNIFICANT CHANGE UP (ref 22–31)
CREAT SERPL-MCNC: 0.65 MG/DL — SIGNIFICANT CHANGE UP (ref 0.5–1.3)
EOSINOPHIL # BLD AUTO: 0.22 K/UL — SIGNIFICANT CHANGE UP (ref 0–0.5)
EOSINOPHIL NFR BLD AUTO: 2 % — SIGNIFICANT CHANGE UP (ref 0–6)
ETHANOL BLD-MCNC: 73 MG/DL — HIGH
GLUCOSE SERPL-MCNC: 82 MG/DL — SIGNIFICANT CHANGE UP (ref 70–99)
HCT VFR BLD CALC: 41.6 % — SIGNIFICANT CHANGE UP (ref 39–50)
HGB BLD-MCNC: 14.1 G/DL — SIGNIFICANT CHANGE UP (ref 13–17)
IMM GRANULOCYTES NFR BLD AUTO: 0.4 % — SIGNIFICANT CHANGE UP (ref 0–1.5)
LYMPHOCYTES # BLD AUTO: 18.1 % — SIGNIFICANT CHANGE UP (ref 13–44)
LYMPHOCYTES # BLD AUTO: 2.03 K/UL — SIGNIFICANT CHANGE UP (ref 1–3.3)
MAGNESIUM SERPL-MCNC: 1.8 MG/DL — SIGNIFICANT CHANGE UP (ref 1.6–2.6)
MCHC RBC-ENTMCNC: 33.8 PG — SIGNIFICANT CHANGE UP (ref 27–34)
MCHC RBC-ENTMCNC: 33.9 % — SIGNIFICANT CHANGE UP (ref 32–36)
MCV RBC AUTO: 99.8 FL — SIGNIFICANT CHANGE UP (ref 80–100)
MONOCYTES # BLD AUTO: 0.69 K/UL — SIGNIFICANT CHANGE UP (ref 0–0.9)
MONOCYTES NFR BLD AUTO: 6.1 % — SIGNIFICANT CHANGE UP (ref 2–14)
NEUTROPHILS # BLD AUTO: 8.13 K/UL — HIGH (ref 1.8–7.4)
NEUTROPHILS NFR BLD AUTO: 72.5 % — SIGNIFICANT CHANGE UP (ref 43–77)
NRBC # FLD: 0 K/UL — SIGNIFICANT CHANGE UP (ref 0–0)
PHOSPHATE SERPL-MCNC: 2.5 MG/DL — SIGNIFICANT CHANGE UP (ref 2.5–4.5)
PLATELET # BLD AUTO: 257 K/UL — SIGNIFICANT CHANGE UP (ref 150–400)
PMV BLD: 8.2 FL — SIGNIFICANT CHANGE UP (ref 7–13)
POTASSIUM SERPL-MCNC: 3.9 MMOL/L — SIGNIFICANT CHANGE UP (ref 3.5–5.3)
POTASSIUM SERPL-SCNC: 3.9 MMOL/L — SIGNIFICANT CHANGE UP (ref 3.5–5.3)
PROT SERPL-MCNC: 6.9 G/DL — SIGNIFICANT CHANGE UP (ref 6–8.3)
RBC # BLD: 4.17 M/UL — LOW (ref 4.2–5.8)
RBC # FLD: 13.9 % — SIGNIFICANT CHANGE UP (ref 10.3–14.5)
SALICYLATES SERPL-MCNC: < 5 MG/DL — LOW (ref 15–30)
SARS-COV-2 RNA SPEC QL NAA+PROBE: SIGNIFICANT CHANGE UP
SODIUM SERPL-SCNC: 141 MMOL/L — SIGNIFICANT CHANGE UP (ref 135–145)
TSH SERPL-MCNC: 1.34 UIU/ML — SIGNIFICANT CHANGE UP (ref 0.27–4.2)
WBC # BLD: 11.22 K/UL — HIGH (ref 3.8–10.5)
WBC # FLD AUTO: 11.22 K/UL — HIGH (ref 3.8–10.5)

## 2020-09-11 PROCEDURE — 99223 1ST HOSP IP/OBS HIGH 75: CPT

## 2020-09-11 PROCEDURE — 99285 EMERGENCY DEPT VISIT HI MDM: CPT

## 2020-09-11 PROCEDURE — 99223 1ST HOSP IP/OBS HIGH 75: CPT | Mod: GC

## 2020-09-11 RX ORDER — SODIUM CHLORIDE 9 MG/ML
1000 INJECTION, SOLUTION INTRAVENOUS
Refills: 0 | Status: DISCONTINUED | OUTPATIENT
Start: 2020-09-11 | End: 2020-09-11

## 2020-09-11 RX ORDER — MORPHINE SULFATE 50 MG/1
2 CAPSULE, EXTENDED RELEASE ORAL ONCE
Refills: 0 | Status: DISCONTINUED | OUTPATIENT
Start: 2020-09-11 | End: 2020-09-11

## 2020-09-11 RX ORDER — FOLIC ACID 0.8 MG
1 TABLET ORAL DAILY
Refills: 0 | Status: DISCONTINUED | OUTPATIENT
Start: 2020-09-12 | End: 2020-09-16

## 2020-09-11 RX ORDER — ACETAMINOPHEN 500 MG
975 TABLET ORAL ONCE
Refills: 0 | Status: COMPLETED | OUTPATIENT
Start: 2020-09-11 | End: 2020-09-11

## 2020-09-11 RX ORDER — SODIUM CHLORIDE 9 MG/ML
1000 INJECTION INTRAMUSCULAR; INTRAVENOUS; SUBCUTANEOUS ONCE
Refills: 0 | Status: COMPLETED | OUTPATIENT
Start: 2020-09-11 | End: 2020-09-11

## 2020-09-11 RX ORDER — SERTRALINE 25 MG/1
100 TABLET, FILM COATED ORAL DAILY
Refills: 0 | Status: DISCONTINUED | OUTPATIENT
Start: 2020-09-11 | End: 2020-09-16

## 2020-09-11 RX ORDER — SODIUM CHLORIDE 9 MG/ML
1000 INJECTION, SOLUTION INTRAVENOUS
Refills: 0 | Status: DISCONTINUED | OUTPATIENT
Start: 2020-09-11 | End: 2020-09-16

## 2020-09-11 RX ORDER — THIAMINE MONONITRATE (VIT B1) 100 MG
500 TABLET ORAL EVERY 8 HOURS
Refills: 0 | Status: COMPLETED | OUTPATIENT
Start: 2020-09-11 | End: 2020-09-14

## 2020-09-11 RX ORDER — THIAMINE MONONITRATE (VIT B1) 100 MG
500 TABLET ORAL ONCE
Refills: 0 | Status: COMPLETED | OUTPATIENT
Start: 2020-09-11 | End: 2020-09-11

## 2020-09-11 RX ORDER — QUETIAPINE FUMARATE 200 MG/1
100 TABLET, FILM COATED ORAL AT BEDTIME
Refills: 0 | Status: DISCONTINUED | OUTPATIENT
Start: 2020-09-11 | End: 2020-09-16

## 2020-09-11 RX ORDER — ONDANSETRON 8 MG/1
4 TABLET, FILM COATED ORAL ONCE
Refills: 0 | Status: COMPLETED | OUTPATIENT
Start: 2020-09-11 | End: 2020-09-11

## 2020-09-11 RX ADMIN — Medication 500 MILLIGRAM(S): at 12:00

## 2020-09-11 RX ADMIN — SODIUM CHLORIDE 1000 MILLILITER(S): 9 INJECTION INTRAMUSCULAR; INTRAVENOUS; SUBCUTANEOUS at 11:30

## 2020-09-11 RX ADMIN — SODIUM CHLORIDE 1000 MILLILITER(S): 9 INJECTION INTRAMUSCULAR; INTRAVENOUS; SUBCUTANEOUS at 10:22

## 2020-09-11 RX ADMIN — Medication 105 MILLIGRAM(S): at 21:06

## 2020-09-11 RX ADMIN — Medication 2 MILLIGRAM(S): at 10:21

## 2020-09-11 RX ADMIN — Medication 100 MILLIGRAM(S): at 10:13

## 2020-09-11 RX ADMIN — Medication 50 MILLIGRAM(S): at 21:05

## 2020-09-11 RX ADMIN — Medication 975 MILLIGRAM(S): at 13:31

## 2020-09-11 RX ADMIN — Medication 105 MILLIGRAM(S): at 11:00

## 2020-09-11 RX ADMIN — Medication 975 MILLIGRAM(S): at 15:49

## 2020-09-11 RX ADMIN — Medication 50 MILLIGRAM(S): at 15:42

## 2020-09-11 RX ADMIN — SERTRALINE 100 MILLIGRAM(S): 25 TABLET, FILM COATED ORAL at 21:05

## 2020-09-11 RX ADMIN — QUETIAPINE FUMARATE 100 MILLIGRAM(S): 200 TABLET, FILM COATED ORAL at 21:08

## 2020-09-11 RX ADMIN — SODIUM CHLORIDE 100 MILLILITER(S): 9 INJECTION, SOLUTION INTRAVENOUS at 16:50

## 2020-09-11 RX ADMIN — ONDANSETRON 4 MILLIGRAM(S): 8 TABLET, FILM COATED ORAL at 13:31

## 2020-09-11 NOTE — ED ADULT NURSE REASSESSMENT NOTE - NS ED NURSE REASSESS COMMENT FT1
MAR provider made aware of latest CIWA score of 13- states orders for ativan are incorrect and will change them. Awaiting new orders at this time. Pt calm and cooperative at this time. Will continue to monitor.

## 2020-09-11 NOTE — BEHAVIORAL HEALTH ASSESSMENT NOTE - RISK ASSESSMENT
Moderate Acute Suicide Risk Risk: male gender, substance use, undomiciled, passive SI, recent inpt stay  Protective: no hx of acting on SI    patient currently in withdrawal, will continue to monitor - cannot leave AMA due statements of SI

## 2020-09-11 NOTE — BEHAVIORAL HEALTH ASSESSMENT NOTE - ACTIVATING EVENTS/STRESSORS
Pending incarceration or homelessness/Current or pending social isolation/Substance intoxication or withdrawal/Legal problems Pending incarceration or homelessness/Current or pending social isolation/Acute medical problem/Substance intoxication or withdrawal/Legal problems

## 2020-09-11 NOTE — PROVIDER CONTACT NOTE (OTHER) - SITUATION
pt first CIWA score was 26, placed on 1:1 for SI, medicine PA assessed pt and recent CIWA was 1540 of 12.    provider states that pt doesn't need ICU consult or care at this time

## 2020-09-11 NOTE — BEHAVIORAL HEALTH ASSESSMENT NOTE - SUICIDE RISK FACTORS
Hopelessness or despair/Current mood episode/Alcohol/Substance abuse disorders/Insomnia/Impulsivity/Mood Disorder current/past Hopelessness or despair/Current mood episode/Alcohol/Substance abuse disorders/Impulsivity/Mood Disorder current/past

## 2020-09-11 NOTE — SBIRT NOTE ADULT - NSSBIRTUNABLESCROTHER_GEN_A_CORE
Unable to complete full screen due to SI. SBIRT available once psychiatrically cleared - [id number] Unable to complete full screen due to SI. SBIRT available once psychiatrically cleared - [id number]. Telephonic SBIRT -[phone] SW met with patient, patient declined to be screened. Patient informed SW he will be receiving substance services from his  and declined for SW to provide any resources.

## 2020-09-11 NOTE — H&P ADULT - ASSESSMENT
Patient is a 33 y/o male with hx of cocaine use and alcohol abuse who presents to the ED  for suicidal ideation and alcohol intoxication.

## 2020-09-11 NOTE — ED ADULT NURSE REASSESSMENT NOTE - NS ED NURSE REASSESS COMMENT FT1
Pt continues to be tremulous at this time, meds given at this time for headache and nausea. MAR provider made aware states she will be placing orders for patient to be medicated.

## 2020-09-11 NOTE — H&P ADULT - HISTORY OF PRESENT ILLNESS
Patient is a 35 y/o male with hx of cocaine use and alcohol abuse who presents to the ED  for suicidal ideation and alcohol intoxication. Since his discharge from [facility] he has not followed up and has been drinking excessively still daily. Patient states last drink was this morning with 1 beer. He has had long-standing SI without plan.  Pt states he takes Zoloft, Neurontin and Seroquel for depression but has not been compliant. Denies hx of DT’s/seizures or hallucinations. Endorses 2 prior SI attempts by attempting to ‘slit wrist’ and attempting to jump off a roof. In ED, he was given Ativan, Librium and normal saline with thiamine. Patient was placed on constant observation and Psychiatry was consulted for SI and alcohol withdraw. CIWA score currently 16 scoring high for tremors, with stable vitals. He denies nausea, vomiting nor diarrhea. No fever, chills, chest pain, cough or shortness of breath.

## 2020-09-11 NOTE — H&P ADULT - PROBLEM SELECTOR PLAN 1
Patient intoxicated. Ethanol level 73  -start on librium taper as per psych  -continue MVI, folic acid  - thiamine IV 500mg TID x 3 days  -Psych consult following

## 2020-09-11 NOTE — ED PROVIDER NOTE - OBJECTIVE STATEMENT
See attending attestation for course. See attending attestation for course.    YVETTE Lyons: Pt is a 33 yo male with hx of EtOH abuse, endorses last drink 1 hr ago with 1 beer.  Endorses long-standing SI without plan.  Pt states he takes Zoloft, Neurontin and Seroquel for depression.  Pt states that he was d/c’d 1 wk ago. Presented for SI and withdrawal today. Pt admits to prior SI attempts, states he cut his wrists and jumped off of a roof in the past, pt denies acute attempts or plan but endorses SI and states "I'll figure something out." Pt denies acute falls/trauma or any other sx or acute complaints. Pt arrived acutely tremulous, states he drank one beer earlier today.

## 2020-09-11 NOTE — H&P ADULT - ATTENDING COMMENTS
Alcohol withdrawal, CIWA protocol, Librium taper, Thiamine/MVI/Folate  Suicidal ideation, constant observation, decrease dose of Seroquel and hold Gabapentin to avoid oversedation, psych f/up  Elevated LFT's d/t alcohol use, monitor Alcohol withdrawal, CIWA protocol, Librium taper, Thiamine/MVI/Folate  Suicidal ideation, constant observation, decrease dose of Seroquel and hold Gabapentin to avoid oversedation, monitor QTc, psych f/up  Elevated LFT's d/t alcohol use, monitor

## 2020-09-11 NOTE — BEHAVIORAL HEALTH ASSESSMENT NOTE - ORIENTATION OTHER
patient aware he is in the hospital and that it is friday, denies he knows the date but will not try

## 2020-09-11 NOTE — ED PROVIDER NOTE - ATTENDING CONTRIBUTION TO CARE
Attending MD Ernandez.  Agree with above.  Pt is a 35 yo male with hx of EtOH abuse, endorses last drink 1 hr ago with 1 beer.  Endorses long-standing SI without plan.  Pt states he takes Zoloft, Neurontin and Seroquel for depression.  Pt states that he was d/c’d 1 wk ago. Presented for SI and withdrawal today.  Pt endorses hx of withdrawal sxs but denies hx of DT’s/seizures.  Pt denies hallucinations.  Pt is generally tremulous on exam.  Endorses 2 prior SI attempts by attempting to ‘slit wrist’ and attempting to ‘jump off a roof.

## 2020-09-11 NOTE — ED ADULT TRIAGE NOTE - CHIEF COMPLAINT QUOTE
c/o suicidal thoughts and withdrawal symptoms. Pt states has not taken psych meds x 1 week. Active tremors noted. in triage. Pt states drinks alcohol everyday. Last drink 1 hr ago. Hx suicide attempt, ETOH abuse, depression, anxiety

## 2020-09-11 NOTE — ED PROVIDER NOTE - CARE PLAN
Principal Discharge DX:	Alcohol withdrawal Principal Discharge DX:	Alcohol withdrawal  Secondary Diagnosis:	Suicidal ideations  Secondary Diagnosis:	Depression

## 2020-09-11 NOTE — BEHAVIORAL HEALTH ASSESSMENT NOTE - NSBHCHARTREVIEWLAB_PSY_A_CORE FT
14.1   11.22 )-----------( 257      ( 11 Sep 2020 10:10 )             41.6   09-11    141  |  101  |  13  ----------------------------<  82  3.9   |  26  |  0.65    Ca    8.9      11 Sep 2020 10:10    TPro  6.9  /  Alb  4.6  /  TBili  0.4  /  DBili  x   /  AST  59<H>  /  ALT  47<H>  /  AlkPhos  83  09-11

## 2020-09-11 NOTE — ED ADULT NURSE NOTE - INTERVENTIONS DEFINITIONS
Call bell, personal items and telephone within reach/Non-slip footwear when patient is off stretcher/Physically safe environment: no spills, clutter or unnecessary equipment/Stretcher in lowest position, wheels locked, appropriate side rails in place/Provide visual cue, wrist band, yellow gown, etc./Monitor for mental status changes and reorient to person, place, and time/Provide visual clues: red socks

## 2020-09-11 NOTE — BEHAVIORAL HEALTH ASSESSMENT NOTE - SUMMARY
Patient is a 33 y/o M, single, non-domiciled, unemployed, s/p court mandated rehab in 2012 for cocaine arrest, recent psych hospitalization at [facility] 9/1-9/4/2020 for ETOH and SI. Patient now presents to the ED a week later with self reported noncompliance with medication, 12-15beer/day use and withdrawal symptoms. Patient has hx of noncompliance and multiple inpt admissions with similar SI and depression complaints. CIwa currently 16 scoring high for tremors, with stable vitals.       Recommendations:   - librium taper 50mg q6hrs x 4 doses  50mg q8hrs x 3 doses  50mg q12 hrs x 2 doses  - decrease seroquel to 100mg qHS as patient noncompliant and to prevent oversedation.  HOLD IF PATIENT W SEVERE LETHARGY  - c/w Sertraline 100mg QD  - hold Gabapentin 300mg TID to prevent sedation at this time.   - continue with CO for SI  - thiamine IV 500mg TID x 3 days Patient is a 35 y/o M, single, non-domiciled, unemployed, s/p court mandated rehab in 2012 for cocaine arrest, recent psych hospitalization at [facility] 9/1-9/4/2020 for ETOH and SI. Patient now presents to the ED a week later with self reported noncompliance with medication, 12-15beer/day use and withdrawal symptoms. Patient has hx of noncompliance and multiple inpt admissions with similar SI and depression complaints. CIwa currently 16 scoring high for tremors, with stable vitals.       Recommendations:   - librium taper 50mg q6hrs x 4 doses  50mg q8hrs x 3 doses  50mg q12 hrs x 2 doses  50mg po qd x 1 dose  ****Monitor withdrawal sxs closely, monitor for oversedation, pulse OX and adjust taper accordingly*****  If pt. is scoring hign on CIWA, requiring multiple PRNs and shows vitals instability, may consider increasing the taper***  -Continue ATivan PRN as per CIWA protocol  - decrease Seroquel to 100mg qHS as patient noncompliant and to prevent oversedation.  (HOLD IF PATIENT W SEVERE LETHARGY)  - c/w Sertraline 100mg QD  - hold Gabapentin 300mg TID to prevent sedation at this time. Pt. has been non compliant  - continue with CO for SI  - thiamine IV 500mg TID x 3 days  -Monitor LFTs, mildly elevated.  -Pt. cannot leave AMA due to current suidality

## 2020-09-11 NOTE — BEHAVIORAL HEALTH ASSESSMENT NOTE - DETAILS
zh admission 9/1 dc 9/4/2020 chart reviewed has passive SI, no plan at this time, but does report some intent to act on thoughts Was incarcerated for second degree assault "I feel bad" Tremor Was incarcerated for second degree assault, denies current HI

## 2020-09-11 NOTE — ED ADULT NURSE REASSESSMENT NOTE - NS ED NURSE REASSESS COMMENT FT1
YVETTE Trujillo made aware of CIWA score of 13 and states okay to give librium now. He is aware it would be given approximately 20min earlier. YVETTE Trujillo states pt does not need ICU consult and okay to go to floor. YVETTE Trujillo made aware of CIWA score of 13 and states okay to give librium now. He is aware it would be given approximately 20min earlier. YVETTE Trujillo states he will call for an ICU consult at this time but states "I think he's okay to go to floor, I don't think ICU will take." YVETTE Trujillo made aware of CIWA score of 13 and states okay to give librium now. He is aware it would be given approximately 20min earlier. YVETTE Trujillo states "He is okay to go to floor." Report given to [facility].

## 2020-09-11 NOTE — H&P ADULT - NSHPPHYSICALEXAM_GEN_ALL_CORE
ICU Vital Signs Last 24 Hrs  T(C): 36.7 (11 Sep 2020 13:50), Max: 36.9 (11 Sep 2020 09:36)  T(F): 98 (11 Sep 2020 13:50), Max: 98.4 (11 Sep 2020 09:36)  HR: 80 (11 Sep 2020 13:50) (79 - 94)  BP: 126/79 (11 Sep 2020 13:50) (118/75 - 128/76)  BP(mean): --  ABP: --  ABP(mean): --  RR: 15 (11 Sep 2020 13:50) (15 - 19)  SpO2: 95% (11 Sep 2020 13:50) (95% - 100%)

## 2020-09-11 NOTE — BEHAVIORAL HEALTH ASSESSMENT NOTE - CASE SUMMARY
Patient seen and evaluated with Cristina Fall NP, I agree with above assessment and plan, pt. grossly oriented, not agitated, thought process is linear and coherent, patient states his mood is depressed, has no where to live, pt. reported  passive SI, no plan at this time, but does report some intent to act on thoughts. Pt. recently was discharged from [facility] inpatient, he reported noncompliance with medication, stated that he has been drinking 12-15beer/day. On exam, + tremors, no tongue fasciculation, HR: 80-85. Recommend to start Librium taper with Ativan PRN as per CIWA as written above,   continue other meds. as written above, case discussed with Ronnie in person, recs given will follow

## 2020-09-11 NOTE — BEHAVIORAL HEALTH ASSESSMENT NOTE - OTHER PAST PSYCHIATRIC HISTORY (INCLUDE DETAILS REGARDING ONSET, COURSE OF ILLNESS, INPATIENT/OUTPATIENT TREATMENT)
patient w hx of inpt admission sat [facility], most recent this month

## 2020-09-11 NOTE — ED PROVIDER NOTE - PROGRESS NOTE DETAILS
Attending MD Ernandez.  Pt's CIWA score 22.  He warrants admission for acute EtOH withdrawal and psych ot see inpt for eval for SI.  Pt dosed ativan and librium and stable for admission to medicine.  Pt to remain on 1:1 2/2 SI.

## 2020-09-11 NOTE — BEHAVIORAL HEALTH ASSESSMENT NOTE - NSBHCHARTREVIEWVS_PSY_A_CORE FT
Vital Signs Last 24 Hrs  T(C): 36.9 (11 Sep 2020 09:36), Max: 36.9 (11 Sep 2020 09:36)  T(F): 98.4 (11 Sep 2020 09:36), Max: 98.4 (11 Sep 2020 09:36)  HR: 85 (11 Sep 2020 12:30) (79 - 94)  BP: 118/75 (11 Sep 2020 12:21) (118/75 - 128/76)  BP(mean): --  RR: 16 (11 Sep 2020 12:21) (16 - 19)  SpO2: 100% (11 Sep 2020 12:21) (100% - 100%)

## 2020-09-11 NOTE — BEHAVIORAL HEALTH ASSESSMENT NOTE - VIOLENCE RISK FACTORS:
Substance abuse/History of violation of a legal mandate (e.g., parole, probation, AOT) Substance abuse/Impulsivity

## 2020-09-11 NOTE — BEHAVIORAL HEALTH ASSESSMENT NOTE - NSBHCHARTREVIEWINVESTIGATE_PSY_A_CORE FT
< from: 12 Lead ECG (08.25.20 @ 12:08) >    Ventricular Rate 63 BPM    Atrial Rate 63 BPM    P-R Interval 170 ms    QRS Duration 106 ms    Q-T Interval 436 ms    QTC Calculation(Bezet) 446 ms    P Axis 45 degrees    R Axis 75 degrees    T Axis 50 degrees    Diagnosis Line Normal sinus rhythmwith sinus arrhythmia  Normal ECG    < end of copied text >    FROM PERVIOUS ADMISSIOn

## 2020-09-11 NOTE — ED ADULT NURSE NOTE - NS ED NURSE LEVEL OF CONSCIOUSNESS MENTAL STATUS
Awake/Alert/Cooperative Advancement-Rotation Flap Text: The defect edges were debeveled with a #15 scalpel blade.  Given the location of the defect, shape of the defect and the proximity to free margins an advancement-rotation flap was deemed most appropriate.  Using a sterile surgical marker, an appropriate flap was drawn incorporating the defect and placing the expected incisions within the relaxed skin tension lines where possible. The area thus outlined was incised deep to adipose tissue with a #15 scalpel blade.  The skin margins were undermined to an appropriate distance in all directions utilizing iris scissors.

## 2020-09-11 NOTE — BEHAVIORAL HEALTH ASSESSMENT NOTE - HPI (INCLUDE ILLNESS QUALITY, SEVERITY, DURATION, TIMING, CONTEXT, MODIFYING FACTORS, ASSOCIATED SIGNS AND SYMPTOMS)
Patient is a 35 y/o M, single, non-domiciled, unemployed, s/p court mandated rehab in 2012 for cocaine arrest, recent psych hospitalization at [facility] 9/1-9/4/2020 for ETOH and SI. Patient now presents to the ED a week later with self reported noncompliance with medication, 12-15beer/day use and withdrawal symptoms. Patient has hx of noncompliance and multiple inpt admissions with similar SI and depression complaints.     Patient was seen and assessed at bedside.  This am patient scoring 26 on ciwa with tremors scoring 7 as per nursing staff.  Currently, patient scoring 16 and vitals are stable.  Visibly, patient is flushed, moist and does exhibit tremors.  Patient is without tongue fasciculations and although he reports being unaware of the date, he does know it is Friday and is able to hold logical and linear conversation. Denies other substance use. Patient states his mood id depressed, has no where to live and has passive SI with no plan.  He reports having a hx of passive SI with no actions "but I was close".  No HI reported.  Denies hallucinations. Denies a hx of gibson. Patient is a 35 y/o M, single, non-domiciled, unemployed, s/p court mandated rehab in 2012 for cocaine arrest, recent psych hospitalization at [facility] 9/1-9/4/2020 for ETOH and SI. Patient now presents to the ED a week later with self reported noncompliance with medication, 12-15beer/day use and withdrawal symptoms. Patient has hx of noncompliance and multiple inpt admissions with similar SI and depression complaints.     Patient was seen and assessed at bedside.  This am patient scoring 26 on ciwa with tremors scoring 7 as per nursing staff.  Currently, patient scoring 16 and vitals are stable.  Visibly, patient is flushed, moist and does exhibit tremors.  Patient is without tongue fasciculations and although he reports being unaware of the date, he does know it is Friday and is able to hold logical and linear conversation. Denies other substance use. Patient states his mood is depressed, has no where to live and has passive SI with no plan.  He reports having a hx of passive SI with no actions "but I was close".  No HI reported.  Denies hallucinations. Denies a hx of gibson.

## 2020-09-11 NOTE — ED ADULT NURSE NOTE - OBJECTIVE STATEMENT
Pt presents to rm 17, A&Ox4, ambulatory w/o assistance at baseline, pmhx of ETOH abuse, here for evaluation of alcohol withdrawal and SI, pt denies any active plan at this time but states "I will do it eventually, I'll find a way." Pt denies HI at this time. Pt appears to be very tremulous, diaphoretic, c/o headache and nausea at this time. States last drink was a beer 1hr ago but normally "I drink a lot everyday." Pt states "I haven't been taking my psych meds because I have been eating a lot." Pt becomes agitated at times but is verbally redirected. Denies chest pain, shortness of breath, palpitations, diaphoresis, fevers, dizziness, vomiting, diarrhea, or urinary symptoms at this time. IV established in left arm with a 18G, labs drawn and sent, call bell in reach, warm blanket provided, bed in lowest position, side rails up [place]or[place], MD evaluation in progress, pt on tele- NSR. Will continue to monitor.

## 2020-09-11 NOTE — H&P ADULT - PROBLEM SELECTOR PLAN 2
-Restart home psych meds per psych  -No capacity to leave AMA   -Constant observation 1:1 for SI  -gabapentin, Seroquel, hydroxyzine, sertraline  - Hold Gabapentin to prevent sedation at this time.

## 2020-09-12 LAB
ALBUMIN SERPL ELPH-MCNC: 3.5 G/DL — SIGNIFICANT CHANGE UP (ref 3.3–5)
ALP SERPL-CCNC: 68 U/L — SIGNIFICANT CHANGE UP (ref 40–120)
ALT FLD-CCNC: 30 U/L — SIGNIFICANT CHANGE UP (ref 4–41)
ANION GAP SERPL CALC-SCNC: 9 MMO/L — SIGNIFICANT CHANGE UP (ref 7–14)
AST SERPL-CCNC: 25 U/L — SIGNIFICANT CHANGE UP (ref 4–40)
BILIRUB SERPL-MCNC: 0.5 MG/DL — SIGNIFICANT CHANGE UP (ref 0.2–1.2)
BUN SERPL-MCNC: 11 MG/DL — SIGNIFICANT CHANGE UP (ref 7–23)
CALCIUM SERPL-MCNC: 8.3 MG/DL — LOW (ref 8.4–10.5)
CHLORIDE SERPL-SCNC: 104 MMOL/L — SIGNIFICANT CHANGE UP (ref 98–107)
CO2 SERPL-SCNC: 26 MMOL/L — SIGNIFICANT CHANGE UP (ref 22–31)
CREAT SERPL-MCNC: 0.59 MG/DL — SIGNIFICANT CHANGE UP (ref 0.5–1.3)
GLUCOSE SERPL-MCNC: 101 MG/DL — HIGH (ref 70–99)
HCT VFR BLD CALC: 41.7 % — SIGNIFICANT CHANGE UP (ref 39–50)
HGB BLD-MCNC: 14.1 G/DL — SIGNIFICANT CHANGE UP (ref 13–17)
INR BLD: 1.06 — SIGNIFICANT CHANGE UP (ref 0.88–1.16)
MAGNESIUM SERPL-MCNC: 1.8 MG/DL — SIGNIFICANT CHANGE UP (ref 1.6–2.6)
MCHC RBC-ENTMCNC: 33.7 PG — SIGNIFICANT CHANGE UP (ref 27–34)
MCHC RBC-ENTMCNC: 33.8 % — SIGNIFICANT CHANGE UP (ref 32–36)
MCV RBC AUTO: 99.8 FL — SIGNIFICANT CHANGE UP (ref 80–100)
NRBC # FLD: 0 K/UL — SIGNIFICANT CHANGE UP (ref 0–0)
PHOSPHATE SERPL-MCNC: 2.5 MG/DL — SIGNIFICANT CHANGE UP (ref 2.5–4.5)
PLATELET # BLD AUTO: 237 K/UL — SIGNIFICANT CHANGE UP (ref 150–400)
PMV BLD: 8.6 FL — SIGNIFICANT CHANGE UP (ref 7–13)
POTASSIUM SERPL-MCNC: 3.6 MMOL/L — SIGNIFICANT CHANGE UP (ref 3.5–5.3)
POTASSIUM SERPL-SCNC: 3.6 MMOL/L — SIGNIFICANT CHANGE UP (ref 3.5–5.3)
PROT SERPL-MCNC: 5.6 G/DL — LOW (ref 6–8.3)
PROTHROM AB SERPL-ACNC: 12.2 SEC — SIGNIFICANT CHANGE UP (ref 10.6–13.6)
RBC # BLD: 4.18 M/UL — LOW (ref 4.2–5.8)
RBC # FLD: 13.6 % — SIGNIFICANT CHANGE UP (ref 10.3–14.5)
SODIUM SERPL-SCNC: 139 MMOL/L — SIGNIFICANT CHANGE UP (ref 135–145)
WBC # BLD: 6.53 K/UL — SIGNIFICANT CHANGE UP (ref 3.8–10.5)
WBC # FLD AUTO: 6.53 K/UL — SIGNIFICANT CHANGE UP (ref 3.8–10.5)

## 2020-09-12 PROCEDURE — 99233 SBSQ HOSP IP/OBS HIGH 50: CPT

## 2020-09-12 RX ORDER — CYCLOBENZAPRINE HYDROCHLORIDE 10 MG/1
5 TABLET, FILM COATED ORAL ONCE
Refills: 0 | Status: COMPLETED | OUTPATIENT
Start: 2020-09-12 | End: 2020-09-12

## 2020-09-12 RX ORDER — GABAPENTIN 400 MG/1
100 CAPSULE ORAL THREE TIMES A DAY
Refills: 0 | Status: DISCONTINUED | OUTPATIENT
Start: 2020-09-12 | End: 2020-09-14

## 2020-09-12 RX ADMIN — Medication 1 MILLIGRAM(S): at 12:58

## 2020-09-12 RX ADMIN — Medication 50 MILLIGRAM(S): at 17:55

## 2020-09-12 RX ADMIN — Medication 105 MILLIGRAM(S): at 05:02

## 2020-09-12 RX ADMIN — GABAPENTIN 100 MILLIGRAM(S): 400 CAPSULE ORAL at 21:05

## 2020-09-12 RX ADMIN — QUETIAPINE FUMARATE 100 MILLIGRAM(S): 200 TABLET, FILM COATED ORAL at 21:05

## 2020-09-12 RX ADMIN — Medication 50 MILLIGRAM(S): at 02:45

## 2020-09-12 RX ADMIN — Medication 50 MILLIGRAM(S): at 09:00

## 2020-09-12 RX ADMIN — SERTRALINE 100 MILLIGRAM(S): 25 TABLET, FILM COATED ORAL at 12:58

## 2020-09-12 RX ADMIN — Medication 50 MILLIGRAM(S): at 21:04

## 2020-09-12 RX ADMIN — SODIUM CHLORIDE 100 MILLILITER(S): 9 INJECTION, SOLUTION INTRAVENOUS at 10:32

## 2020-09-12 RX ADMIN — Medication 105 MILLIGRAM(S): at 21:06

## 2020-09-12 NOTE — CHART NOTE - NSCHARTNOTEFT_GEN_A_CORE
1730- called Psychiatry and spoke to Dr Coon, to inquire if patient can receive Gabapentin. Patient requesting to take Gabapentin, which is a home medication for back pain. Dr Coon agreed that patient could resume his home medication Gabapentin with parameters, while being monitored. Will start at lowest dose and consider titrating up pending on response and effect that patient has. Patient advised and agreed with plan of care. Attending Physician updated.    uNzhat Arboleda, [facility]   Department of Medicine

## 2020-09-12 NOTE — PROGRESS NOTE ADULT - SUBJECTIVE AND OBJECTIVE BOX
[facility] Division of Hospital Medicine  Suzimonicagarth MckeonDO  Pager (M-F, 8A-5P): [phone]      Patient is a 34y old  Male who presents with a chief complaint of Alcohol withdrawal / SI (11 Sep 2020 12:43)      SUBJECTIVE / OVERNIGHT EVENTS: Pt seen at bedside, laying in bed, agitated when woken up. States another doctor was just in the room and asked the same questions. He states that he's been feeling down.   ADDITIONAL REVIEW OF SYSTEMS: not cooperative     MEDICATIONS  (STANDING):  QUEtiapine 100 milliGRAM(s) Oral at bedtime  sertraline 100 milliGRAM(s) Oral daily  sodium chloride 0.9% 1000 milliLiter(s) (100 mL/Hr) IV Continuous <Continuous>  thiamine IVPB 500 milliGRAM(s) IV Intermittent every 8 hours    MEDICATIONS  (PRN):  LORazepam     Tablet 2 milliGRAM(s) Oral every 2 hours PRN Symptom-triggered 2 point increase in CIWA-Ar      CAPILLARY BLOOD GLUCOSE        I&O's Summary      PHYSICAL EXAM:  Vital Signs Last 24 Hrs  T(C): 36.5 (12 Sep 2020 08:30), Max: 37.7 (12 Sep 2020 01:30)  T(F): 97.7 (12 Sep 2020 08:30), Max: 99.8 (12 Sep 2020 01:30)  HR: 61 (12 Sep 2020 12:30) (59 - 92)  BP: 97/59 (12 Sep 2020 12:30) (97/59 - 136/79)  BP(mean): --  RR: 16 (12 Sep 2020 12:30) (16 - 18)  SpO2: 97% (12 Sep 2020 12:30) (95% - 100%)  limited   CONSTITUTIONAL: NAD, laying in bed   RESPIRATORY: Normal respiratory effort; lungs are clear to auscultation bilaterally  CARDIOVASCULAR: Regular rate and rhythm, normal S1 and S2, no murmur/rub/gallop  PSYCH: agitated, but answers some questions   NEUROLOGY: nonfocal   SKIN: flushed     LABS:                        14.1   6.53  )-----------( 237      ( 12 Sep 2020 07:00 )             41.7     09-12    139  |  104  |  11  ----------------------------<  101<H>  3.6   |  26  |  0.59    Ca    8.3<L>      12 Sep 2020 07:00  Phos  2.5     09-12  Mg     1.8     09-12    TPro  5.6<L>  /  Alb  3.5  /  TBili  0.5  /  DBili  x   /  AST  25  /  ALT  30  /  AlkPhos  68  09-12    PT/INR - ( 12 Sep 2020 07:00 )   PT: 12.2 SEC;   INR: 1.06                      RADIOLOGY & ADDITIONAL TESTS:  Results Reviewed:   Imaging Personally Reviewed:  Electrocardiogram Personally Reviewed:    COORDINATION OF CARE:  Care Discussed with Consultants/Other Providers [Y/N]: Y  Prior or Outpatient Records Reviewed [Y/N]: Y   No

## 2020-09-12 NOTE — PROGRESS NOTE BEHAVIORAL HEALTH - ORIENTATION OTHER
difficult as pt became frustrated with orientation questions and made attempts to end interview early

## 2020-09-12 NOTE — PROGRESS NOTE BEHAVIORAL HEALTH - NSBHFUPINTERVALHXFT_PSY_A_CORE
Pt was irritable on interview. He states that he needs help but was unable to elaborate on what specific type of help he needed. He was adamant that he had intent to end his life. He did not have a plan but states that he would find a way to kill himself if, "you people let me out." Pt reports that he hasn't had much of an appetite and feel nauseated. Pt was grossly oriented to interview (I.e. oriented to name, was aware that he was in the hospital, needed medication, etc) became upset when the writer asked him additional situational orientation questions. Pt was mildly tremulous on exam.    He reports that prior to his current hospitalization he tried to jump off the roof but a friend stopped him.

## 2020-09-12 NOTE — PROGRESS NOTE BEHAVIORAL HEALTH - SUMMARY
Patient is a 35 y/o M, single, non-domiciled, unemployed, s/p court mandated rehab in 2012 for cocaine arrest, recent psych hospitalization at [facility] 9/1-9/4/2020 for ETOH and SI. Patient now presents to the ED a week later with self reported noncompliance with medication, 12-15beer/day use and withdrawal symptoms. Patient has hx of noncompliance and multiple inpt admissions with similar SI and depression complaints. Most recent CIWA score is 5 for headache, disorientation, and tremors.    Recommendations:   - librium taper 50mg q6hrs x 4 doses  50mg q8hrs x 3 doses  50mg q12 hrs x 2 doses  50mg po qd x 1 dose  ****Monitor withdrawal sxs closely, monitor for oversedation, pulse OX and adjust taper accordingly*****  If pt. is scoring high on CIWA, requiring multiple PRNs and shows vitals instability, may consider increasing the taper***  - Continue Ativan PRN as per CIWA protocol  - decrease Seroquel to 100mg qHS as patient noncompliant and to prevent oversedation.  (HOLD IF PATIENT W SEVERE LETHARGY)  - c/w Sertraline 100mg QD  - hold Gabapentin 300mg TID to prevent sedation at this time. Pt. has been non compliant  - continue with CO for SI  - thiamine IV 500mg TID x 3 days  - Monitor LFTs, mildly elevated

## 2020-09-12 NOTE — PROGRESS NOTE BEHAVIORAL HEALTH - RISK ASSESSMENT
Pt is at increased risk for suicide due to his current suicidal ideation and intent. Other risk factors include his male gender, recent discharge from a psychiatric hospital, recent aborted suicide attempt, multiple prior psychiatric admissions, history of substance use disorder, undomiciled status, unemployed status.   Protective factors include no current plan for suicide, current medical hospitalization with constant observation, and no known history of NSSIB.

## 2020-09-12 NOTE — PROGRESS NOTE ADULT - PROBLEM SELECTOR PLAN 2
-C/w Sertraline 100mg QD  -C/w decreased dose of Seroquel 100mg qhs   -No capacity to leave AMA   -Constant observation 1:1 for SI  -Hold Gabapentin to prevent sedation at this time.

## 2020-09-12 NOTE — PROGRESS NOTE ADULT - PROBLEM SELECTOR PLAN 1
Ethanol level 73 on admission   -C/w Librium taper per psych  -C/w MVI, folic acid  -C/w thiamine IV 500mg TID x 3 days, then transition to PO   -Psych following, appreciate recs

## 2020-09-13 PROCEDURE — 99233 SBSQ HOSP IP/OBS HIGH 50: CPT

## 2020-09-13 RX ORDER — ACETAMINOPHEN 500 MG
650 TABLET ORAL EVERY 6 HOURS
Refills: 0 | Status: DISCONTINUED | OUTPATIENT
Start: 2020-09-13 | End: 2020-09-16

## 2020-09-13 RX ADMIN — GABAPENTIN 100 MILLIGRAM(S): 400 CAPSULE ORAL at 06:25

## 2020-09-13 RX ADMIN — Medication 105 MILLIGRAM(S): at 06:26

## 2020-09-13 RX ADMIN — Medication 105 MILLIGRAM(S): at 14:24

## 2020-09-13 RX ADMIN — SERTRALINE 100 MILLIGRAM(S): 25 TABLET, FILM COATED ORAL at 08:20

## 2020-09-13 RX ADMIN — Medication 105 MILLIGRAM(S): at 22:11

## 2020-09-13 RX ADMIN — Medication 650 MILLIGRAM(S): at 17:30

## 2020-09-13 RX ADMIN — Medication 1 TABLET(S): at 13:02

## 2020-09-13 RX ADMIN — Medication 50 MILLIGRAM(S): at 17:30

## 2020-09-13 RX ADMIN — Medication 50 MILLIGRAM(S): at 06:25

## 2020-09-13 RX ADMIN — Medication 1 MILLIGRAM(S): at 13:02

## 2020-09-13 RX ADMIN — GABAPENTIN 100 MILLIGRAM(S): 400 CAPSULE ORAL at 13:02

## 2020-09-13 NOTE — PROGRESS NOTE ADULT - SUBJECTIVE AND OBJECTIVE BOX
[facility] Division of Hospital Medicine  Hector MckeonDO  Pager (M-F, 8A-5P): [phone]      Patient is a 34y old  Male who presents with a chief complaint of Alcohol withdrawal / SI (12 Sep 2020 14:54)      SUBJECTIVE / OVERNIGHT EVENTS:  ADDITIONAL REVIEW OF SYSTEMS:    MEDICATIONS  (STANDING):  gabapentin 100 milliGRAM(s) Oral three times a day  QUEtiapine 100 milliGRAM(s) Oral at bedtime  sertraline 100 milliGRAM(s) Oral daily  sodium chloride 0.9% 1000 milliLiter(s) (100 mL/Hr) IV Continuous <Continuous>  thiamine IVPB 500 milliGRAM(s) IV Intermittent every 8 hours    MEDICATIONS  (PRN):  LORazepam     Tablet 2 milliGRAM(s) Oral every 2 hours PRN Symptom-triggered 2 point increase in CIWA-Ar      CAPILLARY BLOOD GLUCOSE        I&O's Summary      PHYSICAL EXAM:  Vital Signs Last 24 Hrs  T(C): 36.4 (13 Sep 2020 06:30), Max: 36.7 (12 Sep 2020 14:30)  T(F): 97.6 (13 Sep 2020 06:30), Max: 98.1 (13 Sep 2020 06:20)  HR: 60 (13 Sep 2020 06:30) (58 - 66)  BP: 111/68 (13 Sep 2020 06:30) (99/59 - 131/89)  BP(mean): --  RR: 17 (13 Sep 2020 06:30) (16 - 17)  SpO2: 98% (13 Sep 2020 06:30) (97% - 100%)  CONSTITUTIONAL: NAD, well-developed, well-groomed  EYES: EOMI; conjunctiva and sclera clear  ENMT: Moist oral mucosa, no pharyngeal injection or exudates; normal dentition  NECK: Supple, no palpable masses; no thyromegaly  RESPIRATORY: Normal respiratory effort; lungs are clear to auscultation bilaterally  CARDIOVASCULAR: Regular rate and rhythm, normal S1 and S2, no murmur/rub/gallop; No lower extremity edema; Peripheral pulses are 2+ bilaterally  ABDOMEN: Nontender to palpation, normoactive bowel sounds, no rebound/guarding; No hepatosplenomegaly  MUSKULOSKELETAL:  no clubbing or cyanosis of digits; no joint swelling or tenderness to palpation  PSYCH: A+O to person, place, and time; affect appropriate  NEUROLOGY: CN 2-12 are intact and symmetric; no gross sensory deficits;   SKIN: No rashes; no palpable lesions    LABS:                        14.1   6.53  )-----------( 237      ( 12 Sep 2020 07:00 )             41.7     09-12    139  |  104  |  11  ----------------------------<  101<H>  3.6   |  26  |  0.59    Ca    8.3<L>      12 Sep 2020 07:00  Phos  2.5     09-12  Mg     1.8     09-12    TPro  5.6<L>  /  Alb  3.5  /  TBili  0.5  /  DBili  x   /  AST  25  /  ALT  30  /  AlkPhos  68  09-12    PT/INR - ( 12 Sep 2020 07:00 )   PT: 12.2 SEC;   INR: 1.06                      RADIOLOGY & ADDITIONAL TESTS:  Results Reviewed:   Imaging Personally Reviewed:  Electrocardiogram Personally Reviewed:    COORDINATION OF CARE:  Care Discussed with Consultants/Other Providers [Y/N]:  Prior or Outpatient Records Reviewed [Y/N]:   [facility] Division of Hospital Medicine  Hector Mckeon DO  Pager (M-F, 8A-5P): [phone]      Patient is a 34y old  Male who presents with a chief complaint of Alcohol withdrawal / SI (12 Sep 2020 14:54)      SUBJECTIVE / OVERNIGHT EVENTS: Pt seen at bedside, called multiple times, however does not want to respond. Per PCA, pt has been up and eating earlier, but usually just wants to sleep   ADDITIONAL REVIEW OF SYSTEMS: refusing to answer questions     MEDICATIONS  (STANDING):  gabapentin 100 milliGRAM(s) Oral three times a day  QUEtiapine 100 milliGRAM(s) Oral at bedtime  sertraline 100 milliGRAM(s) Oral daily  sodium chloride 0.9% 1000 milliLiter(s) (100 mL/Hr) IV Continuous <Continuous>  thiamine IVPB 500 milliGRAM(s) IV Intermittent every 8 hours    MEDICATIONS  (PRN):  LORazepam     Tablet 2 milliGRAM(s) Oral every 2 hours PRN Symptom-triggered 2 point increase in CIWA-Ar      CAPILLARY BLOOD GLUCOSE        I&O's Summary      PHYSICAL EXAM:  Vital Signs Last 24 Hrs  T(C): 36.4 (13 Sep 2020 06:30), Max: 36.7 (12 Sep 2020 14:30)  T(F): 97.6 (13 Sep 2020 06:30), Max: 98.1 (13 Sep 2020 06:20)  HR: 60 (13 Sep 2020 06:30) (58 - 66)  BP: 111/68 (13 Sep 2020 06:30) (99/59 - 131/89)  BP(mean): --  RR: 17 (13 Sep 2020 06:30) (16 - 17)  SpO2: 98% (13 Sep 2020 06:30) (97% - 100%)  PE: refused to engage in exam     LABS:                        14.1   6.53  )-----------( 237      ( 12 Sep 2020 07:00 )             41.7     09-12    139  |  104  |  11  ----------------------------<  101<H>  3.6   |  26  |  0.59    Ca    8.3<L>      12 Sep 2020 07:00  Phos  2.5     09-12  Mg     1.8     09-12    TPro  5.6<L>  /  Alb  3.5  /  TBili  0.5  /  DBili  x   /  AST  25  /  ALT  30  /  AlkPhos  68  09-12    PT/INR - ( 12 Sep 2020 07:00 )   PT: 12.2 SEC;   INR: 1.06                      RADIOLOGY & ADDITIONAL TESTS:  Results Reviewed:   Imaging Personally Reviewed:  Electrocardiogram Personally Reviewed:    COORDINATION OF CARE:  Care Discussed with Consultants/Other Providers [Y/N]:  Prior or Outpatient Records Reviewed [Y/N]:

## 2020-09-13 NOTE — PROGRESS NOTE BEHAVIORAL HEALTH - NSBHFUPINTERVALHXFT_PSY_A_CORE
Pt was restarted on home Gabapentin yesterday. CIWA scores 1-3 overnight for tremor, sweats, orientation, and/or anxiety. No PRNs required. This morning on evaluation patient is sleeping and awakens to verbal stimuli but declines interview. He says he wants to sleep and that all he is thinking about is sleep. Says he does not have SI right now because he is sleeping. Requests evaluation to occur later.

## 2020-09-13 NOTE — PROGRESS NOTE BEHAVIORAL HEALTH - SUMMARY
Patient is a 35 y/o M, single, non-domiciled, unemployed, s/p court mandated rehab in 2012 for cocaine arrest, recent psych hospitalization at [facility] 9/1-9/4/2020 for ETOH and SI. Patient now presents to the ED a week later with self reported noncompliance with medication, 12-15beer/day use and withdrawal symptoms. Patient has hx of noncompliance and multiple inpt admissions with similar SI and depression complaints. Most recent CIWA score is 5 for headache, disorientation, and tremors.    Recommendations:   - librium taper 50mg q6hrs x 4 doses  50mg q8hrs x 3 doses  50mg q12 hrs x 2 doses  50mg po qd x 1 dose  ****Monitor withdrawal sxs closely, monitor for oversedation, pulse OX and adjust taper accordingly*****  If pt. is scoring high on CIWA, requiring multiple PRNs and shows vitals instability, may consider increasing the taper***  - Continue Ativan PRN as per CIWA protocol  - decrease Seroquel to 100mg qHS as patient noncompliant and to prevent oversedation.  (HOLD IF PATIENT W SEVERE LETHARGY)  - c/w Sertraline 100mg QD  - can continue with Gabapentin 100 mg TID, titrate slowly and monitor for oversedation.   - continue with CO for SI  - thiamine IV 500mg TID x 3 days  - Monitor LFTs, mildly elevated    -Will continue to assess for need for psychiatric hospitalization once medically cleared.

## 2020-09-13 NOTE — PROGRESS NOTE ADULT - PROBLEM SELECTOR PLAN 2
-C/w Sertraline 100mg QD  -C/w decreased dose of Seroquel 100mg qhs   -No capacity to leave AMA   -Constant observation 1:1 for SI  -Hold Gabapentin to prevent sedation at this time. -C/w Sertraline 100mg QD  -C/w decreased dose of Seroquel 100mg qhs   -Resumed Gabapentin at 100mg TID for back pain/spasm, discussed with psych   -No capacity to leave AMA   -Constant observation 1:1 for SI -C/w Sertraline 100mg QD  -C/w decreased dose of Seroquel 100mg qhs   -C/w Gabapentin 100mg TID for back pain/spasm and behavior, can uptitrate slowly if needed (home dose 300mg TID) per psych   -No capacity to leave AMA   -Constant observation 1:1 for SI

## 2020-09-13 NOTE — PROGRESS NOTE ADULT - PROBLEM SELECTOR PLAN 3
-continue home meds as above  -utox positive for Benzo. -continue home meds as above  -utox positive for Benzo

## 2020-09-14 DIAGNOSIS — R00.1 BRADYCARDIA, UNSPECIFIED: ICD-10-CM

## 2020-09-14 DIAGNOSIS — F10.20 ALCOHOL DEPENDENCE, UNCOMPLICATED: ICD-10-CM

## 2020-09-14 DIAGNOSIS — F10.239 ALCOHOL DEPENDENCE WITH WITHDRAWAL, UNSPECIFIED: ICD-10-CM

## 2020-09-14 LAB
ALBUMIN SERPL ELPH-MCNC: 3.8 G/DL — SIGNIFICANT CHANGE UP (ref 3.3–5)
ALP SERPL-CCNC: 68 U/L — SIGNIFICANT CHANGE UP (ref 40–120)
ALT FLD-CCNC: 35 U/L — SIGNIFICANT CHANGE UP (ref 4–41)
ANION GAP SERPL CALC-SCNC: 11 MMO/L — SIGNIFICANT CHANGE UP (ref 7–14)
AST SERPL-CCNC: 26 U/L — SIGNIFICANT CHANGE UP (ref 4–40)
BILIRUB SERPL-MCNC: < 0.2 MG/DL — LOW (ref 0.2–1.2)
BUN SERPL-MCNC: 15 MG/DL — SIGNIFICANT CHANGE UP (ref 7–23)
CALCIUM SERPL-MCNC: 8.9 MG/DL — SIGNIFICANT CHANGE UP (ref 8.4–10.5)
CHLORIDE SERPL-SCNC: 102 MMOL/L — SIGNIFICANT CHANGE UP (ref 98–107)
CO2 SERPL-SCNC: 26 MMOL/L — SIGNIFICANT CHANGE UP (ref 22–31)
CREAT SERPL-MCNC: 0.71 MG/DL — SIGNIFICANT CHANGE UP (ref 0.5–1.3)
GLUCOSE SERPL-MCNC: 106 MG/DL — HIGH (ref 70–99)
MAGNESIUM SERPL-MCNC: 1.8 MG/DL — SIGNIFICANT CHANGE UP (ref 1.6–2.6)
PHOSPHATE SERPL-MCNC: 3.7 MG/DL — SIGNIFICANT CHANGE UP (ref 2.5–4.5)
POTASSIUM SERPL-MCNC: 4 MMOL/L — SIGNIFICANT CHANGE UP (ref 3.5–5.3)
POTASSIUM SERPL-SCNC: 4 MMOL/L — SIGNIFICANT CHANGE UP (ref 3.5–5.3)
PROT SERPL-MCNC: 6.2 G/DL — SIGNIFICANT CHANGE UP (ref 6–8.3)
SODIUM SERPL-SCNC: 139 MMOL/L — SIGNIFICANT CHANGE UP (ref 135–145)

## 2020-09-14 PROCEDURE — 99233 SBSQ HOSP IP/OBS HIGH 50: CPT

## 2020-09-14 RX ORDER — GABAPENTIN 400 MG/1
200 CAPSULE ORAL THREE TIMES A DAY
Refills: 0 | Status: DISCONTINUED | OUTPATIENT
Start: 2020-09-14 | End: 2020-09-15

## 2020-09-14 RX ORDER — GABAPENTIN 400 MG/1
100 CAPSULE ORAL ONCE
Refills: 0 | Status: COMPLETED | OUTPATIENT
Start: 2020-09-14 | End: 2020-09-14

## 2020-09-14 RX ORDER — LANOLIN ALCOHOL/MO/W.PET/CERES
3 CREAM (GRAM) TOPICAL AT BEDTIME
Refills: 0 | Status: DISCONTINUED | OUTPATIENT
Start: 2020-09-14 | End: 2020-09-16

## 2020-09-14 RX ADMIN — Medication 650 MILLIGRAM(S): at 21:04

## 2020-09-14 RX ADMIN — QUETIAPINE FUMARATE 100 MILLIGRAM(S): 200 TABLET, FILM COATED ORAL at 21:04

## 2020-09-14 RX ADMIN — Medication 1 MILLIGRAM(S): at 05:44

## 2020-09-14 RX ADMIN — Medication 105 MILLIGRAM(S): at 05:43

## 2020-09-14 RX ADMIN — GABAPENTIN 100 MILLIGRAM(S): 400 CAPSULE ORAL at 13:32

## 2020-09-14 RX ADMIN — SERTRALINE 100 MILLIGRAM(S): 25 TABLET, FILM COATED ORAL at 05:44

## 2020-09-14 RX ADMIN — Medication 105 MILLIGRAM(S): at 13:18

## 2020-09-14 RX ADMIN — Medication 1 TABLET(S): at 05:43

## 2020-09-14 RX ADMIN — GABAPENTIN 100 MILLIGRAM(S): 400 CAPSULE ORAL at 05:43

## 2020-09-14 RX ADMIN — Medication 3 MILLIGRAM(S): at 22:55

## 2020-09-14 RX ADMIN — Medication 650 MILLIGRAM(S): at 21:35

## 2020-09-14 RX ADMIN — GABAPENTIN 200 MILLIGRAM(S): 400 CAPSULE ORAL at 21:04

## 2020-09-14 RX ADMIN — GABAPENTIN 100 MILLIGRAM(S): 400 CAPSULE ORAL at 13:18

## 2020-09-14 NOTE — PROGRESS NOTE BEHAVIORAL HEALTH - NSBHFUPSUICINTERVALFT_PSY_A_CORE
reported SI , " I don't want to live like this, I just want to die", denies intent or plan to act on these thoughts while he is the hospital.
Superficially denies SI this morning because he was sleeping

## 2020-09-14 NOTE — PROGRESS NOTE ADULT - SUBJECTIVE AND OBJECTIVE BOX
Patient is a 34y old  Male who presents with a chief complaint of Alcohol withdrawal / SI (13 Sep 2020 08:44)      SUBJECTIVE / OVERNIGHT EVENTS: Pt seen and examined at 11:55am, no over night events, tele had srinivasa to 37 at 3:40am, reports that they are giving his meds incorrectly, reports taking zoloft and 300mg neurontin, denies any pain, or any other complaints. PCA at bedside.    MEDICATIONS  (STANDING):  folic acid 1 milliGRAM(s) Oral daily  gabapentin 200 milliGRAM(s) Oral three times a day  multivitamin 1 Tablet(s) Oral daily  QUEtiapine 100 milliGRAM(s) Oral at bedtime  sertraline 100 milliGRAM(s) Oral daily  sodium chloride 0.9% 1000 milliLiter(s) (100 mL/Hr) IV Continuous <Continuous>    MEDICATIONS  (PRN):  acetaminophen   Tablet .. 650 milliGRAM(s) Oral every 6 hours PRN Temp greater or equal to 38C (100.4F), Mild Pain (1 - 3), Moderate Pain (4 - 6)  LORazepam     Tablet 2 milliGRAM(s) Oral every 2 hours PRN Symptom-triggered 2 point increase in CIWA-Ar      Vital Signs Last 24 Hrs  T(C): 36.3 (14 Sep 2020 12:00), Max: 36.6 (14 Sep 2020 00:00)  T(F): 97.4 (14 Sep 2020 12:00), Max: 97.8 (14 Sep 2020 00:00)  HR: 69 (14 Sep 2020 12:00) (55 - 71)  BP: 113/67 (14 Sep 2020 12:00) (107/72 - 124/76)  BP(mean): --  RR: 16 (14 Sep 2020 12:00) (16 - 19)  SpO2: 98% (14 Sep 2020 12:00) (97% - 100%)  CAPILLARY BLOOD GLUCOSE        I&O's Summary      PHYSICAL EXAM:  GENERAL: NAD, well-developed  CHEST/LUNG: Clear to auscultation bilaterally; No wheeze  HEART: Regular rate and rhythm; No murmurs  ABDOMEN: Soft, Nontender, Nondistended  EXTREMITIES: no LE edema  PSYCH: Calm  NEUROLOGY: AAOx3  SKIN: No rashes or lesions    LABS:    09-14    139  |  102  |  15  ----------------------------<  106<H>  4.0   |  26  |  0.71    Ca    8.9      14 Sep 2020 05:47  Phos  3.7     09-14  Mg     1.8     09-14    TPro  6.2  /  Alb  3.8  /  TBili  < 0.2<L>  /  DBili  x   /  AST  26  /  ALT  35  /  AlkPhos  68  09-14              RADIOLOGY & ADDITIONAL TESTS:    Imaging Personally Reviewed:    Consultant(s) Notes Reviewed:      Care Discussed with Consultants/Other Providers:

## 2020-09-14 NOTE — PROVIDER CONTACT NOTE (OTHER) - ACTION/TREATMENT ORDERED:
Give librium (ED RN Gina giving it) and per YVETTE Trujillo, pt doesn't require ICU consult and can go to a medicine unit, continue to assess CIWA score for any acute changes.
RN educated pt on importance of taking medications. Patient states he will not take medications until dosage is changed. No further recommendations at this time. Will continue to monitor.

## 2020-09-14 NOTE — PROGRESS NOTE ADULT - PROBLEM SELECTOR PLAN 1
Ethanol level 73 on admission   -Completed Librium taper  -C/w MVI, folic acid  -C/w thiamine IV 500mg TID x 3 days, then transition to PO   -Psych following, appreciate recs  Discussed with psych attending, per psych attending pt said to look into placement at [facility] [place]

## 2020-09-14 NOTE — PROGRESS NOTE BEHAVIORAL HEALTH - RISK ASSESSMENT
Pt is at increased risk for suicide due to his current suicidal ideation and intent. Other risk factors include his male gender, recent discharge from a psychiatric hospital, recent aborted suicide attempt, multiple prior psychiatric admissions, history of substance use disorder, undomiciled status, unemployed status.   Protective factors include no current plan for suicide, current medical hospitalization with constant observation, and no known history of NSSIB.    -Will continue to assess for need for psychiatric hospitalization once medically cleared.

## 2020-09-14 NOTE — PROGRESS NOTE BEHAVIORAL HEALTH - NSBHCONSULTMEDAGITATION_PSY_A_CORE FT
as per [facility] protocol - ativan 2mg
As above
as per [facility] protocol - ativan 2mg

## 2020-09-14 NOTE — PROVIDER CONTACT NOTE (OTHER) - ASSESSMENT
pt is a/ox2-3, mumbling and not oriented to time at this time, CIWA of 12 from 1540,
Patient is in bed, frustrated that he has not been getting proper medications dosage for gabapentin and Seroquel. States he will not take a lesser dose.

## 2020-09-14 NOTE — PROGRESS NOTE BEHAVIORAL HEALTH - NSBHFUPINTERVALHXFT_PSY_A_CORE
No PRNs required overnight. HR: 62-71, CIWA :0-2, completed Librium Taper. Patient seen and evaluated, AAOX3, reported feeling sad and depressed as he has no place to live, stated that he needs a place to live, reported SI , " I don't want to live like this, I just want to die", denies intent or plan to act on these thoughts while he is the hospital. Denies AH and VH, no delusions elicited. Denies HI.     Patient stated that he was on Gabapentin 300mg TID and asked the team to increase the dose of gabapentin.

## 2020-09-14 NOTE — PROGRESS NOTE ADULT - PROBLEM SELECTOR PLAN 2
-C/w Sertraline 100mg QD  -C/w decreased dose of Seroquel 100mg qhs   -C/w Gabapentin 100mg TID for back pain/spasm and behavior, can uptitrate slowly if needed (home dose 300mg TID) per psych, will increase to 200mg tid for now  -No capacity to leave AMA   -Constant observation 1:1 for SI

## 2020-09-14 NOTE — PROVIDER CONTACT NOTE (OTHER) - RECOMMENDATIONS
Educate pt on importance of not taking Seroquel. Educate that gabapentin will help with muscle spasms

## 2020-09-15 LAB
ALBUMIN SERPL ELPH-MCNC: 4 G/DL — SIGNIFICANT CHANGE UP (ref 3.3–5)
ALP SERPL-CCNC: 68 U/L — SIGNIFICANT CHANGE UP (ref 40–120)
ALT FLD-CCNC: 42 U/L — HIGH (ref 4–41)
ANION GAP SERPL CALC-SCNC: 11 MMO/L — SIGNIFICANT CHANGE UP (ref 7–14)
AST SERPL-CCNC: 36 U/L — SIGNIFICANT CHANGE UP (ref 4–40)
BILIRUB SERPL-MCNC: 0.2 MG/DL — SIGNIFICANT CHANGE UP (ref 0.2–1.2)
BUN SERPL-MCNC: 17 MG/DL — SIGNIFICANT CHANGE UP (ref 7–23)
CALCIUM SERPL-MCNC: 8.8 MG/DL — SIGNIFICANT CHANGE UP (ref 8.4–10.5)
CHLORIDE SERPL-SCNC: 101 MMOL/L — SIGNIFICANT CHANGE UP (ref 98–107)
CO2 SERPL-SCNC: 25 MMOL/L — SIGNIFICANT CHANGE UP (ref 22–31)
CREAT SERPL-MCNC: 0.66 MG/DL — SIGNIFICANT CHANGE UP (ref 0.5–1.3)
GLUCOSE SERPL-MCNC: 126 MG/DL — HIGH (ref 70–99)
HCT VFR BLD CALC: 45.7 % — SIGNIFICANT CHANGE UP (ref 39–50)
HGB BLD-MCNC: 15.4 G/DL — SIGNIFICANT CHANGE UP (ref 13–17)
MAGNESIUM SERPL-MCNC: 1.9 MG/DL — SIGNIFICANT CHANGE UP (ref 1.6–2.6)
MCHC RBC-ENTMCNC: 33.7 % — SIGNIFICANT CHANGE UP (ref 32–36)
MCHC RBC-ENTMCNC: 33.8 PG — SIGNIFICANT CHANGE UP (ref 27–34)
MCV RBC AUTO: 100.4 FL — HIGH (ref 80–100)
NRBC # FLD: 0 K/UL — SIGNIFICANT CHANGE UP (ref 0–0)
PHOSPHATE SERPL-MCNC: 3.6 MG/DL — SIGNIFICANT CHANGE UP (ref 2.5–4.5)
PLATELET # BLD AUTO: 254 K/UL — SIGNIFICANT CHANGE UP (ref 150–400)
PMV BLD: 8.8 FL — SIGNIFICANT CHANGE UP (ref 7–13)
POTASSIUM SERPL-MCNC: 3.6 MMOL/L — SIGNIFICANT CHANGE UP (ref 3.5–5.3)
POTASSIUM SERPL-SCNC: 3.6 MMOL/L — SIGNIFICANT CHANGE UP (ref 3.5–5.3)
PROT SERPL-MCNC: 6.5 G/DL — SIGNIFICANT CHANGE UP (ref 6–8.3)
RBC # BLD: 4.55 M/UL — SIGNIFICANT CHANGE UP (ref 4.2–5.8)
RBC # FLD: 13.5 % — SIGNIFICANT CHANGE UP (ref 10.3–14.5)
SODIUM SERPL-SCNC: 137 MMOL/L — SIGNIFICANT CHANGE UP (ref 135–145)
WBC # BLD: 7.36 K/UL — SIGNIFICANT CHANGE UP (ref 3.8–10.5)
WBC # FLD AUTO: 7.36 K/UL — SIGNIFICANT CHANGE UP (ref 3.8–10.5)

## 2020-09-15 PROCEDURE — 99233 SBSQ HOSP IP/OBS HIGH 50: CPT

## 2020-09-15 RX ORDER — GABAPENTIN 400 MG/1
300 CAPSULE ORAL THREE TIMES A DAY
Refills: 0 | Status: DISCONTINUED | OUTPATIENT
Start: 2020-09-15 | End: 2020-09-15

## 2020-09-15 RX ORDER — GABAPENTIN 400 MG/1
200 CAPSULE ORAL THREE TIMES A DAY
Refills: 0 | Status: DISCONTINUED | OUTPATIENT
Start: 2020-09-15 | End: 2020-09-16

## 2020-09-15 RX ADMIN — GABAPENTIN 200 MILLIGRAM(S): 400 CAPSULE ORAL at 05:26

## 2020-09-15 RX ADMIN — Medication 1 TABLET(S): at 05:26

## 2020-09-15 RX ADMIN — Medication 3 MILLIGRAM(S): at 21:29

## 2020-09-15 RX ADMIN — GABAPENTIN 200 MILLIGRAM(S): 400 CAPSULE ORAL at 21:29

## 2020-09-15 RX ADMIN — Medication 1 MILLIGRAM(S): at 05:26

## 2020-09-15 RX ADMIN — SERTRALINE 100 MILLIGRAM(S): 25 TABLET, FILM COATED ORAL at 05:26

## 2020-09-15 RX ADMIN — GABAPENTIN 300 MILLIGRAM(S): 400 CAPSULE ORAL at 13:41

## 2020-09-15 RX ADMIN — QUETIAPINE FUMARATE 100 MILLIGRAM(S): 200 TABLET, FILM COATED ORAL at 21:29

## 2020-09-15 RX ADMIN — Medication 650 MILLIGRAM(S): at 18:03

## 2020-09-15 RX ADMIN — Medication 650 MILLIGRAM(S): at 19:02

## 2020-09-15 NOTE — PROGRESS NOTE ADULT - PROBLEM SELECTOR PLAN 2
-C/w Sertraline 100mg QD  -C/w decreased dose of Seroquel 100mg qhs   -C/w Gabapentin 100mg TID for back pain/spasm and behavior, can uptitrate slowly if needed (home dose 300mg TID) per psych, cont 200mg tid for now  -No capacity to leave AMA   -d/c Constant observation per psych

## 2020-09-15 NOTE — PROGRESS NOTE BEHAVIORAL HEALTH - RISK ASSESSMENT
Pt is at increased risk for suicide due to his current suicidal ideation and intent. Other risk factors include his male gender, recent discharge from a psychiatric hospital, recent aborted suicide attempt, multiple prior psychiatric admissions, history of substance use disorder, undomiciled status, unemployed status.   Protective factors include no current plan for suicide, current medical hospitalization with constant observation, and no known history of NSSIB.    - In my opinion, not at acute risk of harm to self or others, he is future oriented.

## 2020-09-15 NOTE — PROGRESS NOTE ADULT - PROBLEM SELECTOR PLAN 1
Ethanol level 73 on admission   -Completed Librium taper  -C/w MVI, folic acid  -C/w thiamine IV 500mg TID x 3 days, then transition to PO   -Psych following, appreciate recs  Discussed with psych attending, d/c 1:1 observation

## 2020-09-15 NOTE — PROGRESS NOTE ADULT - SUBJECTIVE AND OBJECTIVE BOX
Patient is a 34y old  Male who presents with a chief complaint of Alcohol withdrawal / SI (14 Sep 2020 14:21)      SUBJECTIVE / OVERNIGHT EVENTS: Pt seen and examined at 12:30pm along with psych attending and SW, no over night events, tele has srinivasa to 48/mt, denies any sob, chest pain, or any other complaints, initially said "may be" when asked about suicidal ideation later said that he does not have any suicidal ideation, agrees to go to shelter.        MEDICATIONS  (STANDING):  folic acid 1 milliGRAM(s) Oral daily  gabapentin 200 milliGRAM(s) Oral three times a day  multivitamin 1 Tablet(s) Oral daily  QUEtiapine 100 milliGRAM(s) Oral at bedtime  sertraline 100 milliGRAM(s) Oral daily  sodium chloride 0.9% 1000 milliLiter(s) (100 mL/Hr) IV Continuous <Continuous>    MEDICATIONS  (PRN):  acetaminophen   Tablet .. 650 milliGRAM(s) Oral every 6 hours PRN Temp greater or equal to 38C (100.4F), Mild Pain (1 - 3), Moderate Pain (4 - 6)  melatonin 3 milliGRAM(s) Oral at bedtime PRN Insomnia      Vital Signs Last 24 Hrs  T(C): 36.2 (15 Sep 2020 11:39), Max: 36.9 (14 Sep 2020 16:00)  T(F): 97.2 (15 Sep 2020 11:39), Max: 98.4 (14 Sep 2020 16:00)  HR: 62 (15 Sep 2020 11:39) (62 - 77)  BP: 93/54 (15 Sep 2020 11:39) (93/54 - 132/71)  BP(mean): --  RR: 18 (15 Sep 2020 11:39) (16 - 18)  SpO2: 100% (15 Sep 2020 11:39) (97% - 100%)  CAPILLARY BLOOD GLUCOSE        I&O's Summary      PHYSICAL EXAM:  GENERAL: NAD, well-developed  CHEST/LUNG: Clear to auscultation bilaterally; No wheeze  HEART: Regular rate and rhythm; No murmurs  ABDOMEN: Soft, Nontender, Nondistended  EXTREMITIES: no LE edema  PSYCH: Calm  NEUROLOGY: AAOx3  SKIN: No rashes or lesions      LABS:                        15.4   7.36  )-----------( 254      ( 15 Sep 2020 06:30 )             45.7     09-15    137  |  101  |  17  ----------------------------<  126<H>  3.6   |  25  |  0.66    Ca    8.8      15 Sep 2020 06:30  Phos  3.6     09-15  Mg     1.9     09-15    TPro  6.5  /  Alb  4.0  /  TBili  0.2  /  DBili  x   /  AST  36  /  ALT  42<H>  /  AlkPhos  68  09-15              RADIOLOGY & ADDITIONAL TESTS:    Imaging Personally Reviewed:    Consultant(s) Notes Reviewed:      Care Discussed with Consultants/Other Providers:

## 2020-09-15 NOTE — PROGRESS NOTE BEHAVIORAL HEALTH - NSBHFUPINTERVALHXFT_PSY_A_CORE
Received Melatonin PRN last night.  Patient seen and evaluated twice today, AAOX3, initially briefly engaged in interview, stated that " I am sleeping come back later". When asked about SI, stated that " I am feeling down as I have no place to live, I don't want to hurt myself"   Patient seen again with Dr. Waterman and SW present, somewhat irritable but overall cooperative. Team discussed about discharge planning, pt. stated that " I don't  want to go to [facility], Will shelter take me back". He denies current passive or active SI/I/P. Denies AH and VH, no delusions elicited. Denies HI.     Patient is future oriented, asked the team to start paperwork for disposition, also requesting to increase the gabapentin.

## 2020-09-15 NOTE — PROGRESS NOTE ADULT - PROBLEM SELECTOR PLAN 3
Persists to have bradycardia, also with low bp, discussed with psych attending about psych meds  will get cards consult to evaluate, ekg with sinus bradycardia, pt with no symptoms  plan discussed with ACP Persists to have bradycardia on tele, also with low bp, discussed with psych attending about psych meds  will get cards consult to evaluate, pt with no symptoms  plan discussed with ACP

## 2020-09-15 NOTE — CONSULT NOTE ADULT - ASSESSMENT
EKG SR Bradycardic QT/Qtc normal monitor     Assessment and lisandro     ) Bradycardia: asymptomatic , HR does go up with movement , monitor on tele c.w current meds , avoid AVnodal blockers      2) Suicidal thoughts t/t per psychiatry

## 2020-09-15 NOTE — PROGRESS NOTE BEHAVIORAL HEALTH - NSBHCONSULTOBSREASON_PSY_A_CORE FT
SI  cannot leave AMA
ongoing SI  cannot leave AMA
Adamantly denies SI, he is future oriented
SI  cannot leave AMA

## 2020-09-15 NOTE — CONSULT NOTE ADULT - SUBJECTIVE AND OBJECTIVE BOX
Cezar Gupta MD  Interventional Cardiology / Endovascular Specialist  [place] Office : [address] [place] [address] [place] [address]  Tel:   [place] Office : [address] [facility] [place] [address]  Tel: [phone]  Cell : [phone]    HISTORY OF PRESENTING ILLNESS:    33 y/o male with hx of cocaine use and alcohol abuse who presents to the ED  for suicidal ideation and alcohol intoxication. Since his discharge from [facility] he has not followed up and has been drinking excessively still daily. Patient states last drink was this morning with 1 beer. He has had long-standing SI without plan.  Pt states he takes Zoloft, Neurontin and Seroquel for depression but has not been compliant. Denies hx of DT’s/seizures or hallucinations. Endorses 2 prior SI attempts by attempting to ‘slit wrist’ and attempting to jump off a roof. In ED, he was given Ativan, Librium and normal saline with thiamine. Patient was placed on constant observation and Psychiatry was consulted for SI and alcohol withdraw. CIWA score currently 16 scoring high for tremors, with stable vitals. He denies nausea, vomiting nor diarrhea. No fever, chills, chest pain, cough or shortness of breath.   	  MEDICATIONS:        acetaminophen   Tablet .. 650 milliGRAM(s) Oral every 6 hours PRN  gabapentin 200 milliGRAM(s) Oral three times a day  melatonin 3 milliGRAM(s) Oral at bedtime PRN  QUEtiapine 100 milliGRAM(s) Oral at bedtime  sertraline 100 milliGRAM(s) Oral daily        folic acid 1 milliGRAM(s) Oral daily  multivitamin 1 Tablet(s) Oral daily  sodium chloride 0.9% 1000 milliLiter(s) IV Continuous <Continuous>      PAST MEDICAL/SURGICAL HISTORY  PAST MEDICAL & SURGICAL HISTORY:  Alcohol abuse    No significant past surgical history        SOCIAL HISTORY: Substance Use (street drugs): ( x ) never used  (  ) other:    FAMILY HISTORY:  No pertinent family history in first degree relatives        REVIEW OF SYSTEMS:  CONSTITUTIONAL: No fever, weight loss, or fatigue  EYES: No eye pain, visual disturbances, or discharge  ENMT:  No difficulty hearing, tinnitus, vertigo; No sinus or throat pain  BREASTS: No pain, masses, or nipple discharge  GASTROINTESTINAL: No abdominal or epigastric pain. No nausea, vomiting, or hematemesis; No diarrhea or constipation. No melena or hematochezia.  GENITOURINARY: No dysuria, frequency, hematuria, or incontinence  NEUROLOGICAL: No headaches, memory loss, loss of strength, numbness, or tremors  ENDOCRINE: No heat or cold intolerance; No hair loss  MUSCULOSKELETAL: No joint pain or swelling; No muscle, back, or extremity pain  PSYCHIATRIC: No depression, anxiety, mood swings, or difficulty sleeping  HEME/LYMPH: No easy bruising, or bleeding gums  All others negative    PHYSICAL EXAM:  T(C): 36.6 (09-15-20 @ 21:27), Max: 36.6 (09-15-20 @ 21:27)  HR: 61 (09-15-20 @ 21:27) (61 - 75)  BP: 119/79 (09-15-20 @ 21:27) (93/54 - 119/79)  RR: 16 (09-15-20 @ 21:27) (16 - 18)  SpO2: 100% (09-15-20 @ 21:27) (97% - 100%)  Wt(kg): --  I&O's Summary        GENERAL: NAD, lethatgic  EYES: EOMI, PERRLA, conjunctiva and sclera clear  ENMT: No tonsillar erythema, exudates, or enlargement; Moist mucous membranes, Good dentition, No lesions  Cardiovascular: Normal S1 S2, No JVD, No murmurs, No edema  Respiratory: Lungs clear to auscultation	  Gastrointestinal:  Soft, Non-tender, + BS	  Extremities: Normal range of motion, No clubbing, cyanosis or edema                                15.4   7.36  )-----------( 254      ( 15 Sep 2020 06:30 )             45.7     09-15    137  |  101  |  17  ----------------------------<  126<H>  3.6   |  25  |  0.66    Ca    8.8      15 Sep 2020 06:30  Phos  3.6     09-15  Mg     1.9     09-15    TPro  6.5  /  Alb  4.0  /  TBili  0.2  /  DBili  x   /  AST  36  /  ALT  42<H>  /  AlkPhos  68  09-15    proBNP:   Lipid Profile:   HgA1c:   TSH:     Consultant(s) Notes Reviewed:  [x ] YES  [ ] NO    Care Discussed with Consultants/Other Providers [ x] YES  [ ] NO    Imaging Personally Reviewed independently:  [x] YES  [ ] NO    All labs, radiologic studies, vitals, orders and medications list reviewed. Patient is seen and examined at bedside. Case discussed with medical team.

## 2020-09-15 NOTE — PROGRESS NOTE BEHAVIORAL HEALTH - SUMMARY
Patient is a 33 y/o M, single, non-domiciled, unemployed, s/p court mandated rehab in 2012 for cocaine arrest, recent psych hospitalization at [facility] 9/1-9/4/2020 for ETOH and SI. Patient now presents to the ED a week later with self reported noncompliance with medication, 12-15beer/day use and withdrawal symptoms. Patient has hx of noncompliance and multiple inpt admissions with similar SI and depression complaints. Psychiatry consulted to alcohol withdrawal and SI.     9/14: No PRNs required overnight. HR: 62-71, CIWA :0-2, completed Librium Taper. Patient AAOX3, reported feeling sad and depressed as he has no place to live, stated that he needs a place to live, reported SI , " I don't want to live like this, I just want to die", denies intent or plan to act on these thoughts while he is the hospital. Denies AH and VH, no delusions elicited. Denies HI.     9/15; Pt. AAOX3, somewhat irritable but cooperative, denies SI and HI, denies acute psychotic sxs, he is treatment seeking and future oriented.     Recommendations:   - librium taper completed  - Continue Seroquel 100mg po qHS  (HOLD IF PATIENT W SEVERE LETHARGY)  - c/w Sertraline 100mg QD  - Increase Gabapentin 300 mg po TID, titrate slowly and monitor for oversedation.   - thiamine IV 500mg TID x 3 days  -CAMERON assistance needed for placement  -Case discussed with CAMERON Hudson  and YVETTE Monte in person

## 2020-09-15 NOTE — PROGRESS NOTE BEHAVIORAL HEALTH - OTHER
not assessed in bed "I am feeling OK, but down as I have no place to live" treatment seeking, future oriented, NO SI or HI reported today

## 2020-09-16 VITALS
SYSTOLIC BLOOD PRESSURE: 110 MMHG | RESPIRATION RATE: 18 BRPM | DIASTOLIC BLOOD PRESSURE: 78 MMHG | TEMPERATURE: 98 F | OXYGEN SATURATION: 98 % | HEART RATE: 86 BPM

## 2020-09-16 LAB
ALBUMIN SERPL ELPH-MCNC: 4 G/DL — SIGNIFICANT CHANGE UP (ref 3.3–5)
ALP SERPL-CCNC: 69 U/L — SIGNIFICANT CHANGE UP (ref 40–120)
ALT FLD-CCNC: 51 U/L — HIGH (ref 4–41)
ANION GAP SERPL CALC-SCNC: 10 MMO/L — SIGNIFICANT CHANGE UP (ref 7–14)
AST SERPL-CCNC: 39 U/L — SIGNIFICANT CHANGE UP (ref 4–40)
BILIRUB SERPL-MCNC: 0.2 MG/DL — SIGNIFICANT CHANGE UP (ref 0.2–1.2)
BUN SERPL-MCNC: 19 MG/DL — SIGNIFICANT CHANGE UP (ref 7–23)
CALCIUM SERPL-MCNC: 9.1 MG/DL — SIGNIFICANT CHANGE UP (ref 8.4–10.5)
CHLORIDE SERPL-SCNC: 101 MMOL/L — SIGNIFICANT CHANGE UP (ref 98–107)
CO2 SERPL-SCNC: 27 MMOL/L — SIGNIFICANT CHANGE UP (ref 22–31)
CREAT SERPL-MCNC: 0.77 MG/DL — SIGNIFICANT CHANGE UP (ref 0.5–1.3)
GLUCOSE SERPL-MCNC: 121 MG/DL — HIGH (ref 70–99)
HCT VFR BLD CALC: 46.4 % — SIGNIFICANT CHANGE UP (ref 39–50)
HGB BLD-MCNC: 15.4 G/DL — SIGNIFICANT CHANGE UP (ref 13–17)
MAGNESIUM SERPL-MCNC: 2 MG/DL — SIGNIFICANT CHANGE UP (ref 1.6–2.6)
MCHC RBC-ENTMCNC: 33.2 % — SIGNIFICANT CHANGE UP (ref 32–36)
MCHC RBC-ENTMCNC: 33.6 PG — SIGNIFICANT CHANGE UP (ref 27–34)
MCV RBC AUTO: 101.3 FL — HIGH (ref 80–100)
NRBC # FLD: 0 K/UL — SIGNIFICANT CHANGE UP (ref 0–0)
PHOSPHATE SERPL-MCNC: 3.4 MG/DL — SIGNIFICANT CHANGE UP (ref 2.5–4.5)
PLATELET # BLD AUTO: 225 K/UL — SIGNIFICANT CHANGE UP (ref 150–400)
PMV BLD: 8.7 FL — SIGNIFICANT CHANGE UP (ref 7–13)
POTASSIUM SERPL-MCNC: 3.8 MMOL/L — SIGNIFICANT CHANGE UP (ref 3.5–5.3)
POTASSIUM SERPL-SCNC: 3.8 MMOL/L — SIGNIFICANT CHANGE UP (ref 3.5–5.3)
PROT SERPL-MCNC: 6.4 G/DL — SIGNIFICANT CHANGE UP (ref 6–8.3)
RBC # BLD: 4.58 M/UL — SIGNIFICANT CHANGE UP (ref 4.2–5.8)
RBC # FLD: 13.4 % — SIGNIFICANT CHANGE UP (ref 10.3–14.5)
SODIUM SERPL-SCNC: 138 MMOL/L — SIGNIFICANT CHANGE UP (ref 135–145)
T4 FREE SERPL-MCNC: 0.86 NG/DL — LOW (ref 0.9–1.8)
TSH SERPL-MCNC: 3.61 UIU/ML — SIGNIFICANT CHANGE UP (ref 0.27–4.2)
WBC # BLD: 8.43 K/UL — SIGNIFICANT CHANGE UP (ref 3.8–10.5)
WBC # FLD AUTO: 8.43 K/UL — SIGNIFICANT CHANGE UP (ref 3.8–10.5)

## 2020-09-16 PROCEDURE — 99232 SBSQ HOSP IP/OBS MODERATE 35: CPT

## 2020-09-16 PROCEDURE — 99239 HOSP IP/OBS DSCHRG MGMT >30: CPT

## 2020-09-16 RX ORDER — GABAPENTIN 400 MG/1
200 CAPSULE ORAL THREE TIMES A DAY
Refills: 0 | Status: DISCONTINUED | OUTPATIENT
Start: 2020-09-16 | End: 2020-09-16

## 2020-09-16 RX ORDER — GABAPENTIN 400 MG/1
2 CAPSULE ORAL
Qty: 42 | Refills: 0
Start: 2020-09-16 | End: 2020-09-22

## 2020-09-16 RX ORDER — QUETIAPINE FUMARATE 200 MG/1
1 TABLET, FILM COATED ORAL
Qty: 0 | Refills: 0 | DISCHARGE
Start: 2020-09-16

## 2020-09-16 RX ORDER — LANOLIN ALCOHOL/MO/W.PET/CERES
1 CREAM (GRAM) TOPICAL
Qty: 7 | Refills: 0
Start: 2020-09-16 | End: 2020-09-22

## 2020-09-16 RX ORDER — QUETIAPINE FUMARATE 200 MG/1
1 TABLET, FILM COATED ORAL
Qty: 7 | Refills: 0
Start: 2020-09-16 | End: 2020-09-22

## 2020-09-16 RX ORDER — GABAPENTIN 400 MG/1
300 CAPSULE ORAL THREE TIMES A DAY
Refills: 0 | Status: DISCONTINUED | OUTPATIENT
Start: 2020-09-16 | End: 2020-09-16

## 2020-09-16 RX ORDER — SERTRALINE 25 MG/1
1 TABLET, FILM COATED ORAL
Qty: 7 | Refills: 0
Start: 2020-09-16 | End: 2020-09-22

## 2020-09-16 RX ORDER — GABAPENTIN 400 MG/1
2 CAPSULE ORAL
Qty: 0 | Refills: 0 | DISCHARGE
Start: 2020-09-16

## 2020-09-16 RX ADMIN — GABAPENTIN 200 MILLIGRAM(S): 400 CAPSULE ORAL at 13:08

## 2020-09-16 RX ADMIN — Medication 1 MILLIGRAM(S): at 05:07

## 2020-09-16 RX ADMIN — Medication 1 TABLET(S): at 05:08

## 2020-09-16 RX ADMIN — GABAPENTIN 200 MILLIGRAM(S): 400 CAPSULE ORAL at 05:08

## 2020-09-16 RX ADMIN — SERTRALINE 100 MILLIGRAM(S): 25 TABLET, FILM COATED ORAL at 05:08

## 2020-09-16 NOTE — PROGRESS NOTE BEHAVIORAL HEALTH - ABNORMAL MOVEMENTS
No abnormal movements
Tremors/Other

## 2020-09-16 NOTE — PROGRESS NOTE BEHAVIORAL HEALTH - NSBHCONSULTMEDSEVERE_PSY_A_CORE FT
May use Haldol 2.5mg PO/Im/IV q6h prn for severe agitation if qtc <500

## 2020-09-16 NOTE — DISCHARGE NOTE PROVIDER - CARE PROVIDER_API CALL
Cezar Gupta (MD)  Cardiovascular Disease; Internal Medicine  [address] [place] [address]  Phone: [phone]  Fax: [phone]  Follow Up Time: 1 week

## 2020-09-16 NOTE — PROGRESS NOTE BEHAVIORAL HEALTH - NSBHFUPREASONCONS_PSY_A_CORE
med management/alcohol
med management/alcohol
suicidality/alcohol

## 2020-09-16 NOTE — PROGRESS NOTE ADULT - SUBJECTIVE AND OBJECTIVE BOX
Patient is a 34y old  Male who presents with a chief complaint of Alcohol withdrawal / SI (16 Sep 2020 12:12)      SUBJECTIVE / OVERNIGHT EVENTS: Pt seen and examined at 11:50am, no over night events, waiting to take a shower, denies any complaints, agrees to go to shelter.  Addendum: seen again with Psych attending, CAMERON and myself, pt does not want to wait for approval from shelter, does not want to wait, wants to go to his aunt's house.    MEDICATIONS  (STANDING):  folic acid 1 milliGRAM(s) Oral daily  gabapentin 200 milliGRAM(s) Oral three times a day  multivitamin 1 Tablet(s) Oral daily  QUEtiapine 100 milliGRAM(s) Oral at bedtime  sertraline 100 milliGRAM(s) Oral daily  sodium chloride 0.9% 1000 milliLiter(s) (100 mL/Hr) IV Continuous <Continuous>    MEDICATIONS  (PRN):  acetaminophen   Tablet .. 650 milliGRAM(s) Oral every 6 hours PRN Temp greater or equal to 38C (100.4F), Mild Pain (1 - 3), Moderate Pain (4 - 6)  melatonin 3 milliGRAM(s) Oral at bedtime PRN Insomnia      Vital Signs Last 24 Hrs  T(C): 36.5 (16 Sep 2020 12:03), Max: 36.7 (16 Sep 2020 05:05)  T(F): 97.7 (16 Sep 2020 12:03), Max: 98.1 (16 Sep 2020 05:05)  HR: 86 (16 Sep 2020 12:03) (61 - 86)  BP: 110/78 (16 Sep 2020 12:03) (110/78 - 119/79)  BP(mean): --  RR: 18 (16 Sep 2020 12:03) (16 - 18)  SpO2: 98% (16 Sep 2020 12:03) (98% - 100%)  CAPILLARY BLOOD GLUCOSE        I&O's Summary      PHYSICAL EXAM:  GENERAL: NAD, well-developed  CHEST/LUNG: Clear to auscultation bilaterally; No wheeze  HEART: Regular rate and rhythm; No murmurs  ABDOMEN: Soft, Nontender, Nondistended  EXTREMITIES: no LE edema  PSYCH: Calm  NEUROLOGY: AAOx3  SKIN: No rashes or lesions        LABS:                        15.4   8.43  )-----------( 225      ( 16 Sep 2020 06:19 )             46.4     09-16    138  |  101  |  19  ----------------------------<  121<H>  3.8   |  27  |  0.77    Ca    9.1      16 Sep 2020 06:19  Phos  3.4     09-16  Mg     2.0     09-16    TPro  6.4  /  Alb  4.0  /  TBili  0.2  /  DBili  x   /  AST  39  /  ALT  51<H>  /  AlkPhos  69  09-16              RADIOLOGY & ADDITIONAL TESTS:    Imaging Personally Reviewed:    Consultant(s) Notes Reviewed:      Care Discussed with Consultants/Other Providers:

## 2020-09-16 NOTE — PROGRESS NOTE BEHAVIORAL HEALTH - NSBHCONSULTFOLLOWAFTERCARE_PSY_A_CORE FT
Patient to follow-up with [facility] outpatient clinic that YANETH referred him to after his previous [facility] hospitalization Follow up at [facility] outpatient [facility] Phone #: [phone]. Address: [address] [place] [address]. Pt. stated that he would like to make an appointment himself.

## 2020-09-16 NOTE — PROGRESS NOTE BEHAVIORAL HEALTH - NSBHFUPINTERVALHXFT_PSY_A_CORE
Received PRN Melatonin 3mg last night. Patient was AAOX3, in pleasant mood and cooperative. He denies feeling depressed or irritable, denies SI, AH, or VH. He is treatment seeking and future oriented. However, he wants to leave hospital soon and does not want to wait for shelter access to be coordinated by . Instead, he wants to leave and go to his aunt's house. No tremor or diaphoresis appreciated. Received PRN Melatonin 3mg last night. Patient was AAOX3, in pleasant mood and cooperative. He denies feeling depressed or irritable, denies SI, AH, or VH. He is treatment seeking and future oriented. Denies HI. No tremor or diaphoresis appreciated.  Pt. also reported that he made suicidal statements in the past in frustration as he has no place to live, stated that he was just upset, he did not mean it.

## 2020-09-16 NOTE — PROGRESS NOTE ADULT - ASSESSMENT
34M w/hx of cocaine use and alcohol abuse who presents to the ED  for suicidal ideation and alcohol intoxication.
EKG SR Bradycardic QT/Qtc normal monitor     Assessment and lisandro     1) ) Bradycardia: asymptomatic , HR does go up with movement , monitor on tele c.w current meds , avoid AVnodal blockers      2)  Suicidal thoughts t/t per psychiatry     3) DEVT PPX recommend lovenox 
34M w/hx of cocaine use and alcohol abuse who presents to the ED  for suicidal ideation and alcohol intoxication.
34M w/hx of cocaine use and alcohol abuse who presents to the ED  for suicidal ideation and alcohol intoxication.

## 2020-09-16 NOTE — PROGRESS NOTE BEHAVIORAL HEALTH - NSBHCONSULTOBS_PSY_A_CORE
Constant observation
Constant observation
Routine observation
Routine observation
Constant observation

## 2020-09-16 NOTE — PROGRESS NOTE BEHAVIORAL HEALTH - SUMMARY
Summary (include case differential, formulation and patient response to therapy): Patient is a 35 y/o M, single, non-domiciled, unemployed, s/p court mandated rehab in 2012 for cocaine arrest, recent psych hospitalization at [facility] 9/1-9/4/2020 for ETOH and SI. Patient now presents to the ED a week later with self reported noncompliance with medication, 12-15beer/day use and withdrawal symptoms. Patient has hx of noncompliance and multiple inpt admissions with similar SI and depression complaints. Psychiatry consulted to alcohol withdrawal and SI.     9/14: No PRNs required overnight. HR: 62-71, CIWA :0-2, completed Librium Taper. Patient AAOX3, reported feeling sad and depressed as he has no place to live, stated that he needs a place to live, reported SI , " I don't want to live like this, I just want to die", denies intent or plan to act on these thoughts while he is the hospital. Denies AH and VH, no delusions elicited. Denies HI.     9/15; Pt. AAOX3, somewhat irritable but cooperative, denies SI and HI, denies acute psychotic sxs, he is treatment seeking and future oriented.     9/16: Pt. AAOX3, in pleasant mood and cooperative. Denies SI and HI, denies acute psychotic sxs, he is treatment seeking and future oriented. However, he wants to leave hospital soon and does not want to wait for shelter access to be coordinated by .    Plan:  1. Patient does not meet criteria for involuntary admission to [facility], and there are no Psychiatric Contraindications to discharge; if patient does not want to wait for shelter access to be coordinated, patient will be discharged and will have to figure out his own accomodations. Paperwork is ready.  2. Patient will be provided address and phone number for the [facility] outpatient clinic that he was scheduled to visit on 9/9, but missed the appointment for. Phone #: [phone]. Address: [address] [place] [address].  3. c/w the following medication regimen until discharged:  - Seroquel 100mg po qHS  (HOLD IF PATIENT W SEVERE LETHARGY)  - Sertraline 100mg QD  - Gabapentin 300 mg po TID, monitor for oversedation.  -PRN Melatonin 3mg PO qhs Summary (include case differential, formulation and patient response to therapy): Patient is a 35 y/o M, single, non-domiciled, unemployed, s/p court mandated rehab in 2012 for cocaine arrest, recent psych hospitalization at [facility] 9/1-9/4/2020 for ETOH and SI. Patient now presents to the ED a week later with self reported noncompliance with medication, 12-15beer/day use and withdrawal symptoms. Patient has hx of noncompliance and multiple inpt admissions with similar SI and depression complaints. Psychiatry consulted to alcohol withdrawal and SI.     9/14: No PRNs required overnight. HR: 62-71, CIWA :0-2, completed Librium Taper. Patient AAOX3, reported feeling sad and depressed as he has no place to live, stated that he needs a place to live, reported SI , " I don't want to live like this, I just want to die", denies intent or plan to act on these thoughts while he is the hospital. Denies AH and VH, no delusions elicited. Denies HI.     9/15; Pt. AAOX3, somewhat irritable but cooperative, denies SI and HI, denies acute psychotic sxs, he is treatment seeking and future oriented.     9/16: Pt. AAOX3, in pleasant mood and cooperative. Denies SI and HI, denies acute psychotic sxs, he is treatment seeking and future oriented. Patient is amenable to go to the [facility] and wants to follow up at [facility]    Plan:  1- Patient will be provided address and phone number for the [facility] outpatient clinic Phone #: [phone]. Address: [address] [place] [address]. (this information was provided to primray team YVETTE Loco)   2- Seroquel 100mg po qHS  (HOLD IF PATIENT W SEVERE LETHARGY)  3- Sertraline 100mg QD  4- Gabapentin 200 mg po TID, monitor for oversedation.  5-PRN Melatonin 3mg PO qhs

## 2020-09-16 NOTE — PROGRESS NOTE ADULT - REASON FOR ADMISSION
Alcohol withdrawal / SI

## 2020-09-16 NOTE — PROGRESS NOTE ADULT - PROBLEM SELECTOR PROBLEM 4
Multiple substance abuse
Multiple substance abuse
Need for prophylactic measure
Multiple substance abuse
Need for prophylactic measure

## 2020-09-16 NOTE — PROGRESS NOTE BEHAVIORAL HEALTH - NSBHCHARTREVIEWLAB_PSY_A_CORE FT
15.4   7.36  )-----------( 254      ( 15 Sep 2020 06:30 )             45.7
LABS:  cret                        15.4   8.43  )-----------( 225      ( 16 Sep 2020 06:19 )             46.4     09-16    138  |  101  |  19  ----------------------------<  121<H>  3.8   |  27  |  0.77    Ca    9.1      16 Sep 2020 06:19  Phos  3.4     09-16  Mg     2.0     09-16    TPro  6.4  /  Alb  4.0  /  TBili  0.2  /  DBili  x   /  AST  39  /  ALT  51<H>  /  AlkPhos  69  09-16
No new labs    14.1   6.53  )-----------( 237      ( 12 Sep 2020 07:00 )             41.7   09-12    139  |  104  |  11  ----------------------------<  101<H>  3.6   |  26  |  0.59    Ca    8.3<L>      12 Sep 2020 07:00  Phos  2.5     09-12  Mg     1.8     09-12    TPro  5.6<L>  /  Alb  3.5  /  TBili  0.5  /  DBili  x   /  AST  25  /  ALT  30  /  AlkPhos  68  09-12
14.1   6.53  )-----------( 237      ( 12 Sep 2020 07:00 )             41.7   09-12    139  |  104  |  11  ----------------------------<  101<H>  3.6   |  26  |  0.59    Ca    8.3<L>      12 Sep 2020 07:00  Phos  2.5     09-12  Mg     1.8     09-12    TPro  5.6<L>  /  Alb  3.5  /  TBili  0.5  /  DBili  x   /  AST  25  /  ALT  30  /  AlkPhos  68  09-12
No new labs    14.1   6.53  )-----------( 237      ( 12 Sep 2020 07:00 )             41.7   09-12    139  |  104  |  11  ----------------------------<  101<H>  3.6   |  26  |  0.59    Ca    8.3<L>      12 Sep 2020 07:00  Phos  2.5     09-12  Mg     1.8     09-12    TPro  5.6<L>  /  Alb  3.5  /  TBili  0.5  /  DBili  x   /  AST  25  /  ALT  30  /  AlkPhos  68  09-12

## 2020-09-16 NOTE — DISCHARGE NOTE PROVIDER - NSDCMRMEDTOKEN_GEN_ALL_CORE_FT
cyclobenzaprine 5 mg oral tablet: 1 tab(s) orally 3 times a day, As needed, Muscle Spasm  folic acid 1 mg oral tablet: 1 tab(s) orally once a day  gabapentin 300 mg oral capsule: 1 cap(s) orally 3 times a day  hydrOXYzine hydrochloride 50 mg oral tablet: 1 tab(s) orally every 6 hours, As Needed -anxiety - for anxiety   melatonin 3 mg oral tablet: 1 tab(s) orally once a day (at bedtime)  Multiple Vitamins oral tablet: 1 tab(s) orally once a day  naltrexone 50 mg oral tablet: 1 tab(s) orally once a day  QUEtiapine 100 mg oral tablet: 2 tab(s) orally once a day (at bedtime)   sertraline 100 mg oral tablet: 1 tab(s) orally once a day   folic acid 1 mg oral tablet: 1 tab(s) orally once a day  gabapentin 100 mg oral capsule: 2 cap(s) orally 3 times a day  melatonin 3 mg oral tablet: 1 tab(s) orally once a day (at bedtime)  Multiple Vitamins oral tablet: 1 tab(s) orally once a day  QUEtiapine 100 mg oral tablet: 1 tab(s) orally once a day (at bedtime)  sertraline 100 mg oral tablet: 1 tab(s) orally once a day   folic acid 1 mg oral tablet: 1 tab(s) orally once a day  gabapentin 100 mg oral capsule: 2 cap(s) orally 3 times a day  gabapentin 100 mg oral capsule: 2 cap(s) orally 3 times a day  melatonin 3 mg oral tablet: 1 tab(s) orally once a day (at bedtime)  Multiple Vitamins oral tablet: 1 tab(s) orally once a day  QUEtiapine 100 mg oral tablet: 1 tab(s) orally once a day (at bedtime)  sertraline 100 mg oral tablet: 1 tab(s) orally once a day

## 2020-09-16 NOTE — DISCHARGE NOTE NURSING/CASE MANAGEMENT/SOCIAL WORK - NSDCFUADDAPPT_GEN_ALL_CORE_FT
Continue your medications as directed and follow up with your primary care provider/psychiatrist for further evaluation/management. If you are ever in need of immediate psychiatric assistance you may reach out to the [facility] [phone].   [facility]  [phone] [place] [address] [phone]

## 2020-09-16 NOTE — DISCHARGE NOTE PROVIDER - HOSPITAL COURSE
34M w/hx of cocaine use and alcohol abuse who presents to the ED  for suicidal ideation and alcohol intoxication.    Alcohol withdrawal syndrome without complication. Ethanol level 73 on admission. Completed Librium taper. Continued MVI, folic acid, thiamine IV 500mg TID x 3 days, then transition to PO   Psych following, appreciated recs. Discussed with psych attending, discontinued 1:1 observation.     Suicidal ideation. Continued Sertraline 100mg QD. Continued decreased dose of Seroquel 100mg qhs, Gabapentin 100mg TID for back pain/spasm and behavior, can uptitrate slowly if needed (home dose 300mg TID) per psych, cont 200mg tid for now.    Bradycardia.   Pt. Persists to have bradycardia on tele, also with low bp, discussed with psych attending about psych meds. Cardiology consulted, pt is asymptomatic, HR goes up with movement. Continued with current medications.    Multiple substance abuse. Continue home meds as above. Utox positive for Benzo.     On ___ this case was reviewed with  ____, the patient is medically stable and optimized for discharge. All medications were reviewed and prescriptions were sent to mutually agreed upon pharmacy. 34M w/hx of cocaine use and alcohol abuse who presents to the ED  for suicidal ideation and alcohol intoxication.    Alcohol withdrawal syndrome without complication. Ethanol level 73 on admission. Completed Librium taper. Continued MVI, folic acid, thiamine IV 500mg TID x 3 days, then transition to PO   Psych following, appreciated recs. Discussed with psych attending, discontinued 1:1 observation. Pt refused screening from [facility].     Suicidal ideation. Continued Sertraline 100mg QD. Continued decreased dose of Seroquel 100mg qhs, Gabapentin 100mg TID for back pain/spasm and behavior, can uptitrate slowly if needed (home dose 300mg TID) per psych, cont 200mg tid for now. Pt. no longer states any SI or HI.     Bradycardia.   Pt. Persists to have bradycardia on tele, also with low bp, discussed with psych attending about psych meds. Cardiology consulted, pt is asymptomatic, HR goes up with movement. Continued with current medications.    Multiple substance abuse. Continue home meds as above. Utox positive for Benzo.     On 9/16/2020 this case was reviewed with Dr. Waterman, the patient is medically stable and optimized for discharge. Set up acceptance at [facility] with SW help. As per psych, no contraindications to discharge. All medications were reviewed and prescriptions were sent to mutually agreed upon pharmacy. 34M w/hx of cocaine use and alcohol abuse who presents to the ED  for suicidal ideation and alcohol intoxication.    Alcohol withdrawal syndrome without complication. Ethanol level 73 on admission. Completed Librium taper. Continued MVI, folic acid, thiamine IV 500mg TID x 3 days, then transition to PO   Psych following, appreciated recs. Discussed with psych attending, discontinued 1:1 observation. Pt refused screening from [facility].     Suicidal ideation. Continued Sertraline 100mg QD. Continued decreased dose of Seroquel 100mg qhs, Gabapentin 100mg TID for back pain/spasm and behavior, can uptitrate slowly if needed (home dose 300mg TID) per psych, cont 200mg tid for now. Pt. no longer states any SI or HI, states he was upset during admission but now adamantly denies SI or HI.     Bradycardia.   Pt. Persists to have bradycardia on tele, also with low bp, discussed with psych attending about psych meds. Cardiology consulted, pt is asymptomatic, HR goes up with movement. Continued with current medications.    Multiple substance abuse. Continue home meds as above. Utox positive for Benzo. Pt. noted with low free thyroxine and normal TSH.     On 9/16/2020 this case was reviewed with Dr. Waterman, the patient is medically stable and optimized for discharge. Set up acceptance at [facility] with SW help. As per psych, no contraindications to discharge. All medications were reviewed and prescriptions were sent to mutually agreed upon pharmacy. 34M w/hx of cocaine use and alcohol abuse who presents to the ED  for suicidal ideation and alcohol intoxication.    Alcohol withdrawal syndrome without complication. Ethanol level 73 on admission. Completed Librium taper. Continued MVI, folic acid, thiamine IV 500mg TID x 3 days, then transition to PO   Psych following, appreciated recs. Discussed with psych attending, discontinued 1:1 observation. Pt refused screening from [facility].     Suicidal ideation. Continued Sertraline 100mg QD. Continued decreased dose of Seroquel 100mg qhs, Gabapentin 100mg TID for back pain/spasm and behavior, can uptitrate slowly if needed (home dose 300mg TID) per psych, cont 200mg tid for now. Pt. no longer states any SI or HI, states he was upset during admission but now adamantly denies SI or HI.     Bradycardia.   Pt. Persists to have bradycardia on tele, also with low bp, discussed with psych attending about psych meds. Cardiology consulted, pt is asymptomatic, HR goes up with movement. Continued with current medications.    Multiple substance abuse. Continue home meds as above. Utox positive for Benzo. Pt. noted with low free thyroxine and normal TSH. Pt to f/u as outpt for rpt tfts in 2-3 weeks    On 9/16/2020 this case was reviewed with Dr. Waterman, the patient is medically stable and optimized for discharge. Set up acceptance at [facility] with SW help. As per psych, no contraindications to discharge. All medications were reviewed and prescriptions were sent to mutually agreed upon pharmacy. 34M w/hx of cocaine use and alcohol abuse who presents to the ED  for suicidal ideation and alcohol intoxication.    Alcohol withdrawal syndrome without complication. Ethanol level 73 on admission. Completed Librium taper. Continued MVI, folic acid, thiamine IV 500mg TID x 3 days, then transition to PO   Psych following, appreciated recs. Discussed with psych attending, discontinued 1:1 observation. Pt refused screening from [facility].     Suicidal ideation. Continued Sertraline 100mg QD. Continued decreased dose of Seroquel 100mg qhs, Gabapentin 100mg TID for back pain/spasm and behavior, can uptitrate slowly if needed (home dose 300mg TID) per psych, cont 200mg tid for now. Pt. no longer states any SI or HI, states he was upset during admission but now adamantly denies SI or HI.     Bradycardia.   Pt. Persists to have bradycardia on tele, also with low bp, discussed with psych attending about psych meds. Cardiology consulted, pt is asymptomatic, HR goes up with movement. Continued with current medications.    Multiple substance abuse. Continue home meds as above. Utox positive for Benzo. Pt. noted with low free thyroxine and normal TSH. Pt to f/u as outpt for rpt tfts in 2-3 weeks    On 9/16/2020 this case was reviewed with Dr. Waterman, the patient is medically stable and optimized for discharge. Set up acceptance at [facility] with SW help. As per psych, no contraindications to discharge. All medications were reviewed and prescriptions were sent to mutually agreed upon pharmacy. 34M w/hx of cocaine use and alcohol abuse who presents to the ED  for suicidal ideation and alcohol intoxication.    Alcohol withdrawal syndrome without complication. Ethanol level 73 on admission. Completed Librium taper. Continued MVI, folic acid, thiamine IV 500mg TID x 3 days, then transition to PO   Psych following, appreciated recs. Discussed with psych attending, discontinued 1:1 observation. Pt refused screening from [facility].     Suicidal ideation. Continued Sertraline 100mg QD. Continued decreased dose of Seroquel 100mg qhs, Gabapentin 100mg TID for back pain/spasm and behavior, can uptitrate slowly if needed (home dose 300mg TID) per psych, cont 200mg tid for now. Pt. no longer states any SI or HI, states he was upset during admission but now adamantly denies SI or HI.     Bradycardia.   Pt. Persists to have bradycardia on tele, also with low bp, discussed with psych attending about psych meds. Cardiology consulted, pt is asymptomatic, HR goes up with movement. Continued with current medications.    Multiple substance abuse. Continue home meds as above. Utox positive for Benzo. Pt. noted with low free thyroxine and normal TSH. Pt to f/u as outpt for rpt tfts in 2-3 weeks    On 9/16/2020 this case was reviewed with Dr. Waterman, the patient is medically stable and optimized for discharge. Set up acceptance at [facility] with SW help. As per psych, no contraindications to discharge. All medications were reviewed and prescriptions were sent to mutually agreed upon pharmacy.  Only one prescription for gabapentin was sent to pharmacy by [facility], plan states 2 prescription by mistake.

## 2020-09-16 NOTE — DISCHARGE NOTE PROVIDER - NSDCFUADDAPPT_GEN_ALL_CORE_FT
Continue your medications as directed and follow up with your primary care provider/psychiatrist for further evaluation/management. If you are ever in need of immediate psychiatric assistance you may reach out to the [facility] [phone].   [facility]  [address] [place] [address] [phone]

## 2020-09-16 NOTE — PROGRESS NOTE BEHAVIORAL HEALTH - CASE SUMMARY
Patient is a 33 y/o M, single, non-domiciled, unemployed, s/p court mandated rehab in 2012 for cocaine arrest, recent psych hospitalization at [facility] 9/1-9/4/2020 for ETOH and SI. Patient now presents to the ED a week later with self reported noncompliance with medication, 12-15beer/day use and withdrawal symptoms. Patient has hx of noncompliance and multiple inpt admissions with similar SI and depression complaints. Most recent CIWA score is 5 for headache, disorientation, and tremors.    Chart reviewed. Pt seen with resident as above. Agree with plan (taper, continue CO, lacks capacity to leave AMA, possible eventual inpatient psych transfer/dispo given SI). Will continue to follow over weekend. Please call if questions/concerns arise.
Patient is a 35 y/o M, single, non-domiciled, unemployed, s/p court mandated rehab in 2012 for cocaine arrest, recent psych hospitalization at [facility] 9/1-9/4/2020 for ETOH and SI. Patient now presents to the ED a week later with self reported noncompliance with medication, 12-15beer/day use and withdrawal symptoms. Patient has hx of noncompliance and multiple inpt admissions with similar SI and depression complaints. Most recent CIWA score is 5 for headache, disorientation, and tremors.    As above. Chart reviewed. Seen this AM with resident. Does not engage in interview (likes to sleep in AMs). Denied complaints or SI currently but was not engaging. CO present.     Agree w/ above recs and would go further and advise increasing GBP to his usual dose to prevent behavioral agitation. Will continue to follow as psych team for dispo considerations.
Patient seen and evaluated with Medical student Barry, I agree with above assessment and plan, pt. AAOX3, calm, cooperative, linear and coherent, seems to be in good spirits, engaging well, smiling appropriately, hopeful, treatment seeking and future oriented. Patient wants to follow up at [facility]. Patient denies feeling depressed, denies acute psychotic or manic sxs, adamantly denies passive or active SI/I/P, denies HI. Stated that he is looking forward to see his friends. Patient is amenable to go to shelter. As per CAMERON Martins:  Patient was accepted at the shelter, he was provided the information of shelter. Recommend to continue meds. as written above, oupt. referrals as above, case discussed with Dr. Waterman and YVETTE Loco and CAMERON Martins in person.

## 2020-09-16 NOTE — PROGRESS NOTE ADULT - PROBLEM SELECTOR PLAN 1
Ethanol level 73 on admission   -Completed Librium taper  -C/w MVI, folic acid  -C/w thiamine IV 500mg TID x 3 days, then transition to PO   -Psych following, appreciate recs  Discussed with psych attending, d/c 1:1 observation on 9/15  Discussed with pt along with psych attending and SW, pt does not want to wait for approval from shelter, wants to go his aunt's house  d/c today, dc/ planning time spent in coordination 45 mts ( discussion with psych, SW, ACP, preparing d/c summary and plan) Ethanol level 73 on admission   -Completed Librium taper  -C/w MVI, folic acid  -C/w thiamine IV 500mg TID x 3 days, then transition to PO   -Psych following, appreciate recs  Discussed with psych attending, d/c 1:1 observation on 9/15  Discussed with pt along with psych attending and SW, pt does not want to wait for approval from shelter, wants to go his aunt's house  d/c today, dc/ planning time spent in coordination 45 mts ( discussion with psych, SW, ACP, preparing d/c summary and plan)  Addendum: Informed by SW that he was accepted to shelter and is going to shelter

## 2020-09-16 NOTE — PROGRESS NOTE ADULT - SUBJECTIVE AND OBJECTIVE BOX
Cezar Gupta MD  Interventional Cardiology / Endovascular Specialist  [place] Office : [phone] [place], [place] [address]  Tel:   [place] Office : [address] [facility] [place] [address]  Tel: [phone]  Cell : [phone]    HISTORY OF PRESENTING ILLNESS:      33 y/o male with hx of cocaine use and alcohol abuse who presents to the ED  for suicidal ideation and alcohol intoxication.   	  MEDICATIONS:        acetaminophen   Tablet .. 650 milliGRAM(s) Oral every 6 hours PRN  gabapentin 200 milliGRAM(s) Oral three times a day  melatonin 3 milliGRAM(s) Oral at bedtime PRN  QUEtiapine 100 milliGRAM(s) Oral at bedtime  sertraline 100 milliGRAM(s) Oral daily        folic acid 1 milliGRAM(s) Oral daily  multivitamin 1 Tablet(s) Oral daily  sodium chloride 0.9% 1000 milliLiter(s) IV Continuous <Continuous>      PAST MEDICAL/SURGICAL HISTORY  PAST MEDICAL & SURGICAL HISTORY:  Alcohol abuse    No significant past surgical history        SOCIAL HISTORY: Substance Use (street drugs): ( x ) never used  (  ) other:    FAMILY HISTORY:  No pertinent family history in first degree relatives        REVIEW OF SYSTEMS:  CONSTITUTIONAL: No fever, weight loss, or fatigue  EYES: No eye pain, visual disturbances, or discharge  ENMT:  No difficulty hearing, tinnitus, vertigo; No sinus or throat pain  BREASTS: No pain, masses, or nipple discharge  GASTROINTESTINAL: No abdominal or epigastric pain. No nausea, vomiting, or hematemesis; No diarrhea or constipation. No melena or hematochezia.  GENITOURINARY: No dysuria, frequency, hematuria, or incontinence  NEUROLOGICAL: No headaches, memory loss, loss of strength, numbness, or tremors  ENDOCRINE: No heat or cold intolerance; No hair loss  MUSCULOSKELETAL: No joint pain or swelling; No muscle, back, or extremity pain  PSYCHIATRIC: No depression, anxiety, mood swings, or difficulty sleeping  HEME/LYMPH: No easy bruising, or bleeding gums  All others negative    PHYSICAL EXAM:  T(C): 36.3 (09-16-20 @ 10:15), Max: 36.7 (09-16-20 @ 05:05)  HR: 76 (09-16-20 @ 10:15) (61 - 76)  BP: 114/80 (09-16-20 @ 10:15) (93/54 - 119/79)  RR: 17 (09-16-20 @ 10:15) (16 - 18)  SpO2: 100% (09-16-20 @ 10:15) (100% - 100%)  Wt(kg): --  I&O's Summary      GENERAL: NAD, lethatgic  EYES: EOMI, PERRLA, conjunctiva and sclera clear  ENMT: No tonsillar erythema, exudates, or enlargement; Moist mucous membranes, Good dentition, No lesions  Cardiovascular: Normal S1 S2, No JVD, No murmurs, No edema  Respiratory: Lungs clear to auscultation	  Gastrointestinal:  Soft, Non-tender, + BS	  Extremities: Normal range of motion, No clubbing, cyanosis or edema                                  15.4   8.43  )-----------( 225      ( 16 Sep 2020 06:19 )             46.4     09-16    138  |  101  |  19  ----------------------------<  121<H>  3.8   |  27  |  0.77    Ca    9.1      16 Sep 2020 06:19  Phos  3.4     09-16  Mg     2.0     09-16    TPro  6.4  /  Alb  4.0  /  TBili  0.2  /  DBili  x   /  AST  39  /  ALT  51<H>  /  AlkPhos  69  09-16    proBNP:   Lipid Profile:   HgA1c:   TSH: Thyroid Stimulating Hormone, Serum: 3.61 uIU/mL (09-16 @ 06:19)      Consultant(s) Notes Reviewed:  [x ] YES  [ ] NO    Care Discussed with Consultants/Other Providers [ x] YES  [ ] NO    Imaging Personally Reviewed independently:  [x] YES  [ ] NO    All labs, radiologic studies, vitals, orders and medications list reviewed. Patient is seen and examined at bedside. Case discussed with medical team.

## 2020-09-16 NOTE — DISCHARGE NOTE NURSING/CASE MANAGEMENT/SOCIAL WORK - PATIENT PORTAL LINK FT
You can access the FollowMyHealth Patient Portal offered by [facility] by registering at the following website: [email or web address]. By joining [facility]’s FollowMyHealth portal, you will also be able to view your health information using other applications (apps) compatible with our system.

## 2020-09-16 NOTE — PROGRESS NOTE BEHAVIORAL HEALTH - RISK ASSESSMENT
Pt is at increased risk for suicide due to his current suicidal ideation and intent. Other risk factors include his male gender, recent discharge from a psychiatric hospital, recent aborted suicide attempt, multiple prior psychiatric admissions, history of substance use disorder, undomiciled status, unemployed status.   Protective factors include no current plan for suicide, future oriented, no active psychotic or manic symptoms, and no known history of NSSIB.    - In my opinion, not at acute risk of harm to self or others. Patient does not meet criteria for involuntary admission to [facility] Risk factors include his male gender, recent discharge from a psychiatric hospital, recent aborted suicide attempt, multiple prior psychiatric admissions, history of substance use disorder, undomiciled status, unemployed status. reported SI in the admission in the context of being homeless  Protective factors include no current SI/I/P, future oriented, no active psychotic or manic symptoms, and no known history of NSSIB. Denies HI, willing to go to shelter, willing to follow up at [facility] clinic, mood is improving    - In my opinion, not at acute risk of harm to self or others. Patient does not meet criteria for involuntary admission to [facility]

## 2020-09-16 NOTE — PROGRESS NOTE ADULT - PROBLEM SELECTOR PLAN 3
Persists to have bradycardia on tele, also with low bp, discussed with psych attending about psych meds  Appreciate cards consult, Heart rate improves upon ambulation, with no symptoms, f/u as outpt  plan discussed with ACP Persists to have bradycardia on tele, also with low bp, discussed with psych attending about psych meds  Appreciate cards consult, Heart rate improves upon ambulation, with no symptoms, f/u as outpt  TSH normal, slightly low free t4, pt will need to f/u as outpt for rpt tfts in 2-3 weeks  plan discussed with ACP

## 2020-09-16 NOTE — PROGRESS NOTE BEHAVIORAL HEALTH - NSBHCONSULTFOLLOWDETAILS_PSY_A_CORE FT
Pt. cannot leave AMA
Pt. cannot leave AMA  -Will continue to assess for need for psychiatric hospitalization once medically cleared.
-Will continue to follow
Patient is not actively manic, psychotic, or in acute etoh withdrawal. Patient at low risk of harm to self or others, and does not meet criteria for involuntary admission to [facility]
Pt. cannot leave AMA

## 2020-09-16 NOTE — DISCHARGE NOTE PROVIDER - NSDCCPCAREPLAN_GEN_ALL_CORE_FT
PRINCIPAL DISCHARGE DIAGNOSIS  Diagnosis: Alcohol withdrawal  Assessment and Plan of Treatment: Please call  to schedule an appointment with your psychiatrist  [address] [place] [phone]  Continue daily thiamine, folic acid, multivitamin. Please purchase over the counter.  In order to optimize your overall health and prevent adverse events, please abstain from ingesting alcohol. Continue to supplement with recommended vitamins and follow-up with your primary care provider for further medical care. It is highly recommended to attend AA meetings to help create a sober lifestyle. If you need immediate assistance with substance abuse you may contact the [facility] by calling [phone].      SECONDARY DISCHARGE DIAGNOSES  Diagnosis: Abnormal thyroid function test  Assessment and Plan of Treatment:     Diagnosis: Transaminitis  Assessment and Plan of Treatment: Your liver enzymes were mildly elevated due to alcohol withdrawal. Please refrain from alchohol and follow up with your primary care or   our Medicine Clinic at  [address] [place] [address] [phone] or [phone]  (please call to make appointment)   to follow up with a primary care provider.    Diagnosis: Depression  Assessment and Plan of Treatment: Continue your medications as directed and follow up with your primary care provider/psychiatrist for further evaluation/management. If you are ever in need of immediate psychiatric assistance you may reach out to the [facility] [phone].   [facility]  [address] [place] [address] [phone]    Diagnosis: Suicidal ideations  Assessment and Plan of Treatment: Continue your medications as directed and follow up with your primary care provider/psychiatrist for further evaluation/management. If you are ever in need of immediate psychiatric assistance you may reach out to the [facility] [phone].   [facility]  [address] [place] [address] [phone]     PRINCIPAL DISCHARGE DIAGNOSIS  Diagnosis: Alcohol withdrawal  Assessment and Plan of Treatment: Please call  to schedule an appointment with your psychiatrist  [address] [place] [phone]  Continue daily thiamine, folic acid, multivitamin. Please purchase over the counter.  In order to optimize your overall health and prevent adverse events, please abstain from ingesting alcohol. Continue to supplement with recommended vitamins and follow-up with your primary care provider for further medical care. It is highly recommended to attend AA meetings to help create a sober lifestyle. If you need immediate assistance with substance abuse you may contact the [facility] by calling [phone].      SECONDARY DISCHARGE DIAGNOSES  Diagnosis: Bradycardia  Assessment and Plan of Treatment: You were found to have a low heartrate that resolved. You were seen by cardiology and your medications were adjusted. Please follow up with cardiology in 1 to 2 weeks for further care. Return to ER if any chest pain, shortness of breath, dizziness, nausea, vomiting develop.    Diagnosis: Abnormal thyroid function test  Assessment and Plan of Treatment: Please follow up with your primary care provider in 1 to 2 weeks to recheck your thyroid function. Your levels were low. Return to ER if any dizziness, headache, palpitations, chest pain occur.    Diagnosis: Transaminitis  Assessment and Plan of Treatment: Your liver enzymes were mildly elevated due to alcohol withdrawal. Please refrain from alchohol and follow up with your primary care or   our Medicine Clinic at  [address] [place] [address] [phone] or [phone]  (please call to make appointment)   to follow up with a primary care provider.    Diagnosis: Depression  Assessment and Plan of Treatment: Continue your medications as directed and follow up with your primary care provider/psychiatrist for further evaluation/management. If you are ever in need of immediate psychiatric assistance you may reach out to the [facility] [phone].   [facility]  [address] [address] [phone]    Diagnosis: Suicidal ideations  Assessment and Plan of Treatment: Continue your medications as directed and follow up with your primary care provider/psychiatrist for further evaluation/management. If you are ever in need of immediate psychiatric assistance you may reach out to the [facility] [phone].   [facility]  [address] [place] [address] [phone]

## 2020-09-16 NOTE — PROGRESS NOTE BEHAVIORAL HEALTH - NSBHCONSFOLLOWNEEDS_PSY_A_CORE
Patient needs further psychiatric safety assessment prior to discharge
no psychiatric contraindications to discharge
Patient needs further psychiatric safety assessment prior to discharge

## 2020-09-16 NOTE — PROGRESS NOTE ADULT - PROBLEM SELECTOR PLAN 4
-continue home meds as above  -utox positive for Benzo
-continue home meds as above  -utox positive for Benzo
Lovenox   Regular diet
-continue home meds as above  -utox positive for Benzo
Lovenox   Regular diet

## 2020-09-16 NOTE — PROGRESS NOTE ADULT - PROBLEM SELECTOR PLAN 5
Lovenox   Regular diet
Lovenox   Regular diet    - TSH normal, slightly low free t4, pt will need to f/u as outpt for rpt tfts in 2-3 weeks
Lovenox   Regular diet

## 2020-09-16 NOTE — PROGRESS NOTE BEHAVIORAL HEALTH - PRIMARY DX
Alcohol withdrawal
Substance induced mood disorder
Alcohol withdrawal
Substance induced mood disorder
Alcohol dependence

## 2020-09-16 NOTE — PROGRESS NOTE BEHAVIORAL HEALTH - NSBHCHARTREVIEWVS_PSY_A_CORE FT
ICU Vital Signs Last 24 Hrs  T(C): 36.2 (15 Sep 2020 11:39), Max: 36.9 (14 Sep 2020 16:00)  T(F): 97.2 (15 Sep 2020 11:39), Max: 98.4 (14 Sep 2020 16:00)  HR: 62 (15 Sep 2020 11:39) (62 - 77)  BP: 93/54 (15 Sep 2020 11:39) (93/54 - 132/71)  BP(mean): --  ABP: --  ABP(mean): --  RR: 18 (15 Sep 2020 11:39) (16 - 18)  SpO2: 100% (15 Sep 2020 11:39) (97% - 100%)
Vital Signs (24 Hrs):  T(C): 36.5 (09-16-20 @ 12:03), Max: 36.7 (09-16-20 @ 05:05)  HR: 86 (09-16-20 @ 12:03) (61 - 86)  BP: 110/78 (09-16-20 @ 12:03) (110/78 - 119/79)  RR: 18 (09-16-20 @ 12:03) (16 - 18)  SpO2: 98% (09-16-20 @ 12:03) (98% - 100%)  Wt(kg): --  Daily     Daily     I&O's Summary
Vital Signs Last 24 Hrs  T(C): 36.3 (14 Sep 2020 12:00), Max: 36.6 (14 Sep 2020 00:00)  T(F): 97.4 (14 Sep 2020 12:00), Max: 97.8 (14 Sep 2020 00:00)  HR: 69 (14 Sep 2020 12:00) (55 - 71)  BP: 113/67 (14 Sep 2020 12:00) (107/72 - 124/76)  BP(mean): --  RR: 16 (14 Sep 2020 12:00) (16 - 19)  SpO2: 98% (14 Sep 2020 12:00) (97% - 100%)
ICU Vital Signs Last 24 Hrs  T(C): 36.5 (12 Sep 2020 08:30), Max: 37.7 (12 Sep 2020 01:30)  T(F): 97.7 (12 Sep 2020 08:30), Max: 99.8 (12 Sep 2020 01:30)  HR: 59 (12 Sep 2020 08:30) (59 - 92)  BP: 108/78 (12 Sep 2020 08:30) (108/78 - 136/79)  BP(mean): --  ABP: --  ABP(mean): --  RR: 18 (12 Sep 2020 08:30) (15 - 18)  SpO2: 97% (12 Sep 2020 08:30) (95% - 100%)
ICU Vital Signs Last 24 Hrs  T(C): 36.6 (13 Sep 2020 08:00), Max: 36.7 (12 Sep 2020 14:30)  T(F): 97.8 (13 Sep 2020 08:00), Max: 98.1 (13 Sep 2020 06:20)  HR: 64 (13 Sep 2020 08:00) (58 - 66)  BP: 107/78 (13 Sep 2020 08:00) (99/59 - 131/89)  BP(mean): --  ABP: --  ABP(mean): --  RR: 17 (13 Sep 2020 08:00) (16 - 17)  SpO2: 98% (13 Sep 2020 08:00) (97% - 100%)

## 2020-09-17 RX ORDER — GABAPENTIN 400 MG/1
2 CAPSULE ORAL
Qty: 0 | Refills: 0 | DISCHARGE
Start: 2020-09-17

## 2020-09-17 NOTE — CHART NOTE - NSCHARTNOTEFT_GEN_A_CORE
Pt. discharge medication section in discharge note lists "gabapentin 100 mg oral capsule: 2 cap(s) orally 3 times a day" twice. Spoke with pharmacist at pt's [facility] pharmacy at [address] [place] [address] at  who states there is only one prescription present for gabapentin 100 mg oral capsule: 2 cap(s) orally 3 times a day.

## 2021-02-19 NOTE — H&P ADULT - BACK
Pts vein mapping is scheduled @ [facility] on 3/8/21. Check in 1:45pm, US 2pm.    Pts has a follow up to go over results @ 4:15pm.    I left voicemail with appointment information.   No deformity or limitation of movement detailed exam

## 2021-04-18 ENCOUNTER — EMERGENCY (EMERGENCY)
Facility: HOSPITAL | Age: 36
LOS: 1 days | Discharge: HOME | End: 2021-04-18
Attending: EMERGENCY MEDICINE
Payer: SELF-PAY

## 2021-04-18 VITALS
OXYGEN SATURATION: 95 % | HEART RATE: 103 BPM | TEMPERATURE: 98 F | DIASTOLIC BLOOD PRESSURE: 94 MMHG | SYSTOLIC BLOOD PRESSURE: 149 MMHG | RESPIRATION RATE: 18 BRPM

## 2021-04-18 PROCEDURE — 99282 EMERGENCY DEPT VISIT SF MDM: CPT

## 2021-04-18 PROCEDURE — 82962 GLUCOSE BLOOD TEST: CPT

## 2021-04-18 PROCEDURE — 99284 EMERGENCY DEPT VISIT MOD MDM: CPT

## 2021-04-18 NOTE — ED PROVIDER NOTE - CONSTITUTIONAL, MLM
Health Maintenance Due   Topic Date Due   • DTaP/Tdap/Td Vaccine (4 - DTaP) 12/16/2017   • Annual Physical (ages 3-18)  09/16/2019   • Hepatitis A Vaccine (2 of 2 - 2-dose series) 09/18/2019       Patient is due for topics as listed above but is not proceeding with immunizations or wce at this time. Patient to follow up with primary.           Patient sleepy and arousable. normal...

## 2021-04-18 NOTE — ED ADULT NURSE NOTE - NSFALLRSKHARMRISK_ED_ALL_ED
Medication Refill Request    Date of phone call: 1/30/18    Medication being requested: Hydrocodone-apap 7.5 sig: q8hrs  Qty: 90    Date of last visit: 1/9/18    Date of last refill: 1/9/18    BENSON up to date?: 1/8/18    Next Follow up?: 3/7/18    Any new pertinent information? (i.e, new medication allergies, new use of medications, change in patient's health or condition, non-compliance or inconsistency with prescribing agreement?): n/a     no

## 2021-04-18 NOTE — ED PROVIDER NOTE - CLINICAL SUMMARY MEDICAL DECISION MAKING FREE TEXT BOX
35 year old male with chief complaint of chronic arm weakness. No exhibited weakness on exam. Will assess for neurological improvement in the ED.

## 2021-04-18 NOTE — ED ADULT NURSE NOTE - OBJECTIVE STATEMENT
Pt. c/o right arm weakness ongoing for a month. Pt. admits to drinking alcohol and cocaine. Pt. is currently sleeping and responds to painful stimulation.

## 2021-04-18 NOTE — ED ADULT NURSE NOTE - NSIMPLEMENTINTERV_GEN_ALL_ED
Implemented All Fall Risk Interventions:  [place] to call system. Call bell, personal items and telephone within reach. Instruct patient to call for assistance. Room bathroom lighting operational. Non-slip footwear when patient is off stretcher. Physically safe environment: no spills, clutter or unnecessary equipment. Stretcher in lowest position, wheels locked, appropriate side rails in place. Provide visual cue, wrist band, yellow gown, etc. Monitor gait and stability. Monitor for mental status changes and reorient to person, place, and time. Review medications for side effects contributing to fall risk. Reinforce activity limits and safety measures with patient and family.

## 2021-04-18 NOTE — ED PROVIDER NOTE - PATIENT PORTAL LINK FT
You can access the FollowMyHealth Patient Portal offered by [facility] by registering at the following website: [email or web address]. By joining [facility]’s FollowMyHealth portal, you will also be able to view your health information using other applications (apps) compatible with our system.

## 2021-04-18 NOTE — ED PROVIDER NOTE - OBJECTIVE STATEMENT
35 year old male with no significant PMHx or PSHx presents to the ED with complaints of right arm weakness x1 month. Patient reports drinking alcohol prior to arrival as well as snorting cocaine two hours prior to arrival. Patient denies any chest pain, shortness of breath, headaches, trauma, and all other acute complaints. NKDA.

## 2021-04-18 NOTE — ED PROVIDER NOTE - PROGRESS NOTE DETAILS
Pt awake alert ambulatory w/o difficulty. No weakness noted again on exam. Pt ate meal. Became belligerent with staff.

## 2021-12-02 NOTE — ED PROVIDER NOTE - CHIEF COMPLAINT
Is This A New Presentation, Or A Follow-Up?: Skin Lesions The patient is a 34y Male complaining of How Severe Is Your Skin Lesion?: mild Have Your Skin Lesions Been Treated?: not been treated

## 2022-02-17 NOTE — ED PROVIDER NOTE - SCRIBE NAME
Detail Level: Detailed Quality 226: Preventive Care And Screening: Tobacco Use: Screening And Cessation Intervention: Patient screened for tobacco use and is an ex/non-smoker Quality 130: Documentation Of Current Medications In The Medical Record: Current Medications Documented Jannie Marroquin (Scribe)

## 2022-03-03 NOTE — ED ADULT NURSE NOTE - NS_BH TRG Q4B_ED_A_ED
Diabetes mellitus type 2 without retinopathy ([address]  Diabetes type II: no background of retinopathy, no signs of neovascularization noted. Discussed ocular and systemic benefits of blood sugar control. Diagnosis and treatment discussed in detail with patient. Pseudophakia of both eyes  No treatment. Cataract surgery looks great in both eyes. Dermatochalasis of both upper eyelids  Discussed with patient that he could have eyelid surgery if he is interested. Would recommend either [facility] or Dr. Nakul [place] at [address] [place] [address] Ophthalmology. Floater, vitreous, bilateral  No treatment. Past 24 hrs

## 2022-07-18 NOTE — ED ADULT NURSE NOTE - CHIEF COMPLAINT
Pain management order placed for consultation.   Follow up with Dr. Pierce in about 6 months.  If symptoms worsen or fail to improve please call our office at [phone].   
The patient is a 34y Male complaining of

## 2022-08-15 NOTE — ED ADULT NURSE NOTE - NSHOSCREENINGQ1_ED_ALL_ED
Problem: Potential for Falls  Goal: Patient will remain free of falls  Description: INTERVENTIONS:  - Educate patient/family on patient safety including physical limitations  - Instruct patient to call for assistance with activity   - Consult OT/PT to assist with strengthening/mobility   - Keep Call bell within reach  - Keep bed low and locked with side rails adjusted as appropriate  - Keep care items and personal belongings within reach  - Initiate and maintain comfort rounds  - Make Fall Risk Sign visible to staff  - Offer Toileting every 2 Hours, in advance of need  - Initiate/Maintain bed alarm  - Obtain necessary fall risk management equipment:   Problem: PAIN - ADULT  Goal: Verbalizes/displays adequate comfort level or baseline comfort level  Description: Interventions:  - Encourage patient to monitor pain and request assistance  - Assess pain using appropriate pain scale  - Administer analgesics based on type and severity of pain and evaluate response  - Implement non-pharmacological measures as appropriate and evaluate response  - Consider cultural and social influences on pain and pain management  - Notify physician/advanced practitioner if interventions unsuccessful or patient reports new pain  Outcome: Progressing     Problem: INFECTION - ADULT  Goal: Absence or prevention of progression during hospitalization  Description: INTERVENTIONS:  - Assess and monitor for signs and symptoms of infection  - Monitor lab/diagnostic results  - Monitor all insertion sites, i e  indwelling lines, tubes, and drains  - Monitor endotracheal if appropriate and nasal secretions for changes in amount and color  - [place] appropriate cooling/warming therapies per order  - Administer medications as ordered  - Instruct and encourage patient and family to use good hand hygiene technique  - Identify and instruct in appropriate isolation precautions for identified infection/condition  Outcome: Progressing  Goal: Absence of fever/infection during neutropenic period  Description: INTERVENTIONS:  - Monitor WBC    Outcome: Progressing     - Apply yellow socks and bracelet for high fall risk patients  - Consider moving patient to room near nurses station  Outcome: Progressing No

## 2023-06-02 NOTE — BEHAVIORAL HEALTH ASSESSMENT NOTE - THOUGHT PROCESS
Normal vision: sees adequately in most situations; can see medication labels, newsprint Normal reasoning

## 2023-07-12 NOTE — ED ADULT NURSE REASSESSMENT NOTE - CARDIO WDL
Telephone Call regarding Forms    If the patient has had recent visit with the provider AND discussed the forms during this visit then NO appointment is necessary. Please advise patient that forms require an appointment to be scheduled so that patient and provider can review the necessary documentation together. Type of Form:  Work / School excuse  Reason for the form/medical condition:  medication problem with augmentin     MUST BE COMPLETED FOR WORK EXCUSE, RADHA OR FMLA  First Day out of work -  07/12/2023   Anticipated Return to Work Date -  07/14/2023     Forms to be sent to:  [email or web address]  Contact Name: Patient     Date needed: 07/12/2023    Appointment scheduled:  Yes on Friday with virtualist     No future appointments. Please consult list of providers who do NOT provide forms for SERVICE ANIMALS. Normal rate, regular rhythm, normal S1, S2 heart sounds heard.

## 2024-02-29 NOTE — PROGRESS NOTE ADULT - PROBLEM SELECTOR PLAN 1
Phoned patient to schedule had to leave voice message.    -standing CIWA and symptom triggered CIWA  -s/p 4 day librium taper  -psych: likely [facility] admission once medically cleared  -SBIRT  -folate, thiamine, and MV  -VS q4  -SW for rehab? : Yes -standing CIWA and symptom triggered CIWA  -s/p 4 day librium taper  -psych to re-evaluate 7/21  -SBIRT  -folate, thiamine, and MV  -VS q4  -SW for rehab?

## 2024-08-20 NOTE — DIETITIAN INITIAL EVALUATION ADULT. - PROBLEM SELECTOR PROBLEM 5
Need for prophylactic measure Marsha Deshpande  [facility]  Cheri Atkinson  Scheduled Appointment: 10/02/2024

## 2025-02-18 NOTE — PROGRESS NOTE ADULT - PROBLEM/PLAN-4
Observed that the patient's blood pressure is slightly elevated today.  She is in pain from her shoulder and also has a headache.     Monitor blood pressure daily and log. Report consistent numbers greater than 140/90.    
DISPLAY PLAN FREE TEXT

## 2025-06-20 NOTE — PROGRESS NOTE BEHAVIORAL HEALTH - SUMMARY
[FreeTextEntry1] :  I, Isael Mcpherson, acted solely as a scribe for Dr. Blanca on this date on 06/18/2025.  Patient is a 35 y/o M, single, non-domiciled, unemployed, s/p court mandated rehab in 2012 for cocaine arrest, recent psych hospitalization at [facility] 9/1-9/4/2020 for ETOH and SI. Patient now presents to the ED a week later with self reported noncompliance with medication, 12-15beer/day use and withdrawal symptoms. Patient has hx of noncompliance and multiple inpt admissions with similar SI and depression complaints. Psychiatry consulted to alcohol withdrawal and SI.     9/14: No PRNs required overnight. HR: 62-71, CIWA :0-2, completed Librium Taper. Patient AAOX3, reported feeling sad and depressed as he has no place to live, stated that he needs a place to live, reported SI , " I don't want to live like this, I just want to die", denies intent or plan to act on these thoughts while he is the hospital. Denies AH and VH, no delusions elicited. Denies HI.       Recommendations:   - librium taper completed  - Continue Ativan PRN as per CIWA protocol  - Continue Seroquel 100mg po qHS  (HOLD IF PATIENT W SEVERE LETHARGY)  - c/w Sertraline 100mg QD  - Increase Gabapentin 200 mg po TID, titrate slowly and monitor for oversedation.   - continue with CO for SI  - thiamine IV 500mg TID x 3 days  -Will continue to assess for need for psychiatric hospitalization once medically cleared.  -SW assistance needed for placement  -Case discussed with Dr. Waterman  and YVETTE Monte in person Patient is a 33 y/o M, single, non-domiciled, unemployed, s/p court mandated rehab in 2012 for cocaine arrest, recent psych hospitalization at [facility] 9/1-9/4/2020 for ETOH and SI. Patient now presents to the ED a week later with self reported noncompliance with medication, 12-15beer/day use and withdrawal symptoms. Patient has hx of noncompliance and multiple inpt admissions with similar SI and depression complaints. Psychiatry consulted to alcohol withdrawal and SI.     9/14: No PRNs required overnight. HR: 62-71, CIWA :0-2, completed Librium Taper. Patient AAOX3, reported feeling sad and depressed as he has no place to live, stated that he needs a place to live, reported SI , " I don't want to live like this, I just want to die", denies intent or plan to act on these thoughts while he is the hospital. Denies AH and VH, no delusions elicited. Denies HI.       Recommendations:   - librium taper completed  - Continue Seroquel 100mg po qHS  (HOLD IF PATIENT W SEVERE LETHARGY)  - c/w Sertraline 100mg QD  - Increase Gabapentin 200 mg po TID, titrate slowly and monitor for oversedation.   - continue with CO for SI  - thiamine IV 500mg TID x 3 days  -Will continue to assess for need for psychiatric hospitalization once medically cleared.  -SW assistance needed for placement  -Case discussed with Dr. Waterman  and YVETTE Monte in person